# Patient Record
Sex: FEMALE | Race: OTHER | Employment: PART TIME | URBAN - METROPOLITAN AREA
[De-identification: names, ages, dates, MRNs, and addresses within clinical notes are randomized per-mention and may not be internally consistent; named-entity substitution may affect disease eponyms.]

---

## 2017-02-28 ENCOUNTER — HOSPITAL ENCOUNTER (OUTPATIENT)
Dept: ULTRASOUND IMAGING | Age: 47
Discharge: HOME | End: 2017-02-28
Attending: NURSE PRACTITIONER
Payer: COMMERCIAL

## 2017-02-28 DIAGNOSIS — R10.13 EPIGASTRIC PAIN: ICD-10-CM

## 2017-02-28 PROCEDURE — 76700 US EXAM ABDOM COMPLETE: CPT

## 2017-03-06 ENCOUNTER — HOSPITAL ENCOUNTER (OUTPATIENT)
Dept: MAMMOGRAPHY | Age: 47
Discharge: HOME | End: 2017-03-06
Attending: NURSE PRACTITIONER
Payer: COMMERCIAL

## 2017-03-06 DIAGNOSIS — Z12.31 VISIT FOR SCREENING MAMMOGRAM: ICD-10-CM

## 2017-03-06 PROCEDURE — 77067 SCR MAMMO BI INCL CAD: CPT

## 2017-03-20 ENCOUNTER — OFFICE VISIT (OUTPATIENT)
Dept: OBGYN CLINIC | Facility: CLINIC | Age: 47
End: 2017-03-20

## 2017-03-20 VITALS
SYSTOLIC BLOOD PRESSURE: 120 MMHG | BODY MASS INDEX: 25.52 KG/M2 | WEIGHT: 144 LBS | HEART RATE: 88 BPM | HEIGHT: 63 IN | RESPIRATION RATE: 16 BRPM | DIASTOLIC BLOOD PRESSURE: 70 MMHG

## 2017-03-20 DIAGNOSIS — Z12.4 CERVICAL CANCER SCREENING: ICD-10-CM

## 2017-03-20 DIAGNOSIS — Z01.419 ENCOUNTER FOR GYNECOLOGICAL EXAMINATION WITHOUT ABNORMAL FINDING: Primary | ICD-10-CM

## 2017-03-20 DIAGNOSIS — Z12.39 BREAST CANCER SCREENING: ICD-10-CM

## 2017-03-20 DIAGNOSIS — D25.9 UTERINE LEIOMYOMA, UNSPECIFIED LOCATION: ICD-10-CM

## 2017-03-20 PROCEDURE — 88175 CYTOPATH C/V AUTO FLUID REDO: CPT | Performed by: OBSTETRICS & GYNECOLOGY

## 2017-03-20 PROCEDURE — 87624 HPV HI-RISK TYP POOLED RSLT: CPT | Performed by: OBSTETRICS & GYNECOLOGY

## 2017-03-20 PROCEDURE — 99396 PREV VISIT EST AGE 40-64: CPT | Performed by: OBSTETRICS & GYNECOLOGY

## 2017-03-20 NOTE — PROGRESS NOTES
GYN H&P     3/20/2017  10:36 AM    CC: Patient presents with:  Physical      HPI: patient is a 48year old  here for her annual gyne exam.   She has no complaints. Menses are regular. Denies any pelvic pain or irregular vaginal discharge.    Cont Seat Belt Yes     Social History Narrative       ROS:     Review of Systems:   Constitutional: Negative for fever, chills and fatigue   HENT: Negative congestion, sore throat   Eyes: Negative for pain and visual disturbance.    Respiratory: Negative for exam.       1. Routine gyne exam  -discussed diet and exercise  -birth control:  vasectomy  2. Cervical cancer screening  -pap smear collected  -pap 11/2015 ASCUS HPV neg  -Pap Smear,3 Years due on 11/17/2018  3.  Breast cancer screening  -normal [place]

## 2017-03-21 LAB — HPV I/H RISK 1 DNA SPEC QL NAA+PROBE: NEGATIVE

## 2017-03-22 ENCOUNTER — HOSPITAL ENCOUNTER (OUTPATIENT)
Dept: ULTRASOUND IMAGING | Age: 47
Discharge: HOME | End: 2017-03-22
Attending: OBSTETRICS & GYNECOLOGY
Payer: COMMERCIAL

## 2017-03-22 DIAGNOSIS — D25.9 UTERINE LEIOMYOMA, UNSPECIFIED LOCATION: ICD-10-CM

## 2017-03-22 PROCEDURE — 76830 TRANSVAGINAL US NON-OB: CPT

## 2017-03-22 PROCEDURE — 76856 US EXAM PELVIC COMPLETE: CPT

## 2017-05-15 ENCOUNTER — OFFICE VISIT (OUTPATIENT)
Dept: OBGYN CLINIC | Facility: CLINIC | Age: 47
End: 2017-05-15

## 2017-05-15 VITALS — BODY MASS INDEX: 26 KG/M2 | SYSTOLIC BLOOD PRESSURE: 120 MMHG | WEIGHT: 146 LBS | DIASTOLIC BLOOD PRESSURE: 70 MMHG

## 2017-05-15 DIAGNOSIS — R87.610 ATYPICAL SQUAMOUS CELLS OF UNDETERMINED SIGNIFICANCE (ASCUS) ON PAPANICOLAOU SMEAR OF CERVIX: Primary | ICD-10-CM

## 2017-05-15 DIAGNOSIS — Z01.812 PRE-PROCEDURE LAB EXAM: ICD-10-CM

## 2017-05-15 PROCEDURE — 81025 URINE PREGNANCY TEST: CPT | Performed by: OBSTETRICS & GYNECOLOGY

## 2017-05-15 PROCEDURE — 88305 TISSUE EXAM BY PATHOLOGIST: CPT | Performed by: OBSTETRICS & GYNECOLOGY

## 2017-05-15 PROCEDURE — 57454 BX/CURETT OF CERVIX W/SCOPE: CPT | Performed by: OBSTETRICS & GYNECOLOGY

## 2017-05-15 PROCEDURE — 88342 IMHCHEM/IMCYTCHM 1ST ANTB: CPT | Performed by: OBSTETRICS & GYNECOLOGY

## 2017-05-15 NOTE — PROGRESS NOTES
GYN H&P     5/15/2017  11:06 AM    CC: Patient presents with:  Colposcopy      HPI: patient is a 48year old  here for Patient presents with:  Colposcopy      Pt is here for colposcopy. Pap smear on 3/20 showed ASCUS. HPV neg.  Pt w/ previous pap a Seat Belt Yes     Social History Narrative       ROS:     Review of Systems:   Constitutional: Negative for fever, chills and fatigue   Respiratory: Negative for cough, shortness of breath and wheezing.    Cardiovascular: Negative for chest pain, palpitatio of undetermined significance (ASCUS) on Papanicolaou smear of cervix  -pt tolerated procedure well  - Specimen to pathology , tissue; Future  - Specimen to pathology , tissue; Future    2.  Pre-procedure lab exam    - Urine Preg Test [[id number]]      Vesna Dean

## 2018-02-17 ENCOUNTER — OFFICE VISIT (OUTPATIENT)
Dept: FAMILY MEDICINE CLINIC | Facility: CLINIC | Age: 48
End: 2018-02-17

## 2018-02-17 VITALS
TEMPERATURE: 98 F | DIASTOLIC BLOOD PRESSURE: 70 MMHG | WEIGHT: 141 LBS | HEIGHT: 63 IN | HEART RATE: 96 BPM | RESPIRATION RATE: 18 BRPM | BODY MASS INDEX: 24.98 KG/M2 | OXYGEN SATURATION: 98 % | SYSTOLIC BLOOD PRESSURE: 130 MMHG

## 2018-02-17 DIAGNOSIS — N30.90 CYSTITIS: ICD-10-CM

## 2018-02-17 DIAGNOSIS — R35.0 URINE FREQUENCY: Primary | ICD-10-CM

## 2018-02-17 LAB
APPEARANCE: CLEAR
MULTISTIX LOT#: ABNORMAL NUMERIC
PH, URINE: 6.5 (ref 4.5–8)
SPECIFIC GRAVITY: 1.02 (ref 1–1.03)
URINE-COLOR: YELLOW
UROBILINOGEN,SEMI-QN: 0.2 MG/DL (ref 0–1.9)

## 2018-02-17 PROCEDURE — 87086 URINE CULTURE/COLONY COUNT: CPT | Performed by: FAMILY MEDICINE

## 2018-02-17 PROCEDURE — 81003 URINALYSIS AUTO W/O SCOPE: CPT | Performed by: FAMILY MEDICINE

## 2018-02-17 PROCEDURE — 87077 CULTURE AEROBIC IDENTIFY: CPT | Performed by: FAMILY MEDICINE

## 2018-02-17 PROCEDURE — 99203 OFFICE O/P NEW LOW 30 MIN: CPT | Performed by: FAMILY MEDICINE

## 2018-02-17 RX ORDER — CIPROFLOXACIN 500 MG/1
500 TABLET, FILM COATED ORAL 2 TIMES DAILY
Qty: 10 TABLET | Refills: 0 | Status: SHIPPED | OUTPATIENT
Start: 2018-02-17 | End: 2018-02-22

## 2018-02-18 NOTE — PROGRESS NOTES
Lio Todd is a 50year old female. HPI:   Patient's presenting with urinary complaints of burning on urination, urinary hesitancy, and mild suprapubic pressure. Patient reports no flank pain and no fever and no chills.   Patient reports no vaginal dis urine culture, +leuks and blood  - URINALYSIS, AUTO, W/O SCOPE  - URINE CULTURE, ROUTINE; Future  - Ciprofloxacin HCl (CIPRO) 500 MG Oral Tab; Take 1 tablet (500 mg total) by mouth 2 (two) times daily. Dispense: 10 tablet;  Refill: 0  - URINE CULTURE, ROUT

## 2018-03-09 ENCOUNTER — OFFICE VISIT (OUTPATIENT)
Dept: OBGYN CLINIC | Facility: CLINIC | Age: 48
End: 2018-03-09

## 2018-03-09 VITALS — DIASTOLIC BLOOD PRESSURE: 76 MMHG | BODY MASS INDEX: 25 KG/M2 | SYSTOLIC BLOOD PRESSURE: 116 MMHG | WEIGHT: 140 LBS

## 2018-03-09 DIAGNOSIS — D25.1 INTRAMURAL LEIOMYOMA OF UTERUS: ICD-10-CM

## 2018-03-09 DIAGNOSIS — R10.2 PELVIC PAIN IN FEMALE: Primary | ICD-10-CM

## 2018-03-09 DIAGNOSIS — Z12.39 BREAST CANCER SCREENING: ICD-10-CM

## 2018-03-09 PROCEDURE — 99213 OFFICE O/P EST LOW 20 MIN: CPT | Performed by: OBSTETRICS & GYNECOLOGY

## 2018-03-09 NOTE — PROGRESS NOTES
GYN H&P     3/9/2018  12:22 PM    CC: Patient presents with: Other: Patient here with c/o pelvic discomfort, was seen in urgent care with c/o urinary symptoms and was given treatment for UTI.  Also, states she has urinary incontinence      HPI: patient is name: N/A    Years of education: N/A  Number of children: N/A     Occupational History  None on file     Social History Main Topics   Smoking status: Never Smoker    Smokeless tobacco: Never Used    Alcohol use No    Drug use: No    Sexual activity: Yes

## 2018-04-10 ENCOUNTER — HOSPITAL ENCOUNTER (OUTPATIENT)
Dept: ULTRASOUND IMAGING | Age: 48
Discharge: HOME | End: 2018-04-10
Attending: OBSTETRICS & GYNECOLOGY
Payer: COMMERCIAL

## 2018-04-10 ENCOUNTER — HOSPITAL ENCOUNTER (OUTPATIENT)
Dept: MAMMOGRAPHY | Age: 48
Discharge: HOME | End: 2018-04-10
Attending: OBSTETRICS & GYNECOLOGY
Payer: COMMERCIAL

## 2018-04-10 DIAGNOSIS — D25.1 INTRAMURAL LEIOMYOMA OF UTERUS: ICD-10-CM

## 2018-04-10 DIAGNOSIS — Z12.39 BREAST CANCER SCREENING: ICD-10-CM

## 2018-04-10 DIAGNOSIS — R10.2 PELVIC PAIN IN FEMALE: ICD-10-CM

## 2018-04-10 PROCEDURE — 76856 US EXAM PELVIC COMPLETE: CPT | Performed by: OBSTETRICS & GYNECOLOGY

## 2018-04-10 PROCEDURE — 77067 SCR MAMMO BI INCL CAD: CPT | Performed by: OBSTETRICS & GYNECOLOGY

## 2018-04-10 PROCEDURE — 77063 BREAST TOMOSYNTHESIS BI: CPT | Performed by: OBSTETRICS & GYNECOLOGY

## 2018-04-10 PROCEDURE — 76830 TRANSVAGINAL US NON-OB: CPT | Performed by: OBSTETRICS & GYNECOLOGY

## 2018-04-11 NOTE — PROGRESS NOTES
Pelvic US reviewed, multiple fibroids noted, slightly increased in size from previous ultrasound and also has right side small ovarian cyst. Will discuss mgt further at upcoming appt.    Yaima Olmstead MD

## 2018-04-20 ENCOUNTER — HOSPITAL ENCOUNTER (OUTPATIENT)
Dept: MAMMOGRAPHY | Age: 48
Discharge: HOME | End: 2018-04-20
Attending: OBSTETRICS & GYNECOLOGY
Payer: COMMERCIAL

## 2018-04-20 DIAGNOSIS — R92.2 INCONCLUSIVE MAMMOGRAM: ICD-10-CM

## 2018-04-20 PROCEDURE — 77065 DX MAMMO INCL CAD UNI: CPT | Performed by: OBSTETRICS & GYNECOLOGY

## 2018-04-24 DIAGNOSIS — R92.1 BREAST CALCIFICATION SEEN ON MAMMOGRAM: Primary | ICD-10-CM

## 2018-04-24 NOTE — PROGRESS NOTES
Spoke with patient. Reported result and recommendation for repeat imaging in 6 months to make sure that everything stays stable. Patient states understanding and has no questions at this time. Order placed.

## 2018-06-21 ENCOUNTER — OFFICE VISIT (OUTPATIENT)
Dept: OBGYN CLINIC | Facility: CLINIC | Age: 48
End: 2018-06-21

## 2018-06-21 VITALS
RESPIRATION RATE: 16 BRPM | HEART RATE: 98 BPM | DIASTOLIC BLOOD PRESSURE: 70 MMHG | WEIGHT: 139.25 LBS | SYSTOLIC BLOOD PRESSURE: 112 MMHG | BODY MASS INDEX: 23.48 KG/M2 | HEIGHT: 64.5 IN

## 2018-06-21 DIAGNOSIS — Z12.39 BREAST CANCER SCREENING: ICD-10-CM

## 2018-06-21 DIAGNOSIS — Z01.419 WELL WOMAN EXAM: Primary | ICD-10-CM

## 2018-06-21 DIAGNOSIS — Z12.4 CERVICAL CANCER SCREENING: ICD-10-CM

## 2018-06-21 PROCEDURE — 99396 PREV VISIT EST AGE 40-64: CPT | Performed by: OBSTETRICS & GYNECOLOGY

## 2018-06-21 PROCEDURE — 87624 HPV HI-RISK TYP POOLED RSLT: CPT | Performed by: OBSTETRICS & GYNECOLOGY

## 2018-06-21 RX ORDER — NEOMYCIN SULFATE, POLYMYXIN B SULFATE AND DEXAMETHASONE 3.5; 10000; 1 MG/ML; [USP'U]/ML; MG/ML
SUSPENSION/ DROPS OPHTHALMIC
Status: ON HOLD | COMMUNITY
Start: 2018-04-27 | End: 2020-09-08 | Stop reason: ALTCHOICE

## 2018-06-21 NOTE — PROGRESS NOTES
GYN H&P     2018  1:23 PM    CC: Patient presents with:  Physical      HPI: patient is a 50year old  here for her annual gyne exam.      Menses are regular. No pelvic pain, c/o LBP has previously gone for physical therapy.  followed by PCP  N Exercise Yes    Comment: 2-3 x week, walking    Seat Belt Yes     Social History Narrative   None on file       ROS:     Review of Systems:   Constitutional: Negative for fever, chills and fatigue   HENT: Negative congestion, sore throat   Eyes: Negative f indicated  EXTREMITIES:  non tender, without edema      PLAN      1. Well woman exam  -Well woman exam   -discussed diet and exercise  -advised to f/u w/ PCP regarding low back pain    2.  Breast cancer screening  -Mammogram due on 04/20/2019  -10/2019 pt n

## 2018-08-03 ENCOUNTER — HOSPITAL ENCOUNTER (EMERGENCY)
Age: 48
Discharge: HOME | End: 2018-08-03
Attending: EMERGENCY MEDICINE
Payer: COMMERCIAL

## 2018-08-03 VITALS
OXYGEN SATURATION: 98 % | SYSTOLIC BLOOD PRESSURE: 155 MMHG | DIASTOLIC BLOOD PRESSURE: 90 MMHG | BODY MASS INDEX: 24 KG/M2 | TEMPERATURE: 98 F | RESPIRATION RATE: 18 BRPM | HEART RATE: 65 BPM | WEIGHT: 140 LBS

## 2018-08-03 DIAGNOSIS — S29.012A UPPER BACK STRAIN, INITIAL ENCOUNTER: Primary | ICD-10-CM

## 2018-08-03 PROCEDURE — 99283 EMERGENCY DEPT VISIT LOW MDM: CPT

## 2018-08-03 RX ORDER — CYCLOBENZAPRINE HCL 10 MG
10 TABLET ORAL 3 TIMES DAILY PRN
Qty: 20 TABLET | Refills: 0 | Status: SHIPPED | OUTPATIENT
Start: 2018-08-03 | End: 2018-08-10

## 2018-08-03 RX ORDER — NAPROXEN 500 MG/1
500 TABLET ORAL 2 TIMES DAILY PRN
Qty: 20 TABLET | Refills: 0 | Status: SHIPPED | OUTPATIENT
Start: 2018-08-03 | End: 2018-08-10
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
## 2018-10-01 ENCOUNTER — HOSPITAL ENCOUNTER (OUTPATIENT)
Dept: MAMMOGRAPHY | Age: 48
Discharge: HOME | End: 2018-10-01
Attending: OBSTETRICS & GYNECOLOGY
Payer: COMMERCIAL

## 2018-10-01 DIAGNOSIS — R92.1 BREAST CALCIFICATION SEEN ON MAMMOGRAM: ICD-10-CM

## 2018-10-01 PROCEDURE — 77065 DX MAMMO INCL CAD UNI: CPT | Performed by: OBSTETRICS & GYNECOLOGY

## 2018-10-01 PROCEDURE — 77061 BREAST TOMOSYNTHESIS UNI: CPT | Performed by: OBSTETRICS & GYNECOLOGY

## 2018-10-04 DIAGNOSIS — R92.1 BREAST CALCIFICATION, LEFT: Primary | ICD-10-CM

## 2018-12-17 ENCOUNTER — PRIOR ORIGINAL RECORDS (OUTPATIENT)
Dept: OTHER | Age: 48
End: 2018-12-17

## 2018-12-17 ENCOUNTER — APPOINTMENT (OUTPATIENT)
Dept: CT IMAGING | Age: 48
End: 2018-12-17
Attending: EMERGENCY MEDICINE
Payer: COMMERCIAL

## 2018-12-17 ENCOUNTER — HOSPITAL ENCOUNTER (EMERGENCY)
Age: 48
Discharge: HOME | End: 2018-12-17
Attending: EMERGENCY MEDICINE
Payer: COMMERCIAL

## 2018-12-17 ENCOUNTER — APPOINTMENT (OUTPATIENT)
Dept: GENERAL RADIOLOGY | Age: 48
End: 2018-12-17
Attending: EMERGENCY MEDICINE
Payer: COMMERCIAL

## 2018-12-17 VITALS
WEIGHT: 131 LBS | SYSTOLIC BLOOD PRESSURE: 122 MMHG | DIASTOLIC BLOOD PRESSURE: 71 MMHG | TEMPERATURE: 99 F | HEIGHT: 66 IN | BODY MASS INDEX: 21.05 KG/M2 | OXYGEN SATURATION: 100 % | HEART RATE: 69 BPM | RESPIRATION RATE: 20 BRPM

## 2018-12-17 DIAGNOSIS — R07.9 CHEST PAIN OF UNCERTAIN ETIOLOGY: Primary | ICD-10-CM

## 2018-12-17 PROCEDURE — 71045 X-RAY EXAM CHEST 1 VIEW: CPT | Performed by: EMERGENCY MEDICINE

## 2018-12-17 PROCEDURE — 71275 CT ANGIOGRAPHY CHEST: CPT | Performed by: EMERGENCY MEDICINE

## 2018-12-17 PROCEDURE — 93005 ELECTROCARDIOGRAM TRACING: CPT

## 2018-12-17 PROCEDURE — 85025 COMPLETE CBC W/AUTO DIFF WBC: CPT | Performed by: EMERGENCY MEDICINE

## 2018-12-17 PROCEDURE — 83880 ASSAY OF NATRIURETIC PEPTIDE: CPT | Performed by: EMERGENCY MEDICINE

## 2018-12-17 PROCEDURE — 99285 EMERGENCY DEPT VISIT HI MDM: CPT

## 2018-12-17 PROCEDURE — 80053 COMPREHEN METABOLIC PANEL: CPT | Performed by: EMERGENCY MEDICINE

## 2018-12-17 PROCEDURE — 93010 ELECTROCARDIOGRAM REPORT: CPT

## 2018-12-17 PROCEDURE — 84484 ASSAY OF TROPONIN QUANT: CPT | Performed by: EMERGENCY MEDICINE

## 2018-12-17 PROCEDURE — 36415 COLL VENOUS BLD VENIPUNCTURE: CPT

## 2018-12-17 NOTE — ED PROVIDER NOTES
Patient Seen in: THE [facility] Emergency Department In [place]    History   Patient presents with:  Chest Pain Angina (cardiovascular)    Stated Complaint: sent by pmd, states having left chest pain onset yesterday with shortness of br*    HPI    46year-old Temp 98.7 °F (37.1 °C) (Temporal)   Resp 20   Ht 167.6 cm (5' 6\")   Wt 59.4 kg   LMP 12/10/2018   SpO2 100%   BMI 21.14 kg/m²         Physical Exam    General: Patient is awake, alert in no acute distress. HEENT:  Pupils are PERRL.  Extraocular muscles a Report. Rate: 65  Rhythm: Sinus Rhythm  Reading: No significant change compared with EKG #1    Chest x-ray: No acute findings  CTA chest: No PE.   Insufficient opacification of the thoracic aorta to exclude dissection         MDM   46year-old as above wit

## 2018-12-20 ENCOUNTER — APPOINTMENT (OUTPATIENT)
Dept: CV DIAGNOSTICS | Facility: HOSPITAL | Age: 48
End: 2018-12-20
Attending: INTERNAL MEDICINE
Payer: COMMERCIAL

## 2018-12-20 ENCOUNTER — HOSPITAL ENCOUNTER (OUTPATIENT)
Facility: HOSPITAL | Age: 48
Setting detail: OBSERVATION
Discharge: HOME | End: 2018-12-20
Attending: EMERGENCY MEDICINE | Admitting: HOSPITALIST
Payer: COMMERCIAL

## 2018-12-20 ENCOUNTER — APPOINTMENT (OUTPATIENT)
Dept: CV DIAGNOSTICS | Facility: HOSPITAL | Age: 48
End: 2018-12-20
Attending: HOSPITALIST
Payer: COMMERCIAL

## 2018-12-20 ENCOUNTER — APPOINTMENT (OUTPATIENT)
Dept: GENERAL RADIOLOGY | Age: 48
End: 2018-12-20
Attending: EMERGENCY MEDICINE
Payer: COMMERCIAL

## 2018-12-20 ENCOUNTER — PRIOR ORIGINAL RECORDS (OUTPATIENT)
Dept: OTHER | Age: 48
End: 2018-12-20

## 2018-12-20 VITALS
OXYGEN SATURATION: 95 % | HEART RATE: 91 BPM | SYSTOLIC BLOOD PRESSURE: 124 MMHG | WEIGHT: 130.94 LBS | TEMPERATURE: 99 F | DIASTOLIC BLOOD PRESSURE: 82 MMHG | RESPIRATION RATE: 16 BRPM | BODY MASS INDEX: 21 KG/M2

## 2018-12-20 DIAGNOSIS — R07.9 ACUTE CHEST PAIN: Primary | ICD-10-CM

## 2018-12-20 PROCEDURE — 93306 TTE W/DOPPLER COMPLETE: CPT | Performed by: HOSPITALIST

## 2018-12-20 PROCEDURE — 93018 CV STRESS TEST I&R ONLY: CPT | Performed by: INTERNAL MEDICINE

## 2018-12-20 PROCEDURE — 71045 X-RAY EXAM CHEST 1 VIEW: CPT | Performed by: EMERGENCY MEDICINE

## 2018-12-20 PROCEDURE — 93017 CV STRESS TEST TRACING ONLY: CPT | Performed by: INTERNAL MEDICINE

## 2018-12-20 PROCEDURE — 78452 HT MUSCLE IMAGE SPECT MULT: CPT | Performed by: INTERNAL MEDICINE

## 2018-12-20 PROCEDURE — 99219 INITIAL OBSERVATION CARE,LEVL II: CPT | Performed by: HOSPITALIST

## 2018-12-20 RX ORDER — ASPIRIN 81 MG/1
324 TABLET, CHEWABLE ORAL ONCE
Status: COMPLETED | OUTPATIENT
Start: 2018-12-20 | End: 2018-12-20

## 2018-12-20 RX ORDER — SODIUM PHOSPHATE, DIBASIC AND SODIUM PHOSPHATE, MONOBASIC 7; 19 G/133ML; G/133ML
1 ENEMA RECTAL ONCE AS NEEDED
Status: DISCONTINUED | OUTPATIENT
Start: 2018-12-20 | End: 2018-12-20

## 2018-12-20 RX ORDER — ONDANSETRON 2 MG/ML
4 INJECTION INTRAMUSCULAR; INTRAVENOUS EVERY 6 HOURS PRN
Status: DISCONTINUED | OUTPATIENT
Start: 2018-12-20 | End: 2018-12-20

## 2018-12-20 RX ORDER — MORPHINE SULFATE 4 MG/ML
2 INJECTION, SOLUTION INTRAMUSCULAR; INTRAVENOUS EVERY 2 HOUR PRN
Status: DISCONTINUED | OUTPATIENT
Start: 2018-12-20 | End: 2018-12-20

## 2018-12-20 RX ORDER — POLYETHYLENE GLYCOL 3350 17 G/17G
17 POWDER, FOR SOLUTION ORAL DAILY PRN
Status: DISCONTINUED | OUTPATIENT
Start: 2018-12-20 | End: 2018-12-20

## 2018-12-20 RX ORDER — POTASSIUM CHLORIDE 20 MEQ/1
40 TABLET, EXTENDED RELEASE ORAL ONCE
Status: COMPLETED | OUTPATIENT
Start: 2018-12-20 | End: 2018-12-20

## 2018-12-20 RX ORDER — ACETAMINOPHEN 325 MG/1
650 TABLET ORAL EVERY 6 HOURS PRN
Status: DISCONTINUED | OUTPATIENT
Start: 2018-12-20 | End: 2018-12-20

## 2018-12-20 RX ORDER — METOCLOPRAMIDE HYDROCHLORIDE 5 MG/ML
10 INJECTION INTRAMUSCULAR; INTRAVENOUS EVERY 8 HOURS PRN
Status: DISCONTINUED | OUTPATIENT
Start: 2018-12-20 | End: 2018-12-20

## 2018-12-20 RX ORDER — MORPHINE SULFATE 4 MG/ML
4 INJECTION, SOLUTION INTRAMUSCULAR; INTRAVENOUS EVERY 2 HOUR PRN
Status: DISCONTINUED | OUTPATIENT
Start: 2018-12-20 | End: 2018-12-20

## 2018-12-20 RX ORDER — DOCUSATE SODIUM 100 MG/1
100 CAPSULE, LIQUID FILLED ORAL 2 TIMES DAILY
Status: DISCONTINUED | OUTPATIENT
Start: 2018-12-20 | End: 2018-12-20

## 2018-12-20 RX ORDER — NITROGLYCERIN 0.4 MG/1
0.4 TABLET SUBLINGUAL ONCE
Status: COMPLETED | OUTPATIENT
Start: 2018-12-20 | End: 2018-12-20

## 2018-12-20 RX ORDER — MORPHINE SULFATE 4 MG/ML
1 INJECTION, SOLUTION INTRAMUSCULAR; INTRAVENOUS EVERY 2 HOUR PRN
Status: DISCONTINUED | OUTPATIENT
Start: 2018-12-20 | End: 2018-12-20

## 2018-12-20 RX ORDER — BISACODYL 10 MG
10 SUPPOSITORY, RECTAL RECTAL
Status: DISCONTINUED | OUTPATIENT
Start: 2018-12-20 | End: 2018-12-20

## 2018-12-20 NOTE — PAYOR COMM NOTE
--------------  ADMISSION REVIEW     Payor: Mode Lopez  #:  [id number]  Authorization Number: N/A    Admit date: N/A  Admit time: N/A       Admitting Physician: Chuy Moore MD  Attending Physician:  Lexy Duffy DO  Primary Care y Final result                 Please view results for these tests on the individual orders.    RAINBOW DRAW BLUE   RAINBOW DRAW LAVENDER   RAINBOW DRAW LIGHT GREEN   RAINBOW DRAW GOLD   CBC W/ DIFFERENTIAL     EKG    Rate, intervals and axes as noted on

## 2018-12-20 NOTE — CONSULTS
[facility]  Cardiology Consultation    [place] Patient Status:  Observation    10/7/1970 MRN [id number]   [facility] 8NE-A Attending Juanjo De Paz, [address] Day # 0 PCP Romana Davis MD     Reason for Consultation:  Chest pain mL, Oral, Daily PRN  •  bisacodyl (DULCOLAX) rectal suppository 10 mg, 10 mg, Rectal, Daily PRN  •  FLEET ENEMA (FLEET) 7-19 GM/118ML enema 133 mL, 1 enema, Rectal, Once PRN  •  ondansetron HCl (ZOFRAN) injection 4 mg, 4 mg, Intravenous, Q6H PRN  •  Metocl TP 7.6 12/20/2018    AST 15 12/20/2018    ALT 21 12/20/2018    TROP <0.046 12/20/2018       Imaging:  CXR - unremarkable    Impression:  Principal Problem:    Acute chest pain  Anxiety    Recommendations:  - likely non-cardiac chest pain, howerver, this is

## 2018-12-20 NOTE — PROGRESS NOTES
12/20/18 0231 12/20/18 0235 12/20/18 0239   Vital Signs   Temp 98.3 °F (36.8 °C) --  --    Temp src Oral --  --    Pulse 68 68 85   Heart Rate Source Monitor Monitor Monitor   Resp 16 18 18   Respiratory Quality Normal Normal Normal   /81 149/87 1

## 2018-12-20 NOTE — ED PROVIDER NOTES
Patient Seen in: [facility] In [place]    History   Patient presents with:  Chest Pain Angina (cardiovascular)    Stated Complaint: CP, ISAURO, fatigue, legs feel loose. Here Monday for the same.  Has not seen PCP o*    HPI    57-year-old well-nourished. No distress. HENT:   Head: Normocephalic and atraumatic. Mouth/Throat: Oropharynx is clear and moist. No oropharyngeal exudate. Eyes: Conjunctivae and EOM are normal. Pupils are equal, round, and reactive to light. No scleral icterus. EKG Report. Rate:  74  Rhythm: Sinus Rhythm  Reading: Normal sinus rhythm. Poor R wave progression. No ST-T wave abnormalities. MDM      Admission disposition: 12/20/2018  1:22 AM           Patient pain-free throughout ED course.   Leelee

## 2018-12-20 NOTE — H&P
[facility]  History and Physical     Orville Lylelynn Patient Status:  Observation    10/7/1970 MRN [id number]   [facility] 8NE-A Attending Sabas Pimentel [address] Day # 0 PCP Evon James MD     Chief Complaint: Chest pa Heart Disorder Maternal Grandmother    • Heart Disorder Maternal Grandfather    • Heart Disorder Paternal Grandmother    • Other (Other) Paternal Grandfather        Allergies: No Known Allergies    Medications:    No current facility-administered medicatio Estimated Creatinine Clearance: 98.4 mL/min (based on SCr of 0.64 mg/dL). No results for input(s): PTP, INR in the last 168 hours.     Recent Labs   Lab  12/17/18   1558  12/17/18   1752  12/20/18   0038   TROP  <0.046  <0.046  <0.046       Imaging

## 2018-12-20 NOTE — PROGRESS NOTES
NURSING ADMISSION NOTE      Patient admitted via Ambulance  Oriented to room. Safety precautions initiated. Bed in low position. Call light in reach. Pt AOx4. RA. VSS. Denies pain but reporting intermittent chest pressure earlier.  Hospitalist P

## 2018-12-20 NOTE — PROGRESS NOTES
CARDIODIAGNOSTIC PRELIMINARY REPORT:      ARON protocol completed with no new EKG changes noted; no arrhythmias      Denied cardiac symptoms      Base: 124/84, HR: 90;  Peak: 166/78, HR: 157 (92% APMHR)  Second set of images pending

## 2018-12-20 NOTE — PROGRESS NOTES
Hospitalist Follow-Up Note    Patient seen and examined and agree with plan outlined by Dr. Cynthia Davenport this morning. Reports mild left sided chest pressure which felt better with nitro given in ED. CTA chest negative 12/17.  Pending echo and cardiology eval.

## 2018-12-20 NOTE — PLAN OF CARE
Nuclear stress test negative for ischemia, EF 69%. Ok to discharge from a cardiac standpoint.      Boston Lowery

## 2018-12-21 NOTE — PLAN OF CARE
NURSING DISCHARGE NOTE    Discharged Home via Ambulatory. Accompanied by Spouse  Belongings Taken by patient/family. Patient and spouse educated on d/c medications, instructions, and follow ups. They both verbalize understanding.   Patient declined

## 2019-01-08 ENCOUNTER — MYAURORA ACCOUNT LINK (OUTPATIENT)
Dept: OTHER | Age: 49
End: 2019-01-08

## 2019-01-08 ENCOUNTER — PRIOR ORIGINAL RECORDS (OUTPATIENT)
Dept: OTHER | Age: 49
End: 2019-01-08

## 2019-01-15 LAB
ALBUMIN: 4 G/DL
ALKALINE PHOSPHATATE(ALK PHOS): 89 IU/L
BILIRUBIN TOTAL: 0.2 MG/DL
BUN: 10 MG/DL
BUN: 11 MG/DL
CALCIUM: 8.8 MG/DL
CALCIUM: 8.9 MG/DL
CHLORIDE: 104 MEQ/L
CHLORIDE: 109 MEQ/L
CREATININE, SERUM: 0.64 MG/DL
CREATININE, SERUM: 0.72 MG/DL
GLOBULIN: 3.6 G/DL
GLUCOSE: 113 MG/DL
GLUCOSE: 120 MG/DL
HEMATOCRIT: 40.5 %
HEMOGLOBIN: 13.3 G/DL
PLATELETS: 315 K/UL
POTASSIUM, SERUM: 3.4 MEQ/L
POTASSIUM, SERUM: 3.8 MEQ/L
PROBNP: 43 PG/ML
PROTEIN, TOTAL: 7.6 G/DL
RED BLOOD COUNT: 4.49 X 10-6/U
SGOT (AST): 15 IU/L
SGPT (ALT): 21 IU/L
SODIUM: 138 MEQ/L
SODIUM: 139 MEQ/L
WHITE BLOOD COUNT: 7.3 X 10-3/U

## 2019-02-22 ENCOUNTER — PRIOR ORIGINAL RECORDS (OUTPATIENT)
Dept: OTHER | Age: 49
End: 2019-02-22

## 2019-02-28 VITALS
DIASTOLIC BLOOD PRESSURE: 74 MMHG | HEART RATE: 78 BPM | SYSTOLIC BLOOD PRESSURE: 130 MMHG | WEIGHT: 142 LBS | HEIGHT: 66 IN | BODY MASS INDEX: 22.82 KG/M2

## 2019-03-05 ENCOUNTER — LAB ENCOUNTER (OUTPATIENT)
Dept: LAB | Age: 49
End: 2019-03-05
Attending: INTERNAL MEDICINE
Payer: COMMERCIAL

## 2019-03-05 DIAGNOSIS — R00.2 PALPITATIONS: Primary | ICD-10-CM

## 2019-03-05 LAB
T4 FREE SERPL-MCNC: 1 NG/DL (ref 0.8–1.7)
TSI SER-ACNC: 0.92 MIU/ML (ref 0.36–3.74)

## 2019-03-05 PROCEDURE — 36415 COLL VENOUS BLD VENIPUNCTURE: CPT

## 2019-03-05 PROCEDURE — 84443 ASSAY THYROID STIM HORMONE: CPT

## 2019-03-05 PROCEDURE — 84439 ASSAY OF FREE THYROXINE: CPT

## 2019-03-08 ENCOUNTER — TELEPHONE (OUTPATIENT)
Dept: CARDIOLOGY | Age: 49
End: 2019-03-08

## 2019-03-11 ENCOUNTER — HOSPITAL ENCOUNTER (OUTPATIENT)
Dept: CV DIAGNOSTICS | Facility: HOSPITAL | Age: 49
Discharge: HOME | End: 2019-03-11
Attending: INTERNAL MEDICINE
Payer: COMMERCIAL

## 2019-03-11 DIAGNOSIS — R00.2 PALPITATIONS: ICD-10-CM

## 2019-03-11 PROCEDURE — 93270 REMOTE 30 DAY ECG REV/REPORT: CPT | Performed by: INTERNAL MEDICINE

## 2019-03-11 PROCEDURE — 93271 ECG/MONITORING AND ANALYSIS: CPT | Performed by: INTERNAL MEDICINE

## 2019-03-11 PROCEDURE — 93272 ECG/REVIEW INTERPRET ONLY: CPT | Performed by: INTERNAL MEDICINE

## 2019-04-11 ENCOUNTER — HOSPITAL ENCOUNTER (OUTPATIENT)
Dept: MAMMOGRAPHY | Age: 49
Discharge: HOME | End: 2019-04-11
Attending: NURSE PRACTITIONER
Payer: COMMERCIAL

## 2019-04-11 DIAGNOSIS — R92.1 BREAST CALCIFICATION, LEFT: ICD-10-CM

## 2019-04-11 PROCEDURE — 77062 BREAST TOMOSYNTHESIS BI: CPT | Performed by: NURSE PRACTITIONER

## 2019-04-11 PROCEDURE — 77066 DX MAMMO INCL CAD BI: CPT | Performed by: NURSE PRACTITIONER

## 2019-04-16 ENCOUNTER — OFFICE VISIT (OUTPATIENT)
Dept: CARDIOLOGY | Age: 49
End: 2019-04-16

## 2019-04-16 VITALS
SYSTOLIC BLOOD PRESSURE: 120 MMHG | DIASTOLIC BLOOD PRESSURE: 81 MMHG | WEIGHT: 142 LBS | HEIGHT: 63 IN | BODY MASS INDEX: 25.16 KG/M2 | HEART RATE: 90 BPM

## 2019-04-16 DIAGNOSIS — F41.1 GENERALIZED ANXIETY DISORDER: Primary | ICD-10-CM

## 2019-04-16 DIAGNOSIS — R53.81 MALAISE AND FATIGUE: ICD-10-CM

## 2019-04-16 DIAGNOSIS — R53.83 MALAISE AND FATIGUE: ICD-10-CM

## 2019-04-16 DIAGNOSIS — R07.2 PRECORDIAL PAIN: ICD-10-CM

## 2019-04-16 PROBLEM — K21.9 GASTROESOPHAGEAL REFLUX DISEASE WITHOUT ESOPHAGITIS: Status: ACTIVE | Noted: 2019-04-16

## 2019-04-16 PROCEDURE — 99214 OFFICE O/P EST MOD 30 MIN: CPT | Performed by: INTERNAL MEDICINE

## 2019-04-16 RX ORDER — OMEGA-3 FATTY ACIDS/FISH OIL 300-1000MG
200 CAPSULE ORAL EVERY 6 HOURS PRN
COMMUNITY

## 2019-04-16 RX ORDER — ESOMEPRAZOLE MAGNESIUM 40 MG/1
40 CAPSULE, DELAYED RELEASE ORAL
COMMUNITY
End: 2019-04-16 | Stop reason: SDUPTHER

## 2019-04-16 RX ORDER — ESOMEPRAZOLE MAGNESIUM 40 MG/1
40 CAPSULE, DELAYED RELEASE ORAL
Qty: 30 CAPSULE | Refills: 1 | Status: SHIPPED | OUTPATIENT
Start: 2019-04-16 | End: 2019-11-01

## 2019-04-16 SDOH — HEALTH STABILITY: PHYSICAL HEALTH: ON AVERAGE, HOW MANY MINUTES DO YOU ENGAGE IN EXERCISE AT THIS LEVEL?: 30 MIN

## 2019-04-16 SDOH — HEALTH STABILITY: PHYSICAL HEALTH: ON AVERAGE, HOW MANY DAYS PER WEEK DO YOU ENGAGE IN MODERATE TO STRENUOUS EXERCISE (LIKE A BRISK WALK)?: 3 DAYS

## 2019-04-22 ENCOUNTER — LAB ENCOUNTER (OUTPATIENT)
Dept: LAB | Age: 49
End: 2019-04-22
Attending: INTERNAL MEDICINE
Payer: COMMERCIAL

## 2019-04-22 DIAGNOSIS — E75.00 GM2 GANGLIOSIDOSIS (HCC): ICD-10-CM

## 2019-04-22 DIAGNOSIS — R07.2 PRECORDIAL PAIN: ICD-10-CM

## 2019-04-22 DIAGNOSIS — R53.83 FATIGUE: Primary | ICD-10-CM

## 2019-04-22 PROCEDURE — 80061 LIPID PANEL: CPT

## 2019-04-22 PROCEDURE — 80053 COMPREHEN METABOLIC PANEL: CPT

## 2019-04-22 PROCEDURE — 36415 COLL VENOUS BLD VENIPUNCTURE: CPT

## 2019-04-23 ENCOUNTER — TELEPHONE (OUTPATIENT)
Dept: CARDIOLOGY | Age: 49
End: 2019-04-23

## 2019-04-23 DIAGNOSIS — I10 HYPERTENSION, UNSPECIFIED TYPE: Primary | ICD-10-CM

## 2019-04-24 RX ORDER — LISINOPRIL 10 MG/1
10 TABLET ORAL DAILY
Qty: 30 TABLET | Refills: 5 | Status: SHIPPED | OUTPATIENT
Start: 2019-04-24 | End: 2019-10-23 | Stop reason: SDUPTHER

## 2019-05-13 ENCOUNTER — TELEPHONE (OUTPATIENT)
Dept: CARDIOLOGY | Age: 49
End: 2019-05-13

## 2019-05-30 ENCOUNTER — LAB ENCOUNTER (OUTPATIENT)
Dept: LAB | Age: 49
End: 2019-05-30
Attending: INTERNAL MEDICINE
Payer: COMMERCIAL

## 2019-05-30 DIAGNOSIS — R53.81 MALAISE AND FATIGUE: ICD-10-CM

## 2019-05-30 DIAGNOSIS — R53.83 MALAISE AND FATIGUE: ICD-10-CM

## 2019-05-30 DIAGNOSIS — R00.2 PALPITATIONS: Primary | ICD-10-CM

## 2019-05-30 DIAGNOSIS — R07.2 PRECORDIAL PAIN: ICD-10-CM

## 2019-05-30 LAB
ALBUMIN SERPL-MCNC: 3.6 G/DL
ALBUMIN/GLOB SERPL: NORMAL {RATIO}
ALP SERPL-CCNC: 66 U/L
ALT SERPL-CCNC: 20 U/L
ANION GAP SERPL CALC-SCNC: NORMAL MMOL/L
AST SERPL-CCNC: 17 U/L
BILIRUB SERPL-MCNC: 0.5 MG/DL
BUN SERPL-MCNC: 19 MG/DL
BUN/CREAT SERPL: NORMAL
CALCIUM SERPL-MCNC: 9.1 MG/DL
CHLORIDE SERPL-SCNC: 108 MMOL/L
CHOLEST SERPL-MCNC: 234 MG/DL
CHOLEST/HDLC SERPL: NORMAL {RATIO}
CO2 SERPL-SCNC: NORMAL MMOL/L
CREAT SERPL-MCNC: 0.75 MG/DL
GLOBULIN SER-MCNC: 3.5 G/DL
GLUCOSE SERPL-MCNC: 90 MG/DL
HDLC SERPL-MCNC: 77 MG/DL
LDLC SERPL CALC-MCNC: 145 MG/DL
LENGTH OF FAST TIME PATIENT: NORMAL H
LENGTH OF FAST TIME PATIENT: NORMAL H
NONHDLC SERPL-MCNC: NORMAL MG/DL
POTASSIUM SERPL-SCNC: 3.9 MMOL/L
PROT SERPL-MCNC: 7.1 G/DL
SODIUM SERPL-SCNC: 140 MMOL/L
TRIGL SERPL-MCNC: 62 MG/DL
VLDLC SERPL CALC-MCNC: NORMAL MG/DL

## 2019-05-30 PROCEDURE — 36415 COLL VENOUS BLD VENIPUNCTURE: CPT

## 2019-05-30 PROCEDURE — 80053 COMPREHEN METABOLIC PANEL: CPT

## 2019-05-30 PROCEDURE — 80061 LIPID PANEL: CPT

## 2019-06-10 ENCOUNTER — TELEPHONE (OUTPATIENT)
Dept: CARDIOLOGY | Age: 49
End: 2019-06-10

## 2019-06-10 DIAGNOSIS — E78.00 HYPERCHOLESTEREMIA: Primary | ICD-10-CM

## 2019-06-10 RX ORDER — ROSUVASTATIN CALCIUM 10 MG/1
10 TABLET, COATED ORAL DAILY
Qty: 90 TABLET | Refills: 3 | Status: SHIPPED | OUTPATIENT
Start: 2019-06-10 | End: 2019-11-01

## 2019-06-12 ENCOUNTER — CLINICAL ABSTRACT (OUTPATIENT)
Dept: CARDIOLOGY | Age: 49
End: 2019-06-12

## 2019-06-18 ENCOUNTER — HOSPITAL ENCOUNTER (EMERGENCY)
Age: 49
Discharge: HOME | End: 2019-06-18
Attending: EMERGENCY MEDICINE
Payer: COMMERCIAL

## 2019-06-18 ENCOUNTER — OFFICE VISIT (OUTPATIENT)
Dept: FAMILY MEDICINE CLINIC | Facility: CLINIC | Age: 49
End: 2019-06-18
Payer: COMMERCIAL

## 2019-06-18 ENCOUNTER — APPOINTMENT (OUTPATIENT)
Dept: ULTRASOUND IMAGING | Age: 49
End: 2019-06-18
Attending: PHYSICIAN ASSISTANT
Payer: COMMERCIAL

## 2019-06-18 VITALS
HEART RATE: 78 BPM | RESPIRATION RATE: 18 BRPM | HEIGHT: 66 IN | SYSTOLIC BLOOD PRESSURE: 101 MMHG | DIASTOLIC BLOOD PRESSURE: 63 MMHG | TEMPERATURE: 98 F | WEIGHT: 140 LBS | OXYGEN SATURATION: 97 % | BODY MASS INDEX: 22.5 KG/M2

## 2019-06-18 VITALS
RESPIRATION RATE: 16 BRPM | HEIGHT: 64 IN | SYSTOLIC BLOOD PRESSURE: 110 MMHG | WEIGHT: 139.81 LBS | OXYGEN SATURATION: 98 % | TEMPERATURE: 98 F | DIASTOLIC BLOOD PRESSURE: 70 MMHG | HEART RATE: 82 BPM | BODY MASS INDEX: 23.87 KG/M2

## 2019-06-18 DIAGNOSIS — R11.2 NAUSEA AND VOMITING IN ADULT: ICD-10-CM

## 2019-06-18 DIAGNOSIS — R10.13 ABDOMINAL PAIN, EPIGASTRIC: Primary | ICD-10-CM

## 2019-06-18 DIAGNOSIS — Z02.9 ENCOUNTER FOR ADMINISTRATIVE EXAMINATIONS: Primary | ICD-10-CM

## 2019-06-18 LAB
ABSOLUTE IMMATURE GRANULOCYTES (OFFPRE24): NORMAL
ALBUMIN SERPL-MCNC: 3.4 G/DL
ALBUMIN/GLOB SERPL: NORMAL {RATIO}
ALP SERPL-CCNC: 72 U/L
ALT SERPL-CCNC: 20 U/L
ANION GAP SERPL CALC-SCNC: NORMAL MMOL/L
AST SERPL-CCNC: 16 U/L
BASO+EOS+MONOS # BLD: NORMAL 10*3/UL
BASO+EOS+MONOS NFR BLD: NORMAL %
BASOPHILS # BLD: NORMAL 10*3/UL
BASOPHILS NFR BLD: NORMAL %
BILIRUB SERPL-MCNC: 0.4 MG/DL
BUN SERPL-MCNC: 10 MG/DL
BUN/CREAT SERPL: NORMAL
CALCIUM SERPL-MCNC: 8.6 MG/DL
CHLORIDE SERPL-SCNC: 105 MMOL/L
CO2 SERPL-SCNC: NORMAL MMOL/L
CREAT SERPL-MCNC: 0.74 MG/DL
DIFFERENTIAL METHOD BLD: NORMAL
EOSINOPHIL # BLD: NORMAL 10*3/UL
EOSINOPHIL NFR BLD: NORMAL %
ERYTHROCYTE [DISTWIDTH] IN BLOOD: NORMAL %
GLOBULIN SER-MCNC: 3.8 G/DL
GLUCOSE SERPL-MCNC: 93 MG/DL
HCT VFR BLD CALC: 38.7 %
HGB BLD-MCNC: 12.5 G/DL
IMMATURE GRANULOCYTES (OFFPRE25): NORMAL
LENGTH OF FAST TIME PATIENT: NORMAL H
LYMPHOCYTES # BLD: NORMAL 10*3/UL
LYMPHOCYTES NFR BLD: NORMAL %
MCH RBC QN AUTO: NORMAL PG
MCHC RBC AUTO-ENTMCNC: NORMAL G/DL
MCV RBC AUTO: NORMAL FL
MONOCYTES # BLD: NORMAL 10*3/UL
MONOCYTES NFR BLD: NORMAL %
MPV (OFFPRE2): NORMAL
NEUTROPHILS # BLD: NORMAL 10*3/UL
NEUTROPHILS NFR BLD: NORMAL %
NRBC BLD MANUAL-RTO: NORMAL %
PLAT MORPH BLD: NORMAL
PLATELET # BLD: 258 10*3/UL
POTASSIUM SERPL-SCNC: 3.9 MMOL/L
PROT SERPL-MCNC: 7.2 G/DL
RBC # BLD: 4.22 10*6/UL
RBC MORPH BLD: NORMAL
SODIUM SERPL-SCNC: 136 MMOL/L
WBC # BLD: 7.7 10*3/UL
WBC MORPH BLD: NORMAL

## 2019-06-18 PROCEDURE — 80053 COMPREHEN METABOLIC PANEL: CPT

## 2019-06-18 PROCEDURE — 96361 HYDRATE IV INFUSION ADD-ON: CPT

## 2019-06-18 PROCEDURE — 80053 COMPREHEN METABOLIC PANEL: CPT | Performed by: EMERGENCY MEDICINE

## 2019-06-18 PROCEDURE — 83690 ASSAY OF LIPASE: CPT

## 2019-06-18 PROCEDURE — 83690 ASSAY OF LIPASE: CPT | Performed by: EMERGENCY MEDICINE

## 2019-06-18 PROCEDURE — 96374 THER/PROPH/DIAG INJ IV PUSH: CPT

## 2019-06-18 PROCEDURE — 99284 EMERGENCY DEPT VISIT MOD MDM: CPT

## 2019-06-18 PROCEDURE — 81003 URINALYSIS AUTO W/O SCOPE: CPT | Performed by: EMERGENCY MEDICINE

## 2019-06-18 PROCEDURE — 85025 COMPLETE CBC W/AUTO DIFF WBC: CPT

## 2019-06-18 PROCEDURE — 96375 TX/PRO/DX INJ NEW DRUG ADDON: CPT

## 2019-06-18 PROCEDURE — 76700 US EXAM ABDOM COMPLETE: CPT | Performed by: PHYSICIAN ASSISTANT

## 2019-06-18 PROCEDURE — 85025 COMPLETE CBC W/AUTO DIFF WBC: CPT | Performed by: EMERGENCY MEDICINE

## 2019-06-18 RX ORDER — ONDANSETRON 4 MG/1
4 TABLET, ORALLY DISINTEGRATING ORAL EVERY 4 HOURS PRN
Qty: 10 TABLET | Refills: 0 | Status: SHIPPED | OUTPATIENT
Start: 2019-06-18 | End: 2019-06-25

## 2019-06-18 RX ORDER — KETOROLAC TROMETHAMINE 30 MG/ML
15 INJECTION, SOLUTION INTRAMUSCULAR; INTRAVENOUS ONCE
Status: COMPLETED | OUTPATIENT
Start: 2019-06-18 | End: 2019-06-18

## 2019-06-18 RX ORDER — ESOMEPRAZOLE MAGNESIUM 40 MG/1
CAPSULE, DELAYED RELEASE ORAL
Refills: 1 | COMMUNITY
Start: 2019-05-16 | End: 2020-09-08

## 2019-06-18 RX ORDER — ONDANSETRON 2 MG/ML
4 INJECTION INTRAMUSCULAR; INTRAVENOUS ONCE
Status: COMPLETED | OUTPATIENT
Start: 2019-06-18 | End: 2019-06-18

## 2019-06-18 RX ORDER — NICOTINE POLACRILEX 4 MG/1
20 GUM, CHEWING ORAL DAILY
Qty: 14 TABLET | Refills: 0 | Status: SHIPPED | OUTPATIENT
Start: 2019-06-18 | End: 2019-07-02

## 2019-06-18 RX ORDER — LISINOPRIL 10 MG/1
5 TABLET ORAL DAILY
COMMUNITY
Start: 2019-04-24

## 2019-06-18 NOTE — ED PROVIDER NOTES
Patient Seen in: [facility] In [place]    History   Patient presents with:  Nausea/Vomiting/Diarrhea (gastrointestinal)  Headache (neurologic)    Stated Complaint: was at dr. office and sent here nausea and abdomen pain headaches for Constitutional: She is oriented to person, place, and time. She appears well-developed and well-nourished. HENT:   Head: Atraumatic.    Right Ear: External ear normal.   Left Ear: External ear normal.   Eyes: Conjunctivae are normal.   Neck: Normal rang informed. Feels better with zofran. Labs unremarkable. Plan to discharge with zofran and omeprazole. MDM   50yo female presents with n/v and epigastric pain. Mild tenderness over epigastrium. VSS. No fever. Moist mucus membrane.  No focal neuro defic

## 2019-06-18 NOTE — ED INITIAL ASSESSMENT (HPI)
Patient reports returning from a resort in [facility] 1 week ago   Patient reports having abd pain and \"not feeling well\" every other day since returning.  Patient reports emesis x2 for the whole week and epigastric pain

## 2019-10-23 RX ORDER — LISINOPRIL 10 MG/1
10 TABLET ORAL DAILY
Qty: 30 TABLET | Refills: 1 | Status: SHIPPED | OUTPATIENT
Start: 2019-10-23 | End: 2019-11-19 | Stop reason: DRUGHIGH

## 2019-11-19 ENCOUNTER — OFFICE VISIT (OUTPATIENT)
Dept: CARDIOLOGY | Age: 49
End: 2019-11-19

## 2019-11-19 VITALS
HEIGHT: 66 IN | DIASTOLIC BLOOD PRESSURE: 82 MMHG | BODY MASS INDEX: 22.82 KG/M2 | HEART RATE: 89 BPM | WEIGHT: 142 LBS | SYSTOLIC BLOOD PRESSURE: 117 MMHG

## 2019-11-19 DIAGNOSIS — I47.19 ATRIAL TACHYCARDIA: ICD-10-CM

## 2019-11-19 DIAGNOSIS — K21.9 GASTROESOPHAGEAL REFLUX DISEASE WITHOUT ESOPHAGITIS: ICD-10-CM

## 2019-11-19 DIAGNOSIS — R53.81 MALAISE AND FATIGUE: ICD-10-CM

## 2019-11-19 DIAGNOSIS — R07.2 PRECORDIAL PAIN: ICD-10-CM

## 2019-11-19 DIAGNOSIS — E78.00 HYPERCHOLESTEREMIA: ICD-10-CM

## 2019-11-19 DIAGNOSIS — R53.83 MALAISE AND FATIGUE: ICD-10-CM

## 2019-11-19 DIAGNOSIS — F41.1 GENERALIZED ANXIETY DISORDER: Primary | ICD-10-CM

## 2019-11-19 PROCEDURE — 99214 OFFICE O/P EST MOD 30 MIN: CPT | Performed by: INTERNAL MEDICINE

## 2019-11-19 RX ORDER — LISINOPRIL 5 MG/1
5 TABLET ORAL DAILY
Qty: 30 TABLET | Refills: 3 | Status: SHIPPED | OUTPATIENT
Start: 2019-11-19 | End: 2020-03-30 | Stop reason: SDUPTHER

## 2019-11-19 RX ORDER — LISINOPRIL 10 MG/1
10 TABLET ORAL DAILY
Qty: 90 TABLET | Refills: 1 | Status: CANCELLED | OUTPATIENT
Start: 2019-11-19

## 2019-11-19 RX ORDER — SIMVASTATIN 10 MG
10 TABLET ORAL NIGHTLY
Qty: 30 TABLET | Refills: 3 | Status: SHIPPED | OUTPATIENT
Start: 2019-11-19 | End: 2020-03-30 | Stop reason: SDUPTHER

## 2019-11-19 RX ORDER — METOPROLOL SUCCINATE 25 MG/1
TABLET, EXTENDED RELEASE ORAL
Qty: 15 TABLET | Refills: 3 | Status: SHIPPED | OUTPATIENT
Start: 2019-11-19 | End: 2020-03-30 | Stop reason: SDUPTHER

## 2019-11-19 SDOH — HEALTH STABILITY: PHYSICAL HEALTH: ON AVERAGE, HOW MANY DAYS PER WEEK DO YOU ENGAGE IN MODERATE TO STRENUOUS EXERCISE (LIKE A BRISK WALK)?: 7 DAYS

## 2019-11-19 SDOH — HEALTH STABILITY: MENTAL HEALTH: HOW OFTEN DO YOU HAVE A DRINK CONTAINING ALCOHOL?: 2-4 TIMES A MONTH

## 2019-11-19 SDOH — HEALTH STABILITY: PHYSICAL HEALTH: ON AVERAGE, HOW MANY MINUTES DO YOU ENGAGE IN EXERCISE AT THIS LEVEL?: 30 MIN

## 2019-11-19 ASSESSMENT — PATIENT HEALTH QUESTIONNAIRE - PHQ9
SUM OF ALL RESPONSES TO PHQ9 QUESTIONS 1 AND 2: 0
2. FEELING DOWN, DEPRESSED OR HOPELESS: NOT AT ALL
1. LITTLE INTEREST OR PLEASURE IN DOING THINGS: NOT AT ALL
SUM OF ALL RESPONSES TO PHQ9 QUESTIONS 1 AND 2: 0

## 2019-12-03 ENCOUNTER — CLINICAL ABSTRACT (OUTPATIENT)
Dept: CARDIOLOGY | Age: 49
End: 2019-12-03

## 2020-01-22 ENCOUNTER — APPOINTMENT (OUTPATIENT)
Dept: CARDIOLOGY | Age: 50
End: 2020-01-22

## 2020-03-30 RX ORDER — SIMVASTATIN 10 MG
10 TABLET ORAL NIGHTLY
Qty: 30 TABLET | Refills: 11 | Status: SHIPPED | OUTPATIENT
Start: 2020-03-30 | End: 2021-04-23

## 2020-03-30 RX ORDER — LISINOPRIL 5 MG/1
5 TABLET ORAL DAILY
Qty: 30 TABLET | Refills: 11 | Status: SHIPPED | OUTPATIENT
Start: 2020-03-30 | End: 2020-09-10 | Stop reason: SDUPTHER

## 2020-03-30 RX ORDER — METOPROLOL SUCCINATE 25 MG/1
TABLET, EXTENDED RELEASE ORAL
Qty: 15 TABLET | Refills: 11 | Status: SHIPPED | OUTPATIENT
Start: 2020-03-30 | End: 2021-04-23

## 2020-04-23 ENCOUNTER — TELEPHONE (OUTPATIENT)
Dept: CARDIOLOGY | Age: 50
End: 2020-04-23

## 2020-04-23 ENCOUNTER — APPOINTMENT (OUTPATIENT)
Dept: CARDIOLOGY | Age: 50
End: 2020-04-23

## 2020-05-26 ENCOUNTER — APPOINTMENT (OUTPATIENT)
Dept: CARDIOLOGY | Age: 50
End: 2020-05-26

## 2020-09-08 ENCOUNTER — HOSPITAL ENCOUNTER (OUTPATIENT)
Facility: HOSPITAL | Age: 50
Setting detail: OBSERVATION
Discharge: HOME | End: 2020-09-09
Attending: EMERGENCY MEDICINE | Admitting: INTERNAL MEDICINE
Payer: COMMERCIAL

## 2020-09-08 ENCOUNTER — APPOINTMENT (OUTPATIENT)
Dept: GENERAL RADIOLOGY | Age: 50
End: 2020-09-08
Attending: EMERGENCY MEDICINE
Payer: COMMERCIAL

## 2020-09-08 ENCOUNTER — TELEPHONE (OUTPATIENT)
Dept: CARDIOLOGY | Age: 50
End: 2020-09-08

## 2020-09-08 DIAGNOSIS — R07.9 ACUTE CHEST PAIN: Primary | ICD-10-CM

## 2020-09-08 LAB
ALBUMIN SERPL-MCNC: 3.5 G/DL (ref 3.4–5)
ALBUMIN/GLOB SERPL: 0.9 {RATIO} (ref 1–2)
ALP LIVER SERPL-CCNC: 85 U/L (ref 41–108)
ALT SERPL-CCNC: 21 U/L (ref 13–56)
ANION GAP SERPL CALC-SCNC: 7 MMOL/L (ref 0–18)
AST SERPL-CCNC: 15 U/L (ref 15–37)
ATRIAL RATE: 79 BPM
BASOPHILS # BLD AUTO: 0.06 X10(3) UL (ref 0–0.2)
BASOPHILS NFR BLD AUTO: 1 %
BILIRUB SERPL-MCNC: 0.3 MG/DL (ref 0.1–2)
BUN BLD-MCNC: 14 MG/DL (ref 7–18)
BUN/CREAT SERPL: 19.4 (ref 10–20)
CALCIUM BLD-MCNC: 8.7 MG/DL (ref 8.5–10.1)
CHLORIDE SERPL-SCNC: 108 MMOL/L (ref 98–112)
CO2 SERPL-SCNC: 23 MMOL/L (ref 21–32)
CREAT BLD-MCNC: 0.72 MG/DL (ref 0.55–1.02)
D-DIMER: 0.45 UG/ML FEU (ref ?–0.51)
DEPRECATED RDW RBC AUTO: 39.4 FL (ref 35.1–46.3)
EOSINOPHIL # BLD AUTO: 0.14 X10(3) UL (ref 0–0.7)
EOSINOPHIL NFR BLD AUTO: 2.2 %
ERYTHROCYTE [DISTWIDTH] IN BLOOD BY AUTOMATED COUNT: 11.8 % (ref 11–15)
GLOBULIN PLAS-MCNC: 3.7 G/DL (ref 2.8–4.4)
GLUCOSE BLD-MCNC: 149 MG/DL (ref 70–99)
HCT VFR BLD AUTO: 39.7 % (ref 35–48)
HGB BLD-MCNC: 13.2 G/DL (ref 12–16)
IMM GRANULOCYTES # BLD AUTO: 0.02 X10(3) UL (ref 0–1)
IMM GRANULOCYTES NFR BLD: 0.3 %
LYMPHOCYTES # BLD AUTO: 1.57 X10(3) UL (ref 1–4)
LYMPHOCYTES NFR BLD AUTO: 25.2 %
M PROTEIN MFR SERPL ELPH: 7.2 G/DL (ref 6.4–8.2)
MCH RBC QN AUTO: 30.3 PG (ref 26–34)
MCHC RBC AUTO-ENTMCNC: 33.2 G/DL (ref 31–37)
MCV RBC AUTO: 91.3 FL (ref 80–100)
MONOCYTES # BLD AUTO: 0.57 X10(3) UL (ref 0.1–1)
MONOCYTES NFR BLD AUTO: 9.1 %
NEUTROPHILS # BLD AUTO: 3.87 X10 (3) UL (ref 1.5–7.7)
NEUTROPHILS # BLD AUTO: 3.87 X10(3) UL (ref 1.5–7.7)
NEUTROPHILS NFR BLD AUTO: 62.2 %
OSMOLALITY SERPL CALC.SUM OF ELEC: 289 MOSM/KG (ref 275–295)
P AXIS: 26 DEGREES
P-R INTERVAL: 130 MS
PLATELET # BLD AUTO: 297 10(3)UL (ref 150–450)
POTASSIUM SERPL-SCNC: 3.7 MMOL/L (ref 3.5–5.1)
Q-T INTERVAL: 368 MS
QRS DURATION: 72 MS
QTC CALCULATION (BEZET): 421 MS
R AXIS: 8 DEGREES
RBC # BLD AUTO: 4.35 X10(6)UL (ref 3.8–5.3)
SARS-COV-2 RNA RESP QL NAA+PROBE: NOT DETECTED
SODIUM SERPL-SCNC: 138 MMOL/L (ref 136–145)
T AXIS: 52 DEGREES
TROPONIN I SERPL-MCNC: <0.045 NG/ML (ref ?–0.04)
VENTRICULAR RATE: 79 BPM
WBC # BLD AUTO: 6.2 X10(3) UL (ref 4–11)

## 2020-09-08 PROCEDURE — 80053 COMPREHEN METABOLIC PANEL: CPT | Performed by: EMERGENCY MEDICINE

## 2020-09-08 PROCEDURE — 84484 ASSAY OF TROPONIN QUANT: CPT | Performed by: EMERGENCY MEDICINE

## 2020-09-08 PROCEDURE — 71045 X-RAY EXAM CHEST 1 VIEW: CPT | Performed by: EMERGENCY MEDICINE

## 2020-09-08 PROCEDURE — 85379 FIBRIN DEGRADATION QUANT: CPT | Performed by: INTERNAL MEDICINE

## 2020-09-08 PROCEDURE — 84484 ASSAY OF TROPONIN QUANT: CPT | Performed by: INTERNAL MEDICINE

## 2020-09-08 PROCEDURE — 84484 ASSAY OF TROPONIN QUANT: CPT

## 2020-09-08 PROCEDURE — 85025 COMPLETE CBC W/AUTO DIFF WBC: CPT

## 2020-09-08 PROCEDURE — 99215 OFFICE O/P EST HI 40 MIN: CPT | Performed by: INTERNAL MEDICINE

## 2020-09-08 PROCEDURE — 99285 EMERGENCY DEPT VISIT HI MDM: CPT

## 2020-09-08 PROCEDURE — 36415 COLL VENOUS BLD VENIPUNCTURE: CPT

## 2020-09-08 PROCEDURE — 85025 COMPLETE CBC W/AUTO DIFF WBC: CPT | Performed by: EMERGENCY MEDICINE

## 2020-09-08 PROCEDURE — 80053 COMPREHEN METABOLIC PANEL: CPT

## 2020-09-08 PROCEDURE — 93010 ELECTROCARDIOGRAM REPORT: CPT

## 2020-09-08 PROCEDURE — 93005 ELECTROCARDIOGRAM TRACING: CPT

## 2020-09-08 RX ORDER — ONDANSETRON 2 MG/ML
4 INJECTION INTRAMUSCULAR; INTRAVENOUS EVERY 4 HOURS PRN
Status: DISCONTINUED | OUTPATIENT
Start: 2020-09-08 | End: 2020-09-09

## 2020-09-08 RX ORDER — DEXTROSE AND SODIUM CHLORIDE 5; .45 G/100ML; G/100ML
INJECTION, SOLUTION INTRAVENOUS CONTINUOUS
Status: ACTIVE | OUTPATIENT
Start: 2020-09-08 | End: 2020-09-08

## 2020-09-08 RX ORDER — LISINOPRIL 2.5 MG/1
5 TABLET ORAL DAILY
Status: DISCONTINUED | OUTPATIENT
Start: 2020-09-09 | End: 2020-09-09

## 2020-09-08 RX ORDER — ASPIRIN 81 MG/1
324 TABLET, CHEWABLE ORAL ONCE
Status: COMPLETED | OUTPATIENT
Start: 2020-09-08 | End: 2020-09-08

## 2020-09-08 RX ORDER — SIMVASTATIN 10 MG
10 TABLET ORAL NIGHTLY
COMMUNITY

## 2020-09-08 RX ORDER — DOXEPIN HYDROCHLORIDE 50 MG/1
1 CAPSULE ORAL DAILY
Status: DISCONTINUED | OUTPATIENT
Start: 2020-09-09 | End: 2020-09-09

## 2020-09-08 RX ORDER — METOPROLOL SUCCINATE 25 MG/1
12.5 TABLET, EXTENDED RELEASE ORAL DAILY
COMMUNITY

## 2020-09-08 RX ORDER — ASPIRIN 81 MG/1
81 TABLET, CHEWABLE ORAL ONCE
Status: COMPLETED | OUTPATIENT
Start: 2020-09-09 | End: 2020-09-09

## 2020-09-08 RX ORDER — PRAVASTATIN SODIUM 20 MG
20 TABLET ORAL NIGHTLY
Status: DISCONTINUED | OUTPATIENT
Start: 2020-09-08 | End: 2020-09-09

## 2020-09-08 RX ORDER — ENOXAPARIN SODIUM 100 MG/ML
40 INJECTION SUBCUTANEOUS DAILY
Status: DISCONTINUED | OUTPATIENT
Start: 2020-09-08 | End: 2020-09-09

## 2020-09-08 RX ORDER — NITROGLYCERIN 0.4 MG/1
0.4 TABLET SUBLINGUAL ONCE
Status: COMPLETED | OUTPATIENT
Start: 2020-09-08 | End: 2020-09-08

## 2020-09-08 RX ORDER — MORPHINE SULFATE 4 MG/ML
2 INJECTION, SOLUTION INTRAMUSCULAR; INTRAVENOUS ONCE
Status: DISCONTINUED | OUTPATIENT
Start: 2020-09-08 | End: 2020-09-09

## 2020-09-08 RX ORDER — NITROGLYCERIN 0.4 MG/1
0.4 TABLET SUBLINGUAL EVERY 5 MIN PRN
Status: DISCONTINUED | OUTPATIENT
Start: 2020-09-08 | End: 2020-09-09

## 2020-09-08 RX ORDER — MULTIVIT-MIN/IRON FUM/FOLIC AC 7.5 MG-4
1 TABLET ORAL DAILY
COMMUNITY
End: 2021-07-22

## 2020-09-08 NOTE — ED PROVIDER NOTES
Patient Seen in: [facility] Emergency Department In [place]      History   Patient presents with:  Chest Pain Angina    Stated Complaint: chest pain    HPI    53-year-old female presents to the ED for evaluation of left-sided chest pain since yesterday. Constitutional:       Appearance: She is well-developed. HENT:      Head: Atraumatic.       Right Ear: External ear normal.      Left Ear: External ear normal.   Eyes:      Conjunctiva/sclera: Conjunctivae normal.   Neck:      Musculoskeletal: Normal ra 79  Rhythm: Sinus Rhythm  Reading: no ST elevation, no change from prior (12/20/18)                      MDM     47-year-old female presents with left-sided chest pressure since yesterday.   Pain is been constant, not reproducible on exam.  No ischemic gay

## 2020-09-08 NOTE — ED INITIAL ASSESSMENT (HPI)
C/o left sided chest pain on Friday lasted for few hrs and pain again last night until now. No nausea/ no sweating. Had same symptoms last year. All tests came back negative.

## 2020-09-09 ENCOUNTER — APPOINTMENT (OUTPATIENT)
Dept: CV DIAGNOSTICS | Facility: HOSPITAL | Age: 50
End: 2020-09-09
Attending: INTERNAL MEDICINE
Payer: COMMERCIAL

## 2020-09-09 VITALS
BODY MASS INDEX: 25.44 KG/M2 | RESPIRATION RATE: 16 BRPM | TEMPERATURE: 99 F | OXYGEN SATURATION: 98 % | HEART RATE: 70 BPM | WEIGHT: 149 LBS | SYSTOLIC BLOOD PRESSURE: 113 MMHG | HEIGHT: 64 IN | DIASTOLIC BLOOD PRESSURE: 68 MMHG

## 2020-09-09 LAB
ALBUMIN SERPL-MCNC: 3.1 G/DL (ref 3.4–5)
ALBUMIN/GLOB SERPL: 0.8 {RATIO} (ref 1–2)
ALP LIVER SERPL-CCNC: 59 U/L (ref 41–108)
ALT SERPL-CCNC: 18 U/L (ref 13–56)
ANION GAP SERPL CALC-SCNC: 4 MMOL/L (ref 0–18)
AST SERPL-CCNC: 22 U/L (ref 15–37)
BILIRUB SERPL-MCNC: 0.5 MG/DL (ref 0.1–2)
BUN BLD-MCNC: 12 MG/DL (ref 7–18)
BUN/CREAT SERPL: 17.4 (ref 10–20)
CALCIUM BLD-MCNC: 8.2 MG/DL (ref 8.5–10.1)
CHLORIDE SERPL-SCNC: 111 MMOL/L (ref 98–112)
CHOLEST SMN-MCNC: 156 MG/DL (ref ?–200)
CO2 SERPL-SCNC: 25 MMOL/L (ref 21–32)
CREAT BLD-MCNC: 0.69 MG/DL (ref 0.55–1.02)
GLOBULIN PLAS-MCNC: 3.7 G/DL (ref 2.8–4.4)
GLUCOSE BLD-MCNC: 101 MG/DL (ref 70–99)
HDLC SERPL-MCNC: 48 MG/DL (ref 40–59)
LDLC SERPL CALC-MCNC: 74 MG/DL (ref ?–100)
M PROTEIN MFR SERPL ELPH: 6.8 G/DL (ref 6.4–8.2)
NONHDLC SERPL-MCNC: 108 MG/DL (ref ?–130)
OSMOLALITY SERPL CALC.SUM OF ELEC: 290 MOSM/KG (ref 275–295)
POTASSIUM SERPL-SCNC: 3.8 MMOL/L (ref 3.5–5.1)
SODIUM SERPL-SCNC: 140 MMOL/L (ref 136–145)
TRIGL SERPL-MCNC: 172 MG/DL (ref 30–149)
TROPONIN I SERPL-MCNC: <0.045 NG/ML (ref ?–0.04)
VLDLC SERPL CALC-MCNC: 34 MG/DL (ref 0–30)

## 2020-09-09 PROCEDURE — 93350 STRESS TTE ONLY: CPT | Performed by: INTERNAL MEDICINE

## 2020-09-09 PROCEDURE — 93018 CV STRESS TEST I&R ONLY: CPT | Performed by: INTERNAL MEDICINE

## 2020-09-09 PROCEDURE — 93017 CV STRESS TEST TRACING ONLY: CPT | Performed by: INTERNAL MEDICINE

## 2020-09-09 PROCEDURE — 80061 LIPID PANEL: CPT | Performed by: INTERNAL MEDICINE

## 2020-09-09 PROCEDURE — 99214 OFFICE O/P EST MOD 30 MIN: CPT | Performed by: NURSE PRACTITIONER

## 2020-09-09 PROCEDURE — 80053 COMPREHEN METABOLIC PANEL: CPT | Performed by: INTERNAL MEDICINE

## 2020-09-09 PROCEDURE — 84484 ASSAY OF TROPONIN QUANT: CPT | Performed by: INTERNAL MEDICINE

## 2020-09-09 RX ORDER — POTASSIUM CHLORIDE 20 MEQ/1
40 TABLET, EXTENDED RELEASE ORAL ONCE
Status: COMPLETED | OUTPATIENT
Start: 2020-09-09 | End: 2020-09-09

## 2020-09-09 NOTE — PLAN OF CARE
NURSING DISCHARGE NOTE    Discharged home via wheelchair. Accompanied by . Belongings taken by patient.

## 2020-09-09 NOTE — PROGRESS NOTES
CARDIODIAGNOSTIC PRELIMINARY REPORT:     ARON protocol completed for 9:11 minutes with no new EKG changes noted; rare PVC's and PAC's     Denied cardiac symptoms  Base:  104/74, HR: 72; Peak: 168/78, HR: 151 (89% APMHR)    Second set of images pending

## 2020-09-09 NOTE — PLAN OF CARE
Assumed care at [address] Sum[address] Padilla pt on bed, on room air  Tolerable Left sided chest pain  \"needles  pricking me\", not worsen when taking a deep breath  Denies SOB  NSR on tele. Vital signs stable. Dr Felisa Russo saw pt on the bedside. Recd order.   Labs in am.Stress Encourage pt to monitor pain and request assistance  - Assess pain using appropriate pain scale  - Administer analgesics based on type and severity of pain and evaluate response  - Implement non-pharmacological measures as appropriate and evaluate response

## 2020-09-09 NOTE — CONSULTS
[facility]    PATIENT'S NAME: Joann Forman   ATTENDING PHYSICIAN: COBY Bordenis: Renetta Sarabia M.D.    PATIENT ACCOUNT#:   [de-identified]    LOCATION:  [address] [place] [address]  MEDICAL RECORD #:   [id number]       DATE OF BIRTH: nonicteric. NECK:  No carotid bruits. No increase in jugular pressure. LUNGS:  Clear. HEART:  S1, S2.  Regular without rubs or murmurs. No flap. No murmurs. No mitral valve prolapse. ABDOMEN:  Positive bowel sounds without bruits.   Soft, nontender

## 2020-09-09 NOTE — CONSULTS
Cardiology Consult Note     PRIMARY CARDIOLOGIST: [facility]      CONSULT FOR: Atypical chest pain x24 hours with history of hypertension, hypercholesterolemia       HISTORY: 53-year-old female with known history for hypertension hypercholesterolemia 24 hours wor

## 2020-09-09 NOTE — PROGRESS NOTES
09/09/20 1032   Clinical Encounter Type   Visited With Patient   Patient's Supportive Strategies/Resources [employer]/Patient finds community/spiritual support at [facility] in [place].     Patient Spiritual Encounters   Spiritual Needs Patient expresse

## 2020-09-09 NOTE — H&P
[facility]    History and Physical    Joan Renterial Patient Status:  Observation    10/7/1970 MRN [id number]   [facility] 0SW-A Attending Ellen Quiros MD   Hosp Day # 0 PCP Terry Long MD     Date:  2020  Date of Admission: Take 5 mg by mouth daily. Review of Systems:   Review of Systems  negative  Physical Exam:   Vital Signs:  Blood pressure 113/68, pulse 70, temperature 98.5 °F (36.9 °C), temperature source Oral, resp.  rate 16, height 5' 4\" (1.626 m), weight 149

## 2020-09-09 NOTE — PROGRESS NOTES
[facility]  Cardiology Progress Note    Sherrine Bodily Patient Status:  Observation    10/7/1970 MRN [id number]   [facility] 0SW-A Attending Lizzy Walters MD   Hosp Day # 0 PCP Taya Bejarano MD     Subjective:  Denies CP or SOB, ha occurred.     Resting echocardiographic images revealed normal chamber sizes and valvular structure with uniformly normal left ventricular contractility.     Immediate post exercise imaging revealed a decrease in left ventricular size and increased contrac

## 2020-09-10 RX ORDER — LISINOPRIL 10 MG/1
TABLET ORAL
Qty: 30 TABLET | Refills: 5 | Status: SHIPPED | OUTPATIENT
Start: 2020-09-10

## 2020-09-16 ENCOUNTER — HOSPITAL ENCOUNTER (OUTPATIENT)
Dept: CT IMAGING | Age: 50
Discharge: HOME | End: 2020-09-16
Attending: INTERNAL MEDICINE

## 2020-09-16 DIAGNOSIS — R07.9 ACUTE CHEST PAIN: ICD-10-CM

## 2020-09-17 ENCOUNTER — TELEPHONE (OUTPATIENT)
Dept: CARDIOLOGY | Age: 50
End: 2020-09-17

## 2020-09-28 ENCOUNTER — TELEPHONE (OUTPATIENT)
Dept: CARDIOLOGY | Age: 50
End: 2020-09-28

## 2021-04-23 RX ORDER — METOPROLOL SUCCINATE 25 MG/1
TABLET, EXTENDED RELEASE ORAL
Qty: 45 TABLET | Refills: 3 | Status: SHIPPED | OUTPATIENT
Start: 2021-04-23

## 2021-04-23 RX ORDER — SIMVASTATIN 10 MG
TABLET ORAL
Qty: 90 TABLET | Refills: 3 | Status: SHIPPED | OUTPATIENT
Start: 2021-04-23

## 2021-07-15 ENCOUNTER — OFFICE VISIT (OUTPATIENT)
Dept: DERMATOLOGY | Age: 51
End: 2021-07-15

## 2021-07-15 DIAGNOSIS — L24.4 IRRITANT CONTACT DERMATITIS DUE TO DRUG IN CONTACT WITH SKIN: Primary | ICD-10-CM

## 2021-07-15 PROCEDURE — 99203 OFFICE O/P NEW LOW 30 MIN: CPT | Performed by: DERMATOLOGY

## 2021-07-15 RX ORDER — OXYBUTYNIN CHLORIDE 5 MG/1
5 TABLET, EXTENDED RELEASE ORAL DAILY
COMMUNITY
Start: 2021-06-05

## 2021-07-15 RX ORDER — CIPROFLOXACIN 500 MG/1
500 TABLET, FILM COATED ORAL 2 TIMES DAILY
COMMUNITY
Start: 2021-05-26

## 2021-07-15 RX ORDER — MULTIVIT-MIN/IRON FUM/FOLIC AC 7.5 MG-4
1 TABLET ORAL
COMMUNITY

## 2021-07-15 RX ORDER — DESONIDE 0.5 MG/G
OINTMENT TOPICAL 2 TIMES DAILY
Qty: 15 G | Refills: 0 | Status: SHIPPED | OUTPATIENT
Start: 2021-07-15

## 2021-07-22 ENCOUNTER — OFFICE VISIT (OUTPATIENT)
Dept: OBGYN CLINIC | Facility: CLINIC | Age: 51
End: 2021-07-22
Payer: COMMERCIAL

## 2021-07-22 VITALS
DIASTOLIC BLOOD PRESSURE: 82 MMHG | SYSTOLIC BLOOD PRESSURE: 110 MMHG | WEIGHT: 145.63 LBS | HEIGHT: 64.5 IN | BODY MASS INDEX: 24.56 KG/M2 | HEART RATE: 80 BPM

## 2021-07-22 DIAGNOSIS — Z12.31 ENCOUNTER FOR SCREENING MAMMOGRAM FOR BREAST CANCER: ICD-10-CM

## 2021-07-22 DIAGNOSIS — Z12.4 ENCOUNTER FOR SCREENING FOR CERVICAL CANCER: ICD-10-CM

## 2021-07-22 DIAGNOSIS — N39.41 URGENCY INCONTINENCE: ICD-10-CM

## 2021-07-22 DIAGNOSIS — R35.1 NOCTURIA: ICD-10-CM

## 2021-07-22 DIAGNOSIS — N39.3 SUI (STRESS URINARY INCONTINENCE, FEMALE): ICD-10-CM

## 2021-07-22 DIAGNOSIS — Z01.419 WELL WOMAN EXAM WITH ROUTINE GYNECOLOGICAL EXAM: Primary | ICD-10-CM

## 2021-07-22 DIAGNOSIS — Z12.11 SCREENING FOR COLON CANCER: ICD-10-CM

## 2021-07-22 PROCEDURE — 99386 PREV VISIT NEW AGE 40-64: CPT | Performed by: OBSTETRICS & GYNECOLOGY

## 2021-07-22 PROCEDURE — 3074F SYST BP LT 130 MM HG: CPT | Performed by: OBSTETRICS & GYNECOLOGY

## 2021-07-22 PROCEDURE — 3008F BODY MASS INDEX DOCD: CPT | Performed by: OBSTETRICS & GYNECOLOGY

## 2021-07-22 PROCEDURE — 3079F DIAST BP 80-89 MM HG: CPT | Performed by: OBSTETRICS & GYNECOLOGY

## 2021-07-22 NOTE — PROGRESS NOTES
[facility]  Obstetrics and Gynecology  History & Physical    CC: Patient is a new patient and here for a well woman exam     Subjective:     HPI: Lisa Olivares is a 44year old  female here for a well women exam. Patient reports increased file      Highest education level: Not on file    Occupational History      Not on file    Tobacco Use      Smoking status: Never Smoker      Smokeless tobacco: Never Used    Vaping Use      Vaping Use: Never used    Substance and Sexual Activity      [place] Problem Relation Age of Onset   • Diabetes Father    • Hypertension Mother    • Cancer Mother 45        Uterine, Pancreatic   • Heart Disorder Maternal Grandmother    • Heart Disorder Maternal Grandfather    • Heart Disorder Paternal Grandmother    • Oth ASCCP guidelines  - repeat pap smear today   Health maintenance  - encouraged to maintain weight at healthy BMI  - discussed importance of exercise and healthy eating  - self breast exam instructions provided  - bilateral screening mammogram recommendation

## 2021-07-27 LAB — HPV MRNA E6/E7: NOT DETECTED

## 2021-07-28 NOTE — PROGRESS NOTES
Please inform patient that Pap smear noted for atypical squamous cells of undetermined significance with negative HPV. Therefore, I would recommend to repeat in 1 year. My chart not available.

## 2021-07-28 NOTE — PROGRESS NOTES
Call to patient with , Roque Ro (ID # [id number]). Reported results and recommendations. Questions answered and patient states understanding.

## 2021-08-02 ENCOUNTER — HOSPITAL ENCOUNTER (OUTPATIENT)
Dept: MAMMOGRAPHY | Age: 51
Discharge: HOME | End: 2021-08-02
Attending: OBSTETRICS & GYNECOLOGY
Payer: COMMERCIAL

## 2021-08-02 DIAGNOSIS — Z12.31 ENCOUNTER FOR SCREENING MAMMOGRAM FOR BREAST CANCER: ICD-10-CM

## 2021-08-02 PROCEDURE — 77063 BREAST TOMOSYNTHESIS BI: CPT | Performed by: OBSTETRICS & GYNECOLOGY

## 2021-08-02 PROCEDURE — 77067 SCR MAMMO BI INCL CAD: CPT | Performed by: OBSTETRICS & GYNECOLOGY

## 2021-08-03 DIAGNOSIS — R92.2 DENSE BREASTS: Primary | ICD-10-CM

## 2021-08-03 NOTE — PROGRESS NOTES
Please inform patient of normal mammogram but dense breast tissue. She would benefit for supplemental imaging including bilateral whole breast US. If she desires additional imaging, please provide order.  Diagnosis code: dense breast tissue on mammogram.

## 2021-08-03 NOTE — PROGRESS NOTES
Patient informed of results. Verbalized understanding. No further questions or concerns. She wants to complete the US. Order entered.

## 2021-08-17 ENCOUNTER — LAB ENCOUNTER (OUTPATIENT)
Dept: LAB | Age: 51
End: 2021-08-17
Attending: INTERNAL MEDICINE
Payer: COMMERCIAL

## 2021-08-17 DIAGNOSIS — Z01.818 PRE-OP TESTING: ICD-10-CM

## 2021-08-19 LAB — SARS-COV-2 RNA RESP QL NAA+PROBE: NOT DETECTED

## 2021-08-20 PROBLEM — Z12.11 SPECIAL SCREENING FOR MALIGNANT NEOPLASM OF COLON: Status: ACTIVE | Noted: 2021-08-20

## 2021-09-01 ENCOUNTER — OFFICE VISIT (OUTPATIENT)
Dept: UROLOGY | Facility: HOSPITAL | Age: 51
End: 2021-09-01
Attending: OBSTETRICS & GYNECOLOGY
Payer: COMMERCIAL

## 2021-09-01 VITALS — WEIGHT: 145 LBS | TEMPERATURE: 97 F | HEIGHT: 66 IN | BODY MASS INDEX: 23.3 KG/M2

## 2021-09-01 DIAGNOSIS — N81.11 CYSTOCELE, MIDLINE: ICD-10-CM

## 2021-09-01 DIAGNOSIS — R39.15 URGENCY OF MICTURITION: ICD-10-CM

## 2021-09-01 DIAGNOSIS — N81.6 RECTOCELE: ICD-10-CM

## 2021-09-01 DIAGNOSIS — R35.0 URINARY FREQUENCY: Primary | ICD-10-CM

## 2021-09-01 DIAGNOSIS — R35.1 NOCTURIA: ICD-10-CM

## 2021-09-01 DIAGNOSIS — N39.3 SUI (STRESS URINARY INCONTINENCE, FEMALE): ICD-10-CM

## 2021-09-01 DIAGNOSIS — N81.2 INCOMPLETE UTEROVAGINAL PROLAPSE: ICD-10-CM

## 2021-09-01 DIAGNOSIS — R39.89 BLADDER PAIN: ICD-10-CM

## 2021-09-01 LAB
CONTROL RUN WITHIN 24 HOURS?: YES
LEUKOCYTE ESTERASE URINE: NEGATIVE
NITRITE URINE: NEGATIVE

## 2021-09-01 PROCEDURE — 99212 OFFICE O/P EST SF 10 MIN: CPT

## 2021-09-01 PROCEDURE — 51701 INSERT BLADDER CATHETER: CPT | Performed by: OBSTETRICS & GYNECOLOGY

## 2021-09-01 PROCEDURE — 87086 URINE CULTURE/COLONY COUNT: CPT | Performed by: OBSTETRICS & GYNECOLOGY

## 2021-09-01 PROCEDURE — 81002 URINALYSIS NONAUTO W/O SCOPE: CPT | Performed by: OBSTETRICS & GYNECOLOGY

## 2021-09-01 RX ORDER — SOLIFENACIN SUCCINATE 5 MG/1
5 TABLET, FILM COATED ORAL DAILY
Qty: 30 TABLET | Refills: 1 | Status: SHIPPED | OUTPATIENT
Start: 2021-09-01 | End: 2021-11-03

## 2021-09-01 NOTE — PROGRESS NOTES
ID: Deanna Jessica  : 10/7/1970  Date: 2021     Referred by Dr. Lauren Marie MD    Patient presents with:  Urinary Frequency: referred by Dr. [address]  Nocturia  Incontinence: FROYLAN      HPI:  The patient is a 44year-old female,  (vaginal deliver Uterine, Pancreatic   • Colon Polyps Mother    • Heart Disorder Maternal Grandmother    • Heart Disorder Maternal Grandfather    • Heart Disorder Paternal Grandmother    • Other (Other) Paternal Grandfather    • Uterine Cancer Maternal Aunt    • Ovarian Ca tender  Bladder:no fullness, non tender  Vagina: No atrophy, no lesions, + tenderness   Cervix: no bleeding, no lesions, non tender  Uterus: soft, mobile, non tender  Adnexa:no masses, non tender  Perineum: non tender  Anus: no hemorrhoids  Rectum: deferre modification, discussed pharmacologic and nonpharmacologic mgmt options for urinary symptoms. Discussed dietary & weight management with potential improvements in symptoms with weight loss.     Diagnostic Items:  Urine C&S    Medications Discussed:  Judd Rubinstein

## 2021-09-01 NOTE — PROCEDURES
Pt voided in privacy of bathroom, 150 ml, straight cathed per Dr. Fiorella Mcpherson for PVR = 5 ml. Collected, see dipstick and sent for culture.

## 2021-09-08 ENCOUNTER — TELEPHONE (OUTPATIENT)
Dept: PHYSICAL THERAPY | Facility: HOSPITAL | Age: 51
End: 2021-09-08

## 2021-10-21 ENCOUNTER — OFFICE VISIT (OUTPATIENT)
Dept: PHYSICAL THERAPY | Age: 51
End: 2021-10-21
Attending: OBSTETRICS & GYNECOLOGY
Payer: COMMERCIAL

## 2021-10-21 DIAGNOSIS — R39.15 URGENCY OF MICTURITION: ICD-10-CM

## 2021-10-21 DIAGNOSIS — R35.0 URINARY FREQUENCY: ICD-10-CM

## 2021-10-21 DIAGNOSIS — N39.3 SUI (STRESS URINARY INCONTINENCE, FEMALE): ICD-10-CM

## 2021-10-21 DIAGNOSIS — R35.1 NOCTURIA: ICD-10-CM

## 2021-10-21 PROCEDURE — 97110 THERAPEUTIC EXERCISES: CPT

## 2021-10-21 PROCEDURE — 97161 PT EVAL LOW COMPLEX 20 MIN: CPT

## 2021-10-21 NOTE — PROGRESS NOTES
MUSCULOSKELETAL AND PELVIC FLOOR EVALUATION:   Referring Physician: Dr. Millie Salazar  Diagnosis: Urinary frequency (R35.0)  Nocturia (R35.1)  Urgency of micturition (R39.15)  FROYLAN (stress urinary incontinence, female) (N39.3)   Date of Service: 10/21/2021 no    BOWEL HABITS  Types of symptoms: Constipation managed with Fiber  Frequency of bowel movements: 2-3  Stool consistency: [place] Stool Scale: normal  Do you strain with defecation: No   Laxative use: No    SEXUAL HEALTH STATUS  More urgency after inte normal bilaterally  Anal [place]: Not Tested    Internal Examination   Scar: None    Pelvic Floor Muscle strength: (PERF= Power/Endurance/Reps/Fast) MMT: 3+/5/fatigue at 5/10 in 10 seconds  External Anal Sphincter: NA  Accessory Muscle Use: gluteals    Tissue options and has agreed to actively participate in planning and for this course of care. Thank you for your referral. Please co-sign or sign and return this letter via fax as soon as possible to [phone].  If you have any questions, please contact me

## 2021-10-28 ENCOUNTER — OFFICE VISIT (OUTPATIENT)
Dept: PHYSICAL THERAPY | Age: 51
End: 2021-10-28
Attending: OBSTETRICS & GYNECOLOGY
Payer: COMMERCIAL

## 2021-10-28 DIAGNOSIS — R35.1 NOCTURIA: ICD-10-CM

## 2021-10-28 DIAGNOSIS — R39.15 URGENCY OF MICTURITION: ICD-10-CM

## 2021-10-28 DIAGNOSIS — R35.0 URINARY FREQUENCY: ICD-10-CM

## 2021-10-28 DIAGNOSIS — N39.3 SUI (STRESS URINARY INCONTINENCE, FEMALE): ICD-10-CM

## 2021-10-28 PROCEDURE — 97112 NEUROMUSCULAR REEDUCATION: CPT

## 2021-10-28 NOTE — PROGRESS NOTES
Dx:  Urinary frequency (R35.0)  Nocturia (R35.1)  Urgency of micturition (R39.15)  FROYLAN (stress urinary incontinence, female) (N39.3)  Insurance (Authorized # of Visits):  8 visits recommended           Authorizing Physician: Dr. Glen Pichardo MD visit: non alternate x 10     PF pre-activation double arm raises supine x 10    Bladder diary                           HEP:  NMRED : Pelvic floor isometric 10 sec x 10 PF pre-activation BKFO, table top, double arm raise x 10    Charges: NMRED 3       Total Timed Tr x 5 - bed 10 times, waking for daughter with disability, not at home with daughter wake 2 times    Fluid Intake: 6-7 glasses of water, stop around 400  Bladder irritants: Small amount coffee  Post void dribble: sometimes  Empty bladder just in case: Yes  D Yes    External Observation:   Voluntary contraction: present   Voluntary relaxation: present  Involuntary contraction: present  Involuntary relaxation: present    Mons pubis: WNL  Labia majora: WNL  Labia minora:  WNL  Urethral meatus: WNL  Introitus: WNL total of 8 visits over a 90 day period.   Treatment will include: Manual Therapy, Neuromuscular Re-education, Therapeutic Exercise and Home Exercise Program instruction     Education or treatment limitation: None  Rehab Potential:good      Patient was advis

## 2021-11-03 ENCOUNTER — OFFICE VISIT (OUTPATIENT)
Dept: UROLOGY | Facility: HOSPITAL | Age: 51
End: 2021-11-03
Attending: OBSTETRICS & GYNECOLOGY
Payer: COMMERCIAL

## 2021-11-03 VITALS — BODY MASS INDEX: 23.3 KG/M2 | TEMPERATURE: 98 F | HEIGHT: 66 IN | WEIGHT: 145 LBS

## 2021-11-03 DIAGNOSIS — R35.0 URINARY FREQUENCY: Primary | ICD-10-CM

## 2021-11-03 DIAGNOSIS — N81.11 CYSTOCELE, MIDLINE: ICD-10-CM

## 2021-11-03 DIAGNOSIS — N39.3 SUI (STRESS URINARY INCONTINENCE, FEMALE): ICD-10-CM

## 2021-11-03 DIAGNOSIS — N81.2 INCOMPLETE UTEROVAGINAL PROLAPSE: ICD-10-CM

## 2021-11-03 DIAGNOSIS — N81.6 RECTOCELE: ICD-10-CM

## 2021-11-03 DIAGNOSIS — N39.41 URGENCY INCONTINENCE: ICD-10-CM

## 2021-11-03 DIAGNOSIS — R39.15 URGENCY OF MICTURITION: ICD-10-CM

## 2021-11-03 PROCEDURE — 99212 OFFICE O/P EST SF 10 MIN: CPT

## 2021-11-03 RX ORDER — SOLIFENACIN SUCCINATE 5 MG/1
5 TABLET, FILM COATED ORAL DAILY
Qty: 90 TABLET | Refills: 3 | Status: SHIPPED | OUTPATIENT
Start: 2021-11-03

## 2021-11-03 NOTE — PROGRESS NOTES
ID: Donovan Casey  : 10/7/1970  Date: 11/3/21      Patient presents with:  OAB f/u      HPI:  The patient is a 46year-old female,  (vaginal deliveries), who was last seen in the office on 21.   Please refer to previous office note for full detai (both)  Currently Sexually Active: Yes      Review of Systems:    A comprehensive 12 point review of systems was completed. Pertinent positives noted in the the HPI.   Denies CP  Denies SOB    Vitals:   11/03/21  1403   Temp: 97.7 °F (36.5 °C)     General:

## 2021-11-04 ENCOUNTER — OFFICE VISIT (OUTPATIENT)
Dept: PHYSICAL THERAPY | Age: 51
End: 2021-11-04
Attending: OBSTETRICS & GYNECOLOGY
Payer: COMMERCIAL

## 2021-11-04 DIAGNOSIS — R39.15 URGENCY OF MICTURITION: ICD-10-CM

## 2021-11-04 DIAGNOSIS — R35.0 URINARY FREQUENCY: ICD-10-CM

## 2021-11-04 DIAGNOSIS — N39.3 SUI (STRESS URINARY INCONTINENCE, FEMALE): ICD-10-CM

## 2021-11-04 DIAGNOSIS — R35.1 NOCTURIA: ICD-10-CM

## 2021-11-04 PROCEDURE — 97110 THERAPEUTIC EXERCISES: CPT

## 2021-11-04 NOTE — PROGRESS NOTES
Dx:  Urinary frequency (R35.0)  Nocturia (R35.1)  Urgency of micturition (R39.15)  FROYLAN (stress urinary incontinence, female) (N39.3)  Insurance (Authorized # of Visits):  8 visits recommended           Authorizing Physician: Dr. Tila Olmos  Next MD visit: non 10/28/2021  TX#: 2/8 Date:  11/4/2021                TX#: 3/8 Date:                 TX#: 4/ Date:                 TX#: 5/ Date:    Tx#: 6/   NMRED:  PF erica 10 sec x 10     PF pre-activation BKFO alternate  X 10    PF pre-activation table top alternate x 10 Pt goals include sleep longer without voiding, less leaking and wetness, learn how to do exercises. Past medical history was reviewed with Jennifer. Significant findings include Fibroids; Anxiety; Essential hypertension; Hyperlipidemia;  Visual impairment; urinary incontinence, female) (N39.3). Pt and PT discussed evaluation findings, pathology, POC and HEP. Pt voiced understanding and performs HEP correctly without reported pain.  Skilled Pelvic Physical Therapy is medically necessary to address the above i OF CARE:    Goals: (to be met in 8 visits)    Patient will demonstrate independence in performing home exercise program.  Patient will demonstrate pelvic floor contraction average 10.0 mvc in 10 sec and 10 reps to decrease urinary incontinence.    Patient w

## 2021-11-11 ENCOUNTER — APPOINTMENT (OUTPATIENT)
Dept: PHYSICAL THERAPY | Age: 51
End: 2021-11-11
Attending: OBSTETRICS & GYNECOLOGY
Payer: COMMERCIAL

## 2021-11-18 ENCOUNTER — OFFICE VISIT (OUTPATIENT)
Dept: PHYSICAL THERAPY | Age: 51
End: 2021-11-18
Attending: OBSTETRICS & GYNECOLOGY
Payer: COMMERCIAL

## 2021-11-18 DIAGNOSIS — R39.15 URGENCY OF MICTURITION: ICD-10-CM

## 2021-11-18 DIAGNOSIS — R35.0 URINARY FREQUENCY: ICD-10-CM

## 2021-11-18 DIAGNOSIS — R35.1 NOCTURIA: ICD-10-CM

## 2021-11-18 DIAGNOSIS — N39.3 SUI (STRESS URINARY INCONTINENCE, FEMALE): ICD-10-CM

## 2021-11-18 PROCEDURE — 97110 THERAPEUTIC EXERCISES: CPT

## 2021-11-18 PROCEDURE — 97112 NEUROMUSCULAR REEDUCATION: CPT

## 2021-11-18 NOTE — PROGRESS NOTES
Dx:  Urinary frequency (R35.0)  Nocturia (R35.1)  Urgency of micturition (R39.15)  FROYLAN (stress urinary incontinence, female) (N39.3)  Insurance (Authorized # of Visits):  8 visits recommended           Authorizing Physician: Dr. Poonam Scott  Next MD visit: non TX#: 5/ Date: Tx#: 6/   NMRED:  PF erica 10 sec x 10     PF pre-activation BKFO alternate  X 10    PF pre-activation table top alternate x 10     PF pre-activation double arm raises supine x 10    Bladder diary    Urge deferment instruction today:   1. - no physical work,  Daughter 100% dependent with lifting and transfers    POPDI-6 : 62.5  CRAD-8: 46.88  CURTIS-6: 95.83  PFDI-20 : 205 /300      Jennifer describes prior level of function leaking frequency increased.  . Pt goals include sleep longer without vo laugh, sneeze, urgency, nocturia and urge UI shorten walks for exercise. Signs and symptoms are consistent with diagnosis of Urinary frequency (R35.0), Nocturia (R35.1), Urgency of micturition (R39.15), FROYLAN (stress urinary incontinence, female) (N39.3).  P knack/pelvic muscle brace    Patient was instructed in and issued a HEP for: diet/bladder/bowel diary     Charges: PT Eval Low Complexity, Ex 2      Total Timed Treatment: 90 min     Total Treatment Time: 90 min     PLAN OF CARE:    Goals: (to be met in 8

## 2021-11-23 ENCOUNTER — OFFICE VISIT (OUTPATIENT)
Dept: PHYSICAL THERAPY | Age: 51
End: 2021-11-23
Attending: OBSTETRICS & GYNECOLOGY
Payer: COMMERCIAL

## 2021-11-23 DIAGNOSIS — R35.1 NOCTURIA: ICD-10-CM

## 2021-11-23 DIAGNOSIS — R35.0 URINARY FREQUENCY: ICD-10-CM

## 2021-11-23 DIAGNOSIS — R39.15 URGENCY OF MICTURITION: ICD-10-CM

## 2021-11-23 DIAGNOSIS — N39.3 SUI (STRESS URINARY INCONTINENCE, FEMALE): ICD-10-CM

## 2021-11-23 PROCEDURE — 97112 NEUROMUSCULAR REEDUCATION: CPT

## 2021-11-23 NOTE — PROGRESS NOTES
Dx:  Urinary frequency (R35.0)  Nocturia (R35.1)  Urgency of micturition (R39.15)  FROYLAN (stress urinary incontinence, female) (N39.3)  Insurance (Authorized # of Visits):  8 visits recommended           Authorizing Physician: Dr. Glen Pichardo MD visit: non for NMRED to improve pelvic floor strength, awareness and coordination to reduce leaking. Bladder education and training to reduce urgency.    Date: 10/28/2021  TX#: 2/8 Date:  11/4/2021                TX#: 3/8 Date: 11/18/2021                 TX#: 4/8 Date (R35.0)  Nocturia (R35.1)  Urgency of micturition (R39.15)  FROYLAN (stress urinary incontinence, female) (N39.3)   Date of Service: 10/21/2021     PATIENT SUMMARY   Darryl Concepcion is a 46year old female  who presents to therapy today with complaints of noctur consistency: [place] Stool Scale: normal  Do you strain with defecation: No   Laxative use: No    SEXUAL HEALTH STATUS  More urgency after intercourse  Sexual [place] Status: marital -  Pain with initial and/or deep penetration: dyspareunia  Pain with (PERF= Power/Endurance/Reps/Fast) MMT: 3+/5/fatigue at 5/10 in 10 seconds  External Anal Sphincter: NA  Accessory Muscle Use: gluteals    Tissue Laxity Test:  Anterior Wall: WNL  Posterior Wall: WNL  Apical: WNL    Internal Palpation: WNL     Today's Treat referral. Please co-sign or sign and return this letter via fax as soon as possible to [phone].  If you have any questions, please contact me at Dept: [phone]    Sincerely,  Electronically signed by therapist: Juan Alberto Baker PT  Physician's certifi

## 2021-12-02 ENCOUNTER — OFFICE VISIT (OUTPATIENT)
Dept: PHYSICAL THERAPY | Age: 51
End: 2021-12-02
Attending: OBSTETRICS & GYNECOLOGY
Payer: COMMERCIAL

## 2021-12-02 PROCEDURE — 97112 NEUROMUSCULAR REEDUCATION: CPT

## 2021-12-02 PROCEDURE — 97110 THERAPEUTIC EXERCISES: CPT

## 2021-12-02 NOTE — PROGRESS NOTES
Dx:  Urinary frequency (R35.0)  Nocturia (R35.1)  Urgency of micturition (R39.15)  FROYLAN (stress urinary incontinence, female) (N39.3)  Insurance (Authorized # of Visits):  8 visits recommended           Authorizing Physician: Dr. Lan ref.  provider found  Nex leaking with urgency when utilizing urge deferment exercises.  Improve      Plan: One more visit to progress to final HEP  Date: 10/28/2021  TX#: 2/8 Date:  11/4/2021                TX#: 3/8 Date: 11/18/2021                 TX#: 4/8 Date:  11/23/2021 HEP:   NMRED : Pelvic floor isometric 10 sec x 10 PF pre-activation BKFO, table top, double arm raise x 10. 12/2/2021 see HEP above  Limited time due to post op phone call from hospital  Charges: NMRED2 ( 25 min)  TE 1 ( 10 min   Total Timed Treatment: pad  Nocturia: x 5 - bed 10 times, waking for daughter with disability, not at home with daughter wake 2 times    Fluid Intake: 6-7 glasses of water, stop around 400  Bladder irritants: Small amount coffee  Post void dribble: sometimes  Empty bladder just given: Yes    External Observation:   Voluntary contraction: present   Voluntary relaxation: present  Involuntary contraction: present  Involuntary relaxation: present    Mons pubis: WNL  Labia majora: WNL  Labia minora:  WNL  Urethral meatus: WNL  Introitu or a total of 8 visits over a 90 day period.   Treatment will include: Manual Therapy, Neuromuscular Re-education, Therapeutic Exercise and Home Exercise Program instruction     Education or treatment limitation: None  Rehab Potential:good      Patient was

## 2021-12-14 ENCOUNTER — APPOINTMENT (OUTPATIENT)
Dept: PHYSICAL THERAPY | Age: 51
End: 2021-12-14
Attending: OBSTETRICS & GYNECOLOGY
Payer: COMMERCIAL

## 2021-12-16 ENCOUNTER — OFFICE VISIT (OUTPATIENT)
Dept: PHYSICAL THERAPY | Age: 51
End: 2021-12-16
Attending: INTERNAL MEDICINE
Payer: COMMERCIAL

## 2021-12-16 PROCEDURE — 97110 THERAPEUTIC EXERCISES: CPT

## 2021-12-16 PROCEDURE — 97112 NEUROMUSCULAR REEDUCATION: CPT

## 2021-12-16 NOTE — PROGRESS NOTES
Discharge Summary      Dx:  Urinary frequency (R35.0)  Nocturia (R35.1)  Urgency of micturition (R39.15)  FROYLAN (stress urinary incontinence, female) (N39.3)  Insurance (Authorized # of Visits):  8 visits recommended           Authorizing Physician: Dr. Mikal Godoy urgency. MET  Patient will report no leaking with urgency when utilizing urge deferment exercises.  MET        Plan:  Discharge to continue with advance program.     Patient/Family/Caregiver was advised of these findings, precautions, and treatment options exercises          NMRED  Supine 5 sec x 10 PF erica     PF pre-activation sit to   stand  X 10     PF pre-activation BS  X 10     PF pre-activation forward lunge on step x 10  PF pre-activation side lunge on step x 10  PF pre-activation with double leg hop No  Obstetrical/Gynecological history: G 2 P 2, Menstrual cycle abnormal cycle - jeannine- menopausal - skip periods  Urodynamic Test: to address  Manometry: no  Occupation/Activities:  part time - no physical work,  Daughter 100% dependent with lifting and tr UI, FROYLAN, and nocturia . The results of the objective tests and measures show pelvic floor muscle weakness and poor bladder habits.  Functional deficits include but are not limited to leaking with cough, laugh, sneeze, urgency, nocturia and urge UI shorten evaluation and expectations with treatment outcomes.  Educated on pelvic anatomy and function with diagrams and pelvic model, bladder normatives, instructed in bladder, bowel, and diet diary log and issued handout  and knack/pelvic muscle brace     Patient treatment and while under my care.     X___________________________________________________ Date____________________     Certification From: 42/05/5899  To:1/19/2022

## 2022-02-23 ENCOUNTER — OFFICE VISIT (OUTPATIENT)
Dept: UROLOGY | Facility: CLINIC | Age: 52
End: 2022-02-23
Attending: OBSTETRICS & GYNECOLOGY
Payer: COMMERCIAL

## 2022-02-23 VITALS — HEIGHT: 66 IN | TEMPERATURE: 98 F | BODY MASS INDEX: 23.3 KG/M2 | WEIGHT: 145 LBS

## 2022-02-23 DIAGNOSIS — N39.3 SUI (STRESS URINARY INCONTINENCE, FEMALE): Primary | ICD-10-CM

## 2022-02-23 DIAGNOSIS — N81.11 CYSTOCELE, MIDLINE: ICD-10-CM

## 2022-02-23 DIAGNOSIS — N81.2 INCOMPLETE UTEROVAGINAL PROLAPSE: ICD-10-CM

## 2022-02-23 DIAGNOSIS — N39.41 URGENCY INCONTINENCE: ICD-10-CM

## 2022-02-23 DIAGNOSIS — N81.6 RECTOCELE: ICD-10-CM

## 2022-02-23 PROCEDURE — 99212 OFFICE O/P EST SF 10 MIN: CPT

## 2022-03-24 ENCOUNTER — OFFICE VISIT (OUTPATIENT)
Dept: DERMATOLOGY | Age: 52
End: 2022-03-24

## 2022-03-24 DIAGNOSIS — L25.9 CONTACT DERMATITIS, UNSPECIFIED CONTACT DERMATITIS TYPE, UNSPECIFIED TRIGGER: Primary | ICD-10-CM

## 2022-03-24 PROCEDURE — 99213 OFFICE O/P EST LOW 20 MIN: CPT | Performed by: DERMATOLOGY

## 2022-03-24 PROCEDURE — 95044 PATCH/APPLICATION TESTS: CPT | Performed by: DERMATOLOGY

## 2022-03-24 RX ORDER — ALCLOMETASONE DIPROPIONATE 0.5 MG/G
OINTMENT TOPICAL 2 TIMES DAILY
Qty: 30 G | Refills: 0 | Status: SHIPPED | OUTPATIENT
Start: 2022-03-24

## 2022-03-26 ENCOUNTER — NURSE ONLY (OUTPATIENT)
Dept: DERMATOLOGY | Age: 52
End: 2022-03-26

## 2022-03-26 DIAGNOSIS — L23.1 CONTACT DERMATITIS DUE TO ADHESIVES, UNSPECIFIED CONTACT DERMATITIS TYPE: Primary | ICD-10-CM

## 2022-03-28 ENCOUNTER — OFFICE VISIT (OUTPATIENT)
Dept: DERMATOLOGY | Age: 52
End: 2022-03-28

## 2022-03-28 DIAGNOSIS — L25.8 CONTACT DERMATITIS DUE TO OTHER AGENT, UNSPECIFIED CONTACT DERMATITIS TYPE: Primary | ICD-10-CM

## 2022-03-28 DIAGNOSIS — D17.79 LIPOMA OF OTHER SPECIFIED SITES: ICD-10-CM

## 2022-03-28 PROCEDURE — 99213 OFFICE O/P EST LOW 20 MIN: CPT | Performed by: NURSE PRACTITIONER

## 2022-03-28 RX ORDER — LEVOCETIRIZINE DIHYDROCHLORIDE 5 MG/1
5 TABLET, FILM COATED ORAL EVERY EVENING
Qty: 30 TABLET | Refills: 2 | Status: SHIPPED | OUTPATIENT
Start: 2022-03-28

## 2022-03-29 ENCOUNTER — TELEPHONE (OUTPATIENT)
Dept: DERMATOLOGY | Age: 52
End: 2022-03-29

## 2022-04-01 ENCOUNTER — OFFICE VISIT (OUTPATIENT)
Dept: UROLOGY | Facility: CLINIC | Age: 52
End: 2022-04-01
Attending: OBSTETRICS & GYNECOLOGY
Payer: COMMERCIAL

## 2022-04-01 VITALS — WEIGHT: 145 LBS | BODY MASS INDEX: 23 KG/M2 | RESPIRATION RATE: 16 BRPM | TEMPERATURE: 98 F

## 2022-04-01 DIAGNOSIS — N39.41 URGENCY INCONTINENCE: ICD-10-CM

## 2022-04-01 DIAGNOSIS — N39.3 SUI (STRESS URINARY INCONTINENCE, FEMALE): Primary | ICD-10-CM

## 2022-04-01 LAB
BLOOD URINE: NEGATIVE
CONTROL RUN WITHIN 24 HOURS?: YES
LEUKOCYTE ESTERASE URINE: NEGATIVE
NITRITE URINE: NEGATIVE

## 2022-04-01 PROCEDURE — 51729 CYSTOMETROGRAM W/VP&UP: CPT

## 2022-04-01 PROCEDURE — 51784 ANAL/URINARY MUSCLE STUDY: CPT

## 2022-04-01 PROCEDURE — 51741 ELECTRO-UROFLOWMETRY FIRST: CPT

## 2022-04-01 PROCEDURE — 81002 URINALYSIS NONAUTO W/O SCOPE: CPT

## 2022-04-01 PROCEDURE — 51797 INTRAABDOMINAL PRESSURE TEST: CPT

## 2022-04-06 ENCOUNTER — OFFICE VISIT (OUTPATIENT)
Dept: UROLOGY | Facility: CLINIC | Age: 52
End: 2022-04-06
Attending: OBSTETRICS & GYNECOLOGY
Payer: COMMERCIAL

## 2022-04-06 VITALS — TEMPERATURE: 98 F | BODY MASS INDEX: 23.3 KG/M2 | HEIGHT: 66 IN | WEIGHT: 145 LBS

## 2022-04-06 DIAGNOSIS — N81.6 RECTOCELE: ICD-10-CM

## 2022-04-06 DIAGNOSIS — N39.3 SUI (STRESS URINARY INCONTINENCE, FEMALE): ICD-10-CM

## 2022-04-06 DIAGNOSIS — N81.11 CYSTOCELE, MIDLINE: ICD-10-CM

## 2022-04-06 DIAGNOSIS — N81.2 INCOMPLETE UTEROVAGINAL PROLAPSE: Primary | ICD-10-CM

## 2022-04-06 PROCEDURE — 99212 OFFICE O/P EST SF 10 MIN: CPT

## 2022-08-23 ENCOUNTER — LAB ENCOUNTER (OUTPATIENT)
Dept: LAB | Facility: HOSPITAL | Age: 52
End: 2022-08-23
Attending: INTERNAL MEDICINE
Payer: COMMERCIAL

## 2022-08-23 ENCOUNTER — ORDER TRANSCRIPTION (OUTPATIENT)
Dept: ADMINISTRATIVE | Facility: HOSPITAL | Age: 52
End: 2022-08-23

## 2022-08-23 DIAGNOSIS — Z01.818 PREOP EXAMINATION: ICD-10-CM

## 2022-08-23 DIAGNOSIS — Z11.59 ENCOUNTER FOR SCREENING FOR OTHER VIRAL DISEASES: ICD-10-CM

## 2022-08-23 DIAGNOSIS — Z01.818 PREOPERATIVE EXAMINATION, UNSPECIFIED: Primary | ICD-10-CM

## 2022-08-23 DIAGNOSIS — Z01.818 PREOP EXAMINATION: Primary | ICD-10-CM

## 2022-08-23 LAB
ANION GAP SERPL CALC-SCNC: 4 MMOL/L (ref 0–18)
APTT PPP: 28.3 SECONDS (ref 23.3–35.6)
BASOPHILS # BLD AUTO: 0.04 X10(3) UL (ref 0–0.2)
BASOPHILS NFR BLD AUTO: 0.6 %
BUN BLD-MCNC: 16 MG/DL (ref 7–18)
CALCIUM BLD-MCNC: 9.3 MG/DL (ref 8.5–10.1)
CHLORIDE SERPL-SCNC: 110 MMOL/L (ref 98–112)
CO2 SERPL-SCNC: 23 MMOL/L (ref 21–32)
CREAT BLD-MCNC: 0.72 MG/DL
EOSINOPHIL # BLD AUTO: 0.12 X10(3) UL (ref 0–0.7)
EOSINOPHIL NFR BLD AUTO: 1.7 %
ERYTHROCYTE [DISTWIDTH] IN BLOOD BY AUTOMATED COUNT: 11.4 %
FASTING STATUS PATIENT QL REPORTED: YES
GFR SERPLBLD BASED ON 1.73 SQ M-ARVRAT: 100 ML/MIN/1.73M2 (ref 60–?)
GLUCOSE BLD-MCNC: 106 MG/DL (ref 70–99)
HCT VFR BLD AUTO: 41.5 %
HGB BLD-MCNC: 13.5 G/DL
IMM GRANULOCYTES # BLD AUTO: 0.03 X10(3) UL (ref 0–1)
IMM GRANULOCYTES NFR BLD: 0.4 %
INR BLD: 0.94 (ref 0.85–1.16)
LYMPHOCYTES # BLD AUTO: 1.5 X10(3) UL (ref 1–4)
LYMPHOCYTES NFR BLD AUTO: 20.9 %
MCH RBC QN AUTO: 29.8 PG (ref 26–34)
MCHC RBC AUTO-ENTMCNC: 32.5 G/DL (ref 31–37)
MCV RBC AUTO: 91.6 FL
MONOCYTES # BLD AUTO: 0.59 X10(3) UL (ref 0.1–1)
MONOCYTES NFR BLD AUTO: 8.2 %
NEUTROPHILS # BLD AUTO: 4.9 X10 (3) UL (ref 1.5–7.7)
NEUTROPHILS # BLD AUTO: 4.9 X10(3) UL (ref 1.5–7.7)
NEUTROPHILS NFR BLD AUTO: 68.2 %
OSMOLALITY SERPL CALC.SUM OF ELEC: 286 MOSM/KG (ref 275–295)
PLATELET # BLD AUTO: 261 10(3)UL (ref 150–450)
POTASSIUM SERPL-SCNC: 3.9 MMOL/L (ref 3.5–5.1)
PROTHROMBIN TIME: 12.5 SECONDS (ref 11.6–14.8)
RBC # BLD AUTO: 4.53 X10(6)UL
SODIUM SERPL-SCNC: 137 MMOL/L (ref 136–145)
WBC # BLD AUTO: 7.2 X10(3) UL (ref 4–11)

## 2022-08-23 PROCEDURE — 80048 BASIC METABOLIC PNL TOTAL CA: CPT

## 2022-08-23 PROCEDURE — 85610 PROTHROMBIN TIME: CPT

## 2022-08-23 PROCEDURE — 85730 THROMBOPLASTIN TIME PARTIAL: CPT

## 2022-08-23 PROCEDURE — 85025 COMPLETE CBC W/AUTO DIFF WBC: CPT

## 2022-08-23 PROCEDURE — 36415 COLL VENOUS BLD VENIPUNCTURE: CPT

## 2022-08-24 LAB — SARS-COV-2 RNA RESP QL NAA+PROBE: NOT DETECTED

## 2022-08-25 RX ORDER — MAGNESIUM 200 MG
TABLET ORAL
Status: ON HOLD | COMMUNITY
End: 2022-08-30 | Stop reason: CLARIF

## 2022-08-26 ENCOUNTER — HOSPITAL ENCOUNTER (OUTPATIENT)
Dept: CV DIAGNOSTICS | Age: 52
Discharge: HOME | End: 2022-08-26
Attending: INTERNAL MEDICINE
Payer: COMMERCIAL

## 2022-08-26 DIAGNOSIS — Z01.810 ENCOUNTER FOR PRE-OPERATIVE CARDIOVASCULAR CLEARANCE: ICD-10-CM

## 2022-08-26 PROCEDURE — 93350 STRESS TTE ONLY: CPT | Performed by: INTERNAL MEDICINE

## 2022-08-26 PROCEDURE — 93018 CV STRESS TEST I&R ONLY: CPT | Performed by: INTERNAL MEDICINE

## 2022-08-26 PROCEDURE — 93017 CV STRESS TEST TRACING ONLY: CPT | Performed by: INTERNAL MEDICINE

## 2022-08-27 ENCOUNTER — LAB ENCOUNTER (OUTPATIENT)
Dept: LAB | Age: 52
End: 2022-08-27
Attending: OBSTETRICS & GYNECOLOGY
Payer: COMMERCIAL

## 2022-08-27 DIAGNOSIS — Z01.812 ENCOUNTER FOR PREOPERATIVE SCREENING LABORATORY TESTING FOR COVID-19 VIRUS: ICD-10-CM

## 2022-08-27 DIAGNOSIS — Z20.822 ENCOUNTER FOR PREOPERATIVE SCREENING LABORATORY TESTING FOR COVID-19 VIRUS: ICD-10-CM

## 2022-08-27 LAB — SARS-COV-2 RNA RESP QL NAA+PROBE: NOT DETECTED

## 2022-08-30 ENCOUNTER — HOSPITAL ENCOUNTER (OUTPATIENT)
Facility: HOSPITAL | Age: 52
Discharge: HOME | End: 2022-08-31
Attending: OBSTETRICS & GYNECOLOGY | Admitting: OBSTETRICS & GYNECOLOGY
Payer: COMMERCIAL

## 2022-08-30 ENCOUNTER — ANESTHESIA EVENT (OUTPATIENT)
Dept: SURGERY | Facility: HOSPITAL | Age: 52
End: 2022-08-30
Payer: COMMERCIAL

## 2022-08-30 ENCOUNTER — ANESTHESIA (OUTPATIENT)
Dept: SURGERY | Facility: HOSPITAL | Age: 52
End: 2022-08-30
Payer: COMMERCIAL

## 2022-08-30 DIAGNOSIS — Z01.812 ENCOUNTER FOR PREOPERATIVE SCREENING LABORATORY TESTING FOR COVID-19 VIRUS: Primary | ICD-10-CM

## 2022-08-30 DIAGNOSIS — Z20.822 ENCOUNTER FOR PREOPERATIVE SCREENING LABORATORY TESTING FOR COVID-19 VIRUS: Primary | ICD-10-CM

## 2022-08-30 LAB — B-HCG UR QL: NEGATIVE

## 2022-08-30 PROCEDURE — 0UBG7ZZ EXCISION OF VAGINA, VIA NATURAL OR ARTIFICIAL OPENING: ICD-10-PCS | Performed by: OBSTETRICS & GYNECOLOGY

## 2022-08-30 PROCEDURE — 0UQF0ZZ REPAIR CUL-DE-SAC, OPEN APPROACH: ICD-10-PCS | Performed by: OBSTETRICS & GYNECOLOGY

## 2022-08-30 PROCEDURE — 0TSD0ZZ REPOSITION URETHRA, OPEN APPROACH: ICD-10-PCS | Performed by: OBSTETRICS & GYNECOLOGY

## 2022-08-30 PROCEDURE — 0UT77ZZ RESECTION OF BILATERAL FALLOPIAN TUBES, VIA NATURAL OR ARTIFICIAL OPENING: ICD-10-PCS | Performed by: OBSTETRICS & GYNECOLOGY

## 2022-08-30 PROCEDURE — 88341 IMHCHEM/IMCYTCHM EA ADD ANTB: CPT | Performed by: OBSTETRICS & GYNECOLOGY

## 2022-08-30 PROCEDURE — 0USG7ZZ REPOSITION VAGINA, VIA NATURAL OR ARTIFICIAL OPENING: ICD-10-PCS | Performed by: OBSTETRICS & GYNECOLOGY

## 2022-08-30 PROCEDURE — 0JQC0ZZ REPAIR PELVIC REGION SUBCUTANEOUS TISSUE AND FASCIA, OPEN APPROACH: ICD-10-PCS | Performed by: OBSTETRICS & GYNECOLOGY

## 2022-08-30 PROCEDURE — 88307 TISSUE EXAM BY PATHOLOGIST: CPT | Performed by: OBSTETRICS & GYNECOLOGY

## 2022-08-30 PROCEDURE — 88321 CONSLTJ&REPRT SLD PREP ELSWR: CPT | Performed by: OBSTETRICS & GYNECOLOGY

## 2022-08-30 PROCEDURE — 81025 URINE PREGNANCY TEST: CPT

## 2022-08-30 PROCEDURE — 88342 IMHCHEM/IMCYTCHM 1ST ANTB: CPT | Performed by: OBSTETRICS & GYNECOLOGY

## 2022-08-30 PROCEDURE — 0UT97ZZ RESECTION OF UTERUS, VIA NATURAL OR ARTIFICIAL OPENING: ICD-10-PCS | Performed by: OBSTETRICS & GYNECOLOGY

## 2022-08-30 PROCEDURE — 88305 TISSUE EXAM BY PATHOLOGIST: CPT | Performed by: OBSTETRICS & GYNECOLOGY

## 2022-08-30 DEVICE — TRANSVAGINAL MID-URETHRAL SLING
Type: IMPLANTABLE DEVICE | Site: VAGINA | Status: FUNCTIONAL
Brand: ADVANTAGE FIT™ BLUE SYSTEM

## 2022-08-30 RX ORDER — MIDAZOLAM HYDROCHLORIDE 1 MG/ML
INJECTION INTRAMUSCULAR; INTRAVENOUS
Status: COMPLETED
Start: 2022-08-30 | End: 2022-08-30

## 2022-08-30 RX ORDER — LIDOCAINE HYDROCHLORIDE 10 MG/ML
INJECTION, SOLUTION EPIDURAL; INFILTRATION; INTRACAUDAL; PERINEURAL AS NEEDED
Status: DISCONTINUED | OUTPATIENT
Start: 2022-08-30 | End: 2022-08-30 | Stop reason: SURG

## 2022-08-30 RX ORDER — HYDROMORPHONE HYDROCHLORIDE 1 MG/ML
0.8 INJECTION, SOLUTION INTRAMUSCULAR; INTRAVENOUS; SUBCUTANEOUS EVERY 2 HOUR PRN
Status: DISCONTINUED | OUTPATIENT
Start: 2022-08-30 | End: 2022-08-31

## 2022-08-30 RX ORDER — ACETAMINOPHEN 500 MG
1000 TABLET ORAL ONCE AS NEEDED
Status: DISCONTINUED | OUTPATIENT
Start: 2022-08-30 | End: 2022-08-30 | Stop reason: HOSPADM

## 2022-08-30 RX ORDER — METOCLOPRAMIDE HYDROCHLORIDE 5 MG/ML
INJECTION INTRAMUSCULAR; INTRAVENOUS AS NEEDED
Status: DISCONTINUED | OUTPATIENT
Start: 2022-08-30 | End: 2022-08-30 | Stop reason: SURG

## 2022-08-30 RX ORDER — HYDROMORPHONE HYDROCHLORIDE 1 MG/ML
0.4 INJECTION, SOLUTION INTRAMUSCULAR; INTRAVENOUS; SUBCUTANEOUS EVERY 5 MIN PRN
Status: DISCONTINUED | OUTPATIENT
Start: 2022-08-30 | End: 2022-08-30 | Stop reason: HOSPADM

## 2022-08-30 RX ORDER — PROCHLORPERAZINE EDISYLATE 5 MG/ML
5 INJECTION INTRAMUSCULAR; INTRAVENOUS EVERY 8 HOURS PRN
Status: DISCONTINUED | OUTPATIENT
Start: 2022-08-30 | End: 2022-08-30 | Stop reason: HOSPADM

## 2022-08-30 RX ORDER — ONDANSETRON 2 MG/ML
INJECTION INTRAMUSCULAR; INTRAVENOUS AS NEEDED
Status: DISCONTINUED | OUTPATIENT
Start: 2022-08-30 | End: 2022-08-30 | Stop reason: SURG

## 2022-08-30 RX ORDER — ROCURONIUM BROMIDE 10 MG/ML
INJECTION, SOLUTION INTRAVENOUS AS NEEDED
Status: DISCONTINUED | OUTPATIENT
Start: 2022-08-30 | End: 2022-08-30 | Stop reason: SURG

## 2022-08-30 RX ORDER — NALOXONE HYDROCHLORIDE 0.4 MG/ML
80 INJECTION, SOLUTION INTRAMUSCULAR; INTRAVENOUS; SUBCUTANEOUS AS NEEDED
Status: DISCONTINUED | OUTPATIENT
Start: 2022-08-30 | End: 2022-08-30 | Stop reason: HOSPADM

## 2022-08-30 RX ORDER — MIDAZOLAM HYDROCHLORIDE 1 MG/ML
1 INJECTION INTRAMUSCULAR; INTRAVENOUS EVERY 5 MIN PRN
Status: DISCONTINUED | OUTPATIENT
Start: 2022-08-30 | End: 2022-08-30 | Stop reason: HOSPADM

## 2022-08-30 RX ORDER — HYDROMORPHONE HYDROCHLORIDE 1 MG/ML
INJECTION, SOLUTION INTRAMUSCULAR; INTRAVENOUS; SUBCUTANEOUS
Status: COMPLETED
Start: 2022-08-30 | End: 2022-08-30

## 2022-08-30 RX ORDER — MEPERIDINE HYDROCHLORIDE 25 MG/ML
INJECTION INTRAMUSCULAR; INTRAVENOUS; SUBCUTANEOUS
Status: COMPLETED
Start: 2022-08-30 | End: 2022-08-30

## 2022-08-30 RX ORDER — HYDROMORPHONE HYDROCHLORIDE 1 MG/ML
0.4 INJECTION, SOLUTION INTRAMUSCULAR; INTRAVENOUS; SUBCUTANEOUS EVERY 2 HOUR PRN
Status: DISCONTINUED | OUTPATIENT
Start: 2022-08-30 | End: 2022-08-31

## 2022-08-30 RX ORDER — OXYCODONE HYDROCHLORIDE 5 MG/1
5 TABLET ORAL EVERY 4 HOURS PRN
Status: DISCONTINUED | OUTPATIENT
Start: 2022-08-30 | End: 2022-08-31

## 2022-08-30 RX ORDER — SODIUM CHLORIDE, SODIUM LACTATE, POTASSIUM CHLORIDE, CALCIUM CHLORIDE 600; 310; 30; 20 MG/100ML; MG/100ML; MG/100ML; MG/100ML
INJECTION, SOLUTION INTRAVENOUS CONTINUOUS
Status: DISCONTINUED | OUTPATIENT
Start: 2022-08-30 | End: 2022-08-30 | Stop reason: HOSPADM

## 2022-08-30 RX ORDER — LABETALOL HYDROCHLORIDE 5 MG/ML
5 INJECTION, SOLUTION INTRAVENOUS EVERY 5 MIN PRN
Status: DISCONTINUED | OUTPATIENT
Start: 2022-08-30 | End: 2022-08-30 | Stop reason: HOSPADM

## 2022-08-30 RX ORDER — SODIUM CHLORIDE, SODIUM LACTATE, POTASSIUM CHLORIDE, CALCIUM CHLORIDE 600; 310; 30; 20 MG/100ML; MG/100ML; MG/100ML; MG/100ML
INJECTION, SOLUTION INTRAVENOUS CONTINUOUS
Status: DISCONTINUED | OUTPATIENT
Start: 2022-08-30 | End: 2022-08-31

## 2022-08-30 RX ORDER — MEPERIDINE HYDROCHLORIDE 25 MG/ML
12.5 INJECTION INTRAMUSCULAR; INTRAVENOUS; SUBCUTANEOUS AS NEEDED
Status: DISCONTINUED | OUTPATIENT
Start: 2022-08-30 | End: 2022-08-30 | Stop reason: HOSPADM

## 2022-08-30 RX ORDER — HYDROCODONE BITARTRATE AND ACETAMINOPHEN 5; 325 MG/1; MG/1
2 TABLET ORAL ONCE AS NEEDED
Status: DISCONTINUED | OUTPATIENT
Start: 2022-08-30 | End: 2022-08-30 | Stop reason: HOSPADM

## 2022-08-30 RX ORDER — ONDANSETRON 2 MG/ML
4 INJECTION INTRAMUSCULAR; INTRAVENOUS EVERY 6 HOURS PRN
Status: DISCONTINUED | OUTPATIENT
Start: 2022-08-30 | End: 2022-08-30 | Stop reason: HOSPADM

## 2022-08-30 RX ORDER — HYDROMORPHONE HYDROCHLORIDE 1 MG/ML
0.6 INJECTION, SOLUTION INTRAMUSCULAR; INTRAVENOUS; SUBCUTANEOUS EVERY 5 MIN PRN
Status: DISCONTINUED | OUTPATIENT
Start: 2022-08-30 | End: 2022-08-30 | Stop reason: HOSPADM

## 2022-08-30 RX ORDER — BUPIVACAINE HYDROCHLORIDE AND EPINEPHRINE 2.5; 5 MG/ML; UG/ML
INJECTION, SOLUTION EPIDURAL; INFILTRATION; INTRACAUDAL; PERINEURAL AS NEEDED
Status: DISCONTINUED | OUTPATIENT
Start: 2022-08-30 | End: 2022-08-30 | Stop reason: HOSPADM

## 2022-08-30 RX ORDER — ONDANSETRON 4 MG/1
4 TABLET, FILM COATED ORAL EVERY 8 HOURS PRN
Status: DISCONTINUED | OUTPATIENT
Start: 2022-08-30 | End: 2022-08-31

## 2022-08-30 RX ORDER — HYDROCODONE BITARTRATE AND ACETAMINOPHEN 5; 325 MG/1; MG/1
1 TABLET ORAL ONCE AS NEEDED
Status: DISCONTINUED | OUTPATIENT
Start: 2022-08-30 | End: 2022-08-30 | Stop reason: HOSPADM

## 2022-08-30 RX ORDER — DIPHENHYDRAMINE HYDROCHLORIDE 50 MG/ML
12.5 INJECTION INTRAMUSCULAR; INTRAVENOUS EVERY 4 HOURS PRN
Status: DISCONTINUED | OUTPATIENT
Start: 2022-08-30 | End: 2022-08-31

## 2022-08-30 RX ORDER — FEXOFENADINE HCL AND PSEUDOEPHEDRINE HCI 60; 120 MG/1; MG/1
TABLET, EXTENDED RELEASE ORAL
COMMUNITY
Start: 2022-08-23

## 2022-08-30 RX ORDER — CEFAZOLIN SODIUM/WATER 2 G/20 ML
2 SYRINGE (ML) INTRAVENOUS ONCE
Status: COMPLETED | OUTPATIENT
Start: 2022-08-30 | End: 2022-08-30

## 2022-08-30 RX ORDER — ENOXAPARIN SODIUM 100 MG/ML
40 INJECTION SUBCUTANEOUS DAILY
Status: DISCONTINUED | OUTPATIENT
Start: 2022-08-31 | End: 2022-08-31

## 2022-08-30 RX ORDER — ACETAMINOPHEN 500 MG
1000 TABLET ORAL ONCE
Status: DISCONTINUED | OUTPATIENT
Start: 2022-08-30 | End: 2022-08-30 | Stop reason: HOSPADM

## 2022-08-30 RX ORDER — KETOROLAC TROMETHAMINE 30 MG/ML
30 INJECTION, SOLUTION INTRAMUSCULAR; INTRAVENOUS EVERY 6 HOURS
Status: COMPLETED | OUTPATIENT
Start: 2022-08-30 | End: 2022-08-31

## 2022-08-30 RX ORDER — IBUPROFEN 600 MG/1
600 TABLET ORAL EVERY 6 HOURS SCHEDULED
Status: DISCONTINUED | OUTPATIENT
Start: 2022-08-31 | End: 2022-08-31

## 2022-08-30 RX ORDER — KETOROLAC TROMETHAMINE 30 MG/ML
INJECTION, SOLUTION INTRAMUSCULAR; INTRAVENOUS AS NEEDED
Status: DISCONTINUED | OUTPATIENT
Start: 2022-08-30 | End: 2022-08-30 | Stop reason: SURG

## 2022-08-30 RX ORDER — PHENYLEPHRINE HCL 10 MG/ML
VIAL (ML) INJECTION AS NEEDED
Status: DISCONTINUED | OUTPATIENT
Start: 2022-08-30 | End: 2022-08-30 | Stop reason: SURG

## 2022-08-30 RX ORDER — ONDANSETRON 2 MG/ML
4 INJECTION INTRAMUSCULAR; INTRAVENOUS EVERY 8 HOURS PRN
Status: DISCONTINUED | OUTPATIENT
Start: 2022-08-30 | End: 2022-08-31

## 2022-08-30 RX ORDER — DEXAMETHASONE SODIUM PHOSPHATE 4 MG/ML
VIAL (ML) INJECTION AS NEEDED
Status: DISCONTINUED | OUTPATIENT
Start: 2022-08-30 | End: 2022-08-30 | Stop reason: SURG

## 2022-08-30 RX ORDER — HYDROMORPHONE HYDROCHLORIDE 1 MG/ML
0.2 INJECTION, SOLUTION INTRAMUSCULAR; INTRAVENOUS; SUBCUTANEOUS EVERY 5 MIN PRN
Status: DISCONTINUED | OUTPATIENT
Start: 2022-08-30 | End: 2022-08-30 | Stop reason: HOSPADM

## 2022-08-30 RX ORDER — MIDAZOLAM HYDROCHLORIDE 1 MG/ML
INJECTION INTRAMUSCULAR; INTRAVENOUS AS NEEDED
Status: DISCONTINUED | OUTPATIENT
Start: 2022-08-30 | End: 2022-08-30 | Stop reason: SURG

## 2022-08-30 RX ORDER — OXYCODONE HYDROCHLORIDE 10 MG/1
10 TABLET ORAL EVERY 4 HOURS PRN
Status: DISCONTINUED | OUTPATIENT
Start: 2022-08-30 | End: 2022-08-31

## 2022-08-30 RX ADMIN — ROCURONIUM BROMIDE 30 MG: 10 INJECTION, SOLUTION INTRAVENOUS at 13:30:00

## 2022-08-30 RX ADMIN — KETOROLAC TROMETHAMINE 30 MG: 30 INJECTION, SOLUTION INTRAMUSCULAR; INTRAVENOUS at 15:25:00

## 2022-08-30 RX ADMIN — LIDOCAINE HYDROCHLORIDE 50 MG: 10 INJECTION, SOLUTION EPIDURAL; INFILTRATION; INTRACAUDAL; PERINEURAL at 13:20:00

## 2022-08-30 RX ADMIN — METOCLOPRAMIDE HYDROCHLORIDE 10 MG: 5 INJECTION INTRAMUSCULAR; INTRAVENOUS at 13:20:00

## 2022-08-30 RX ADMIN — ROCURONIUM BROMIDE 10 MG: 10 INJECTION, SOLUTION INTRAVENOUS at 13:20:00

## 2022-08-30 RX ADMIN — CEFAZOLIN SODIUM/WATER 2 G: 2 G/20 ML SYRINGE (ML) INTRAVENOUS at 13:30:00

## 2022-08-30 RX ADMIN — MIDAZOLAM HYDROCHLORIDE 2 MG: 1 INJECTION INTRAMUSCULAR; INTRAVENOUS at 13:15:00

## 2022-08-30 RX ADMIN — ONDANSETRON 4 MG: 2 INJECTION INTRAMUSCULAR; INTRAVENOUS at 15:25:00

## 2022-08-30 RX ADMIN — SODIUM CHLORIDE, SODIUM LACTATE, POTASSIUM CHLORIDE, CALCIUM CHLORIDE: 600; 310; 30; 20 INJECTION, SOLUTION INTRAVENOUS at 13:15:00

## 2022-08-30 RX ADMIN — DEXAMETHASONE SODIUM PHOSPHATE 4 MG: 4 MG/ML VIAL (ML) INJECTION at 13:20:00

## 2022-08-30 RX ADMIN — ROCURONIUM BROMIDE 10 MG: 10 INJECTION, SOLUTION INTRAVENOUS at 13:45:00

## 2022-08-30 RX ADMIN — PHENYLEPHRINE HCL 100 MCG: 10 MG/ML VIAL (ML) INJECTION at 13:20:00

## 2022-08-30 NOTE — INTERVAL H&P NOTE
Pre-op Diagnosis: STRESS INCONTINENCE, UTEROVAGINAL PROLAPSE, RECTOCELE    The above referenced H&P was reviewed by Jasvir Siegel MD on 8/30/2022, the patient was examined and no significant changes have occurred in the patient's condition since the H&P was performed. I discussed with the patient and/or legal representative the potential benefits, risks and side effects of this procedure; the likelihood of the patient achieving goals; and potential problems that might occur during recuperation. I discussed reasonable alternatives to the procedure, including risks, benefits and side effects related to the alternatives and risks related to not receiving this procedure. We will proceed with procedure as planned.

## 2022-08-30 NOTE — BRIEF OP NOTE
Pre-Operative Diagnosis: STRESS INCONTINENCE, UTEROVAGINAL PROLAPSE, CYSTOCELE, RECTOCELE     Post-Operative Diagnosis: STRESS INCONTINENCE, UTEROVAGINAL PROLAPSE, CYSTOCELE, RECTOCELE      Procedure Performed:   TOTAL VAGINAL HYSTERECTOMY, BILATERAL SALPINGECTOMY, UTEROSACRAL LIGAMENT SUSPENSION, ANTERIOR, POSTERIOR, ENTEROCELE REPAIR, PLACEMENT OF MID-URETHRAL SLING, CYSTOSCOPY    Surgeon(s) and Role:     Cristina Joseph MD - Primary    Assistant(s):  Surgical Assistant.: Katelyn TREVIZO     Surgical Findings: Stage 2 uterovaginal prolapse, cystocele, rectocele. Fibroid uterus.       Specimen: Uterus, cervix, right and left fallopian tubes, vaginal cyst.      Estimated Blood Loss: Blood Output: 200 mL (8/30/2022  3:56 PM)      Jasvir Siegel MD  8/30/2022  4:19 PM

## 2022-08-30 NOTE — PROGRESS NOTES
NURSING ADMISSION NOTE      Patient admitted via Bed  Oriented to room. Safety precautions initiated. Bed in low position. Call light in reach. Patient arrived to floor in stable condition at 1812. Accompanied by . Admission database completed.

## 2022-08-30 NOTE — ANESTHESIA PROCEDURE NOTES
Airway  Date/Time: 8/30/2022 1:21 PM  Urgency: elective    Airway not difficult    General Information and Staff    Patient location during procedure: OR  Anesthesiologist: Candi Morgan MD  Performed: anesthesiologist     Indications and Patient Condition  Indications for airway management: anesthesia  Sedation level: deep  Preoxygenated: yes  Patient position: sniffing  Mask difficulty assessment: 0 - not attempted    Final Airway Details  Final airway type: endotracheal airway      Successful airway: ETT  Cuffed: yes   Successful intubation technique: direct laryngoscopy  Facilitating devices/methods: intubating stylet  Endotracheal tube insertion site: oral  Blade: Sherri  Blade size: #3  ETT size (mm): 7.0    Cormack-Lehane Classification: grade IIA - partial view of glottis  Placement verified by: chest auscultation and capnometry   Measured from: lips  ETT to lips (cm): 21  Number of attempts at approach: 1  Number of other approaches attempted: 0

## 2022-08-31 VITALS
OXYGEN SATURATION: 97 % | DIASTOLIC BLOOD PRESSURE: 65 MMHG | TEMPERATURE: 99 F | RESPIRATION RATE: 18 BRPM | WEIGHT: 148.13 LBS | HEIGHT: 66 IN | BODY MASS INDEX: 23.81 KG/M2 | HEART RATE: 74 BPM | SYSTOLIC BLOOD PRESSURE: 134 MMHG

## 2022-08-31 LAB
ANION GAP SERPL CALC-SCNC: 5 MMOL/L (ref 0–18)
BUN BLD-MCNC: 9 MG/DL (ref 7–18)
CALCIUM BLD-MCNC: 8.3 MG/DL (ref 8.5–10.1)
CHLORIDE SERPL-SCNC: 107 MMOL/L (ref 98–112)
CO2 SERPL-SCNC: 25 MMOL/L (ref 21–32)
CREAT BLD-MCNC: 0.56 MG/DL
ERYTHROCYTE [DISTWIDTH] IN BLOOD BY AUTOMATED COUNT: 11.7 %
GFR SERPLBLD BASED ON 1.73 SQ M-ARVRAT: 109 ML/MIN/1.73M2 (ref 60–?)
GLUCOSE BLD-MCNC: 125 MG/DL (ref 70–99)
HCT VFR BLD AUTO: 35.7 %
HGB BLD-MCNC: 11.8 G/DL
MCH RBC QN AUTO: 30.5 PG (ref 26–34)
MCHC RBC AUTO-ENTMCNC: 33.1 G/DL (ref 31–37)
MCV RBC AUTO: 92.2 FL
OSMOLALITY SERPL CALC.SUM OF ELEC: 284 MOSM/KG (ref 275–295)
PLATELET # BLD AUTO: 229 10(3)UL (ref 150–450)
POTASSIUM SERPL-SCNC: 3.9 MMOL/L (ref 3.5–5.1)
RBC # BLD AUTO: 3.87 X10(6)UL
SODIUM SERPL-SCNC: 137 MMOL/L (ref 136–145)
WBC # BLD AUTO: 14.3 X10(3) UL (ref 4–11)

## 2022-08-31 PROCEDURE — 85027 COMPLETE CBC AUTOMATED: CPT | Performed by: OBSTETRICS & GYNECOLOGY

## 2022-08-31 PROCEDURE — 80048 BASIC METABOLIC PNL TOTAL CA: CPT | Performed by: OBSTETRICS & GYNECOLOGY

## 2022-08-31 RX ORDER — IBUPROFEN 600 MG/1
600 TABLET ORAL EVERY 6 HOURS PRN
Qty: 30 TABLET | Refills: 1 | Status: SHIPPED | OUTPATIENT
Start: 2022-08-31

## 2022-08-31 RX ORDER — HYDROCODONE BITARTRATE AND ACETAMINOPHEN 5; 325 MG/1; MG/1
1-2 TABLET ORAL EVERY 6 HOURS PRN
Qty: 20 TABLET | Refills: 0 | Status: SHIPPED | OUTPATIENT
Start: 2022-08-31

## 2022-09-02 ENCOUNTER — PATIENT OUTREACH (OUTPATIENT)
Dept: CASE MANAGEMENT | Age: 52
End: 2022-09-02

## 2022-09-02 NOTE — PROGRESS NOTES
VM received; pt requesting assistance w/scheduling apt (dc 08/31)    Dr Aleida Romero [facility]  [facility] [address] [place] [address] Road [phone]  Follow up 09/06 for catheter removal & 6 weeks post op

## 2022-09-06 ENCOUNTER — OFFICE VISIT (OUTPATIENT)
Dept: UROLOGY | Facility: CLINIC | Age: 52
End: 2022-09-06
Attending: OBSTETRICS & GYNECOLOGY
Payer: COMMERCIAL

## 2022-09-06 VITALS — BODY MASS INDEX: 23.78 KG/M2 | HEIGHT: 66 IN | WEIGHT: 148 LBS | TEMPERATURE: 98 F

## 2022-09-06 DIAGNOSIS — R33.8 POSTOPERATIVE URINARY RETENTION: Primary | ICD-10-CM

## 2022-09-06 DIAGNOSIS — N99.89 POSTOPERATIVE URINARY RETENTION: Primary | ICD-10-CM

## 2022-09-06 PROCEDURE — 99212 OFFICE O/P EST SF 10 MIN: CPT

## 2022-09-06 NOTE — PROCEDURES
Patient here for voiding trial.   Procedure Date: 8/30/22  Procedure Name: Anterior/Posterior/Enterocele Repair, Bilateral Salpingectomy, Cystoscopy, Mid-urethral Sling, Uterosacral Ligament Suspension and Vaginal Hysterectomy  Doing well no complaints  BMs regular  Using Benefiber and Miralax for bowel mgmt  Pain  Using Ibuprofen and Norco for pain mgmt  Tolerates ambulation & diet     Gen: NAD  Pulm:nl effort  : No active bleeding. Discharge +, Surgical sites-WNL, Bautista intact and draining clear yellow urine. Disconnected bautista catheter from drainage tubing. Using a 60 cc Daniella syringe, 150 ml sterile water was instilled per gravity, balloon deflated using 10 cc syringe, and catheter removed. Pt up to bathroom and voided 200 mL. Patient passes voiding trial.  Patient  tolerated procedure well. Reviewed post op restrictions and bowel management  Reviewed signs and sx of urinary retention and UTI. Discussed return to work/activity plans  Follow-up with Dr. Antony Arcos in 5 weeks.      All questions answered  She understands and agrees to plan

## 2022-09-06 NOTE — PROGRESS NOTES
Pt voiding well at home, has also had BM today. Feeling well. Does c/o irritation to urethra since catheter removed. Took ibuprofen around 1130 am and now symptoms have subsided. Will cont to monitor and call us back if symptoms return.

## 2022-10-05 ENCOUNTER — OFFICE VISIT (OUTPATIENT)
Dept: UROLOGY | Facility: CLINIC | Age: 52
End: 2022-10-05
Attending: OBSTETRICS & GYNECOLOGY
Payer: COMMERCIAL

## 2022-10-05 VITALS — TEMPERATURE: 98 F | WEIGHT: 148 LBS | HEIGHT: 66 IN | BODY MASS INDEX: 23.78 KG/M2

## 2022-10-05 DIAGNOSIS — Z98.890 POSTOPERATIVE STATE: Primary | ICD-10-CM

## 2022-10-05 PROCEDURE — 99212 OFFICE O/P EST SF 10 MIN: CPT

## 2022-10-05 RX ORDER — POLYETHYLENE GLYCOL 3350 17 G/17G
17 POWDER, FOR SOLUTION ORAL DAILY
COMMUNITY

## 2022-12-16 LAB — AMB EXT COVID-19 RESULT: DETECTED

## 2022-12-19 ENCOUNTER — OFFICE VISIT (OUTPATIENT)
Dept: FAMILY MEDICINE CLINIC | Facility: CLINIC | Age: 52
End: 2022-12-19
Payer: COMMERCIAL

## 2022-12-19 VITALS
WEIGHT: 145 LBS | HEART RATE: 82 BPM | TEMPERATURE: 98 F | OXYGEN SATURATION: 100 % | BODY MASS INDEX: 23.3 KG/M2 | SYSTOLIC BLOOD PRESSURE: 142 MMHG | RESPIRATION RATE: 18 BRPM | DIASTOLIC BLOOD PRESSURE: 80 MMHG | HEIGHT: 66 IN

## 2022-12-19 DIAGNOSIS — U07.1 POSITIVE SELF-ADMINISTERED ANTIGEN TEST FOR COVID-19: Primary | ICD-10-CM

## 2022-12-19 PROCEDURE — 99213 OFFICE O/P EST LOW 20 MIN: CPT | Performed by: PHYSICIAN ASSISTANT

## 2022-12-19 PROCEDURE — 3077F SYST BP >= 140 MM HG: CPT | Performed by: PHYSICIAN ASSISTANT

## 2022-12-19 PROCEDURE — 3079F DIAST BP 80-89 MM HG: CPT | Performed by: PHYSICIAN ASSISTANT

## 2022-12-19 PROCEDURE — 3008F BODY MASS INDEX DOCD: CPT | Performed by: PHYSICIAN ASSISTANT

## 2022-12-19 NOTE — PATIENT INSTRUCTIONS
Flonase  Motrin  OTC symptom support  Follow up with PCP   Isolation discussed  Emergent signs and symptoms discussed

## 2022-12-27 ENCOUNTER — OFFICE VISIT (OUTPATIENT)
Dept: FAMILY MEDICINE CLINIC | Facility: CLINIC | Age: 52
End: 2022-12-27
Payer: COMMERCIAL

## 2022-12-27 VITALS
OXYGEN SATURATION: 98 % | RESPIRATION RATE: 18 BRPM | WEIGHT: 145 LBS | SYSTOLIC BLOOD PRESSURE: 138 MMHG | DIASTOLIC BLOOD PRESSURE: 85 MMHG | BODY MASS INDEX: 23.3 KG/M2 | HEIGHT: 66 IN | HEART RATE: 72 BPM | TEMPERATURE: 98 F

## 2022-12-27 DIAGNOSIS — J02.9 SORE THROAT: Primary | ICD-10-CM

## 2022-12-27 DIAGNOSIS — R09.82 POST-NASAL DRIP: ICD-10-CM

## 2022-12-27 DIAGNOSIS — H69.83 DYSFUNCTION OF BOTH EUSTACHIAN TUBES: ICD-10-CM

## 2022-12-27 LAB
CONTROL LINE PRESENT WITH A CLEAR BACKGROUND (YES/NO): YES YES/NO
STREP GRP A CUL-SCR: NEGATIVE

## 2022-12-27 PROCEDURE — 87880 STREP A ASSAY W/OPTIC: CPT | Performed by: NURSE PRACTITIONER

## 2022-12-27 PROCEDURE — 3075F SYST BP GE 130 - 139MM HG: CPT | Performed by: NURSE PRACTITIONER

## 2022-12-27 PROCEDURE — 3008F BODY MASS INDEX DOCD: CPT | Performed by: NURSE PRACTITIONER

## 2022-12-27 PROCEDURE — 3079F DIAST BP 80-89 MM HG: CPT | Performed by: NURSE PRACTITIONER

## 2022-12-27 PROCEDURE — 99213 OFFICE O/P EST LOW 20 MIN: CPT | Performed by: NURSE PRACTITIONER

## 2023-01-02 ENCOUNTER — HOSPITAL ENCOUNTER (EMERGENCY)
Age: 53
Discharge: HOME | End: 2023-01-02
Attending: EMERGENCY MEDICINE
Payer: COMMERCIAL

## 2023-01-02 ENCOUNTER — APPOINTMENT (OUTPATIENT)
Dept: CT IMAGING | Age: 53
End: 2023-01-02
Attending: PHYSICIAN ASSISTANT
Payer: COMMERCIAL

## 2023-01-02 VITALS
HEART RATE: 78 BPM | SYSTOLIC BLOOD PRESSURE: 137 MMHG | WEIGHT: 145 LBS | OXYGEN SATURATION: 95 % | HEIGHT: 66 IN | RESPIRATION RATE: 16 BRPM | DIASTOLIC BLOOD PRESSURE: 77 MMHG | BODY MASS INDEX: 23.3 KG/M2 | TEMPERATURE: 97 F

## 2023-01-02 DIAGNOSIS — R51.9 HEADACHE IN BACK OF HEAD: Primary | ICD-10-CM

## 2023-01-02 DIAGNOSIS — J01.00 ACUTE NON-RECURRENT MAXILLARY SINUSITIS: ICD-10-CM

## 2023-01-02 PROCEDURE — 99284 EMERGENCY DEPT VISIT MOD MDM: CPT

## 2023-01-02 PROCEDURE — 96361 HYDRATE IV INFUSION ADD-ON: CPT

## 2023-01-02 PROCEDURE — 70450 CT HEAD/BRAIN W/O DYE: CPT | Performed by: PHYSICIAN ASSISTANT

## 2023-01-02 PROCEDURE — 96374 THER/PROPH/DIAG INJ IV PUSH: CPT

## 2023-01-02 PROCEDURE — 96375 TX/PRO/DX INJ NEW DRUG ADDON: CPT

## 2023-01-02 RX ORDER — AMOXICILLIN AND CLAVULANATE POTASSIUM 875; 125 MG/1; MG/1
1 TABLET, FILM COATED ORAL 2 TIMES DAILY
Qty: 14 TABLET | Refills: 0 | Status: SHIPPED | OUTPATIENT
Start: 2023-01-02 | End: 2023-01-09

## 2023-01-02 RX ORDER — METOCLOPRAMIDE HYDROCHLORIDE 5 MG/ML
10 INJECTION INTRAMUSCULAR; INTRAVENOUS ONCE
Status: COMPLETED | OUTPATIENT
Start: 2023-01-02 | End: 2023-01-02

## 2023-01-02 RX ORDER — DIPHENHYDRAMINE HYDROCHLORIDE 50 MG/ML
25 INJECTION INTRAMUSCULAR; INTRAVENOUS ONCE
Status: COMPLETED | OUTPATIENT
Start: 2023-01-02 | End: 2023-01-02

## 2023-01-02 RX ORDER — KETOROLAC TROMETHAMINE 15 MG/ML
15 INJECTION, SOLUTION INTRAMUSCULAR; INTRAVENOUS ONCE
Status: COMPLETED | OUTPATIENT
Start: 2023-01-02 | End: 2023-01-02

## 2023-01-02 NOTE — DISCHARGE INSTRUCTIONS
Take the antibiotic as prescribed. Tylenol or ibuprofen as needed for pain or headache. Follow up with your primary doctor. If you have new, changing or worsening symptoms, please go directly to the ER.

## 2023-01-12 ENCOUNTER — LAB ENCOUNTER (OUTPATIENT)
Dept: LAB | Age: 53
End: 2023-01-12
Attending: INTERNAL MEDICINE
Payer: COMMERCIAL

## 2023-01-12 DIAGNOSIS — R07.2 PRECORDIAL PAIN: ICD-10-CM

## 2023-01-12 DIAGNOSIS — I47.1 PAROXYSMAL SUPRAVENTRICULAR TACHYCARDIA (HCC): Primary | ICD-10-CM

## 2023-01-12 DIAGNOSIS — E78.5 HYPERLIPIDEMIA: ICD-10-CM

## 2023-01-12 LAB
ALBUMIN SERPL-MCNC: 3.5 G/DL (ref 3.4–5)
ALBUMIN/GLOB SERPL: 0.9 {RATIO} (ref 1–2)
ALP LIVER SERPL-CCNC: 69 U/L
ALT SERPL-CCNC: 23 U/L
ANION GAP SERPL CALC-SCNC: 4 MMOL/L (ref 0–18)
AST SERPL-CCNC: 17 U/L (ref 15–37)
BASOPHILS # BLD AUTO: 0.06 X10(3) UL (ref 0–0.2)
BASOPHILS NFR BLD AUTO: 1 %
BILIRUB SERPL-MCNC: 0.6 MG/DL (ref 0.1–2)
BUN BLD-MCNC: 15 MG/DL (ref 7–18)
CALCIUM BLD-MCNC: 8.9 MG/DL (ref 8.5–10.1)
CHLORIDE SERPL-SCNC: 108 MMOL/L (ref 98–112)
CHOLEST SERPL-MCNC: 154 MG/DL (ref ?–200)
CO2 SERPL-SCNC: 27 MMOL/L (ref 21–32)
CREAT BLD-MCNC: 0.81 MG/DL
EOSINOPHIL # BLD AUTO: 0.14 X10(3) UL (ref 0–0.7)
EOSINOPHIL NFR BLD AUTO: 2.4 %
ERYTHROCYTE [DISTWIDTH] IN BLOOD BY AUTOMATED COUNT: 11.9 %
FASTING PATIENT LIPID ANSWER: YES
FASTING STATUS PATIENT QL REPORTED: YES
GFR SERPLBLD BASED ON 1.73 SQ M-ARVRAT: 86 ML/MIN/1.73M2 (ref 60–?)
GLOBULIN PLAS-MCNC: 3.8 G/DL (ref 2.8–4.4)
GLUCOSE BLD-MCNC: 113 MG/DL (ref 70–99)
HCT VFR BLD AUTO: 41 %
HDLC SERPL-MCNC: 64 MG/DL (ref 40–59)
HGB BLD-MCNC: 13.1 G/DL
IMM GRANULOCYTES # BLD AUTO: 0.02 X10(3) UL (ref 0–1)
IMM GRANULOCYTES NFR BLD: 0.3 %
LDLC SERPL CALC-MCNC: 77 MG/DL (ref ?–100)
LYMPHOCYTES # BLD AUTO: 1.46 X10(3) UL (ref 1–4)
LYMPHOCYTES NFR BLD AUTO: 24.9 %
MCH RBC QN AUTO: 29 PG (ref 26–34)
MCHC RBC AUTO-ENTMCNC: 32 G/DL (ref 31–37)
MCV RBC AUTO: 90.7 FL
MONOCYTES # BLD AUTO: 0.4 X10(3) UL (ref 0.1–1)
MONOCYTES NFR BLD AUTO: 6.8 %
NEUTROPHILS # BLD AUTO: 3.79 X10 (3) UL (ref 1.5–7.7)
NEUTROPHILS # BLD AUTO: 3.79 X10(3) UL (ref 1.5–7.7)
NEUTROPHILS NFR BLD AUTO: 64.6 %
NONHDLC SERPL-MCNC: 90 MG/DL (ref ?–130)
OSMOLALITY SERPL CALC.SUM OF ELEC: 290 MOSM/KG (ref 275–295)
PLATELET # BLD AUTO: 272 10(3)UL (ref 150–450)
POTASSIUM SERPL-SCNC: 3.9 MMOL/L (ref 3.5–5.1)
PROT SERPL-MCNC: 7.3 G/DL (ref 6.4–8.2)
RBC # BLD AUTO: 4.52 X10(6)UL
SODIUM SERPL-SCNC: 139 MMOL/L (ref 136–145)
TRIGL SERPL-MCNC: 62 MG/DL (ref 30–149)
TSI SER-ACNC: 1 MIU/ML (ref 0.36–3.74)
VLDLC SERPL CALC-MCNC: 10 MG/DL (ref 0–30)
WBC # BLD AUTO: 5.9 X10(3) UL (ref 4–11)

## 2023-01-12 PROCEDURE — 80061 LIPID PANEL: CPT

## 2023-01-12 PROCEDURE — 85025 COMPLETE CBC W/AUTO DIFF WBC: CPT

## 2023-01-12 PROCEDURE — 84443 ASSAY THYROID STIM HORMONE: CPT

## 2023-01-12 PROCEDURE — 36415 COLL VENOUS BLD VENIPUNCTURE: CPT

## 2023-01-12 PROCEDURE — 80053 COMPREHEN METABOLIC PANEL: CPT

## 2023-01-18 ENCOUNTER — OFFICE VISIT (OUTPATIENT)
Dept: UROLOGY | Facility: CLINIC | Age: 53
End: 2023-01-18
Attending: OBSTETRICS & GYNECOLOGY
Payer: COMMERCIAL

## 2023-01-18 VITALS — HEIGHT: 66 IN | BODY MASS INDEX: 23.3 KG/M2 | TEMPERATURE: 98 F | WEIGHT: 145 LBS

## 2023-01-18 DIAGNOSIS — Z98.890 POSTOPERATIVE STATE: Primary | ICD-10-CM

## 2023-01-18 DIAGNOSIS — N81.89 PELVIC FLOOR WEAKNESS: ICD-10-CM

## 2023-01-18 PROBLEM — R39.89 BLADDER PAIN: Status: RESOLVED | Noted: 2021-09-01 | Resolved: 2023-01-18

## 2023-01-18 PROBLEM — R35.0 URINARY FREQUENCY: Status: RESOLVED | Noted: 2021-09-01 | Resolved: 2023-01-18

## 2023-01-18 PROBLEM — N39.41 URGENCY INCONTINENCE: Status: RESOLVED | Noted: 2021-07-22 | Resolved: 2023-01-18

## 2023-01-18 PROBLEM — N81.6 RECTOCELE: Status: RESOLVED | Noted: 2021-09-01 | Resolved: 2023-01-18

## 2023-01-18 PROBLEM — N81.11 CYSTOCELE, MIDLINE: Status: RESOLVED | Noted: 2021-09-01 | Resolved: 2023-01-18

## 2023-01-18 PROBLEM — N39.3 SUI (STRESS URINARY INCONTINENCE, FEMALE): Status: RESOLVED | Noted: 2021-07-22 | Resolved: 2023-01-18

## 2023-01-18 PROBLEM — N81.2 INCOMPLETE UTEROVAGINAL PROLAPSE: Status: RESOLVED | Noted: 2021-09-01 | Resolved: 2023-01-18

## 2023-01-18 PROBLEM — R39.15 URGENCY OF MICTURITION: Status: RESOLVED | Noted: 2021-09-01 | Resolved: 2023-01-18

## 2023-01-18 PROBLEM — R35.1 NOCTURIA: Status: RESOLVED | Noted: 2021-07-22 | Resolved: 2023-01-18

## 2023-01-18 PROCEDURE — 99212 OFFICE O/P EST SF 10 MIN: CPT

## 2023-08-04 NOTE — PLAN OF CARE
Patient alert and oriented x4. On room air. Patient Pain managed with PRN pain medications. Scant serosanguineous drainage on jeaninne pad. Ferrara catheter draining clear yellow urine into collection bag. IVF infusing per order. Patient resting in bed. Call light within reach.      Problem: PAIN - ADULT  Goal: Verbalizes/displays adequate comfort level or patient's stated pain goal  Description: INTERVENTIONS:  - Encourage pt to monitor pain and request assistance  - Assess pain using appropriate pain scale  - Administer analgesics based on type and severity of pain and evaluate response  - Implement non-pharmacological measures as appropriate and evaluate response  - Consider cultural and social influences on pain and pain management  - Manage/alleviate anxiety  - Utilize distraction and/or relaxation techniques  - Monitor for opioid side effects  - Notify MD/LIP if interventions unsuccessful or patient reports new pain  - Anticipate increased pain with activity and pre-medicate as appropriate  Outcome: Progressing
Pts IV d/c'd, catheter intact. Ferrara care instructions discussed, pt verbalized understanding. Pt d/c'd via wheelchair with support staff.
independent

## 2023-08-21 ENCOUNTER — TELEPHONE (OUTPATIENT)
Dept: UROLOGY | Facility: CLINIC | Age: 53
End: 2023-08-21

## 2023-08-23 ENCOUNTER — OFFICE VISIT (OUTPATIENT)
Dept: UROLOGY | Facility: CLINIC | Age: 53
End: 2023-08-23
Attending: OBSTETRICS & GYNECOLOGY
Payer: COMMERCIAL

## 2023-08-23 VITALS — TEMPERATURE: 98 F | WEIGHT: 145 LBS | HEIGHT: 66 IN | BODY MASS INDEX: 23.3 KG/M2

## 2023-08-23 DIAGNOSIS — N81.89 PELVIC FLOOR WEAKNESS: Primary | ICD-10-CM

## 2023-08-23 PROCEDURE — 99212 OFFICE O/P EST SF 10 MIN: CPT

## 2023-09-20 ENCOUNTER — LAB ENCOUNTER (OUTPATIENT)
Dept: LAB | Age: 53
End: 2023-09-20
Attending: INTERNAL MEDICINE
Payer: COMMERCIAL

## 2023-09-20 DIAGNOSIS — E78.5 HYPERLIPEMIA: Primary | ICD-10-CM

## 2023-09-20 DIAGNOSIS — K76.89 COMPENSATORY LOBAR HYPERPLASIA OF LIVER: ICD-10-CM

## 2023-09-20 LAB
ALBUMIN SERPL-MCNC: 3.4 G/DL (ref 3.4–5)
ALBUMIN/GLOB SERPL: 0.9 {RATIO} (ref 1–2)
ALP LIVER SERPL-CCNC: 72 U/L
ALT SERPL-CCNC: 26 U/L
ANION GAP SERPL CALC-SCNC: 5 MMOL/L (ref 0–18)
AST SERPL-CCNC: 16 U/L (ref 15–37)
BILIRUB SERPL-MCNC: 0.5 MG/DL (ref 0.1–2)
BUN BLD-MCNC: 15 MG/DL (ref 7–18)
CALCIUM BLD-MCNC: 8.8 MG/DL (ref 8.5–10.1)
CHLORIDE SERPL-SCNC: 108 MMOL/L (ref 98–112)
CHOLEST SERPL-MCNC: 149 MG/DL (ref ?–200)
CO2 SERPL-SCNC: 26 MMOL/L (ref 21–32)
CREAT BLD-MCNC: 0.78 MG/DL
EGFRCR SERPLBLD CKD-EPI 2021: 90 ML/MIN/1.73M2 (ref 60–?)
FASTING PATIENT LIPID ANSWER: YES
FASTING STATUS PATIENT QL REPORTED: YES
GLOBULIN PLAS-MCNC: 3.8 G/DL (ref 2.8–4.4)
GLUCOSE BLD-MCNC: 100 MG/DL (ref 70–99)
HDLC SERPL-MCNC: 61 MG/DL (ref 40–59)
LDLC SERPL CALC-MCNC: 72 MG/DL (ref ?–100)
NONHDLC SERPL-MCNC: 88 MG/DL (ref ?–130)
OSMOLALITY SERPL CALC.SUM OF ELEC: 289 MOSM/KG (ref 275–295)
POTASSIUM SERPL-SCNC: 4 MMOL/L (ref 3.5–5.1)
PROT SERPL-MCNC: 7.2 G/DL (ref 6.4–8.2)
SODIUM SERPL-SCNC: 139 MMOL/L (ref 136–145)
TRIGL SERPL-MCNC: 84 MG/DL (ref 30–149)
TSI SER-ACNC: 1.58 MIU/ML (ref 0.36–3.74)
VLDLC SERPL CALC-MCNC: 13 MG/DL (ref 0–30)

## 2023-09-20 PROCEDURE — 80053 COMPREHEN METABOLIC PANEL: CPT

## 2023-09-20 PROCEDURE — 36415 COLL VENOUS BLD VENIPUNCTURE: CPT

## 2023-09-20 PROCEDURE — 80061 LIPID PANEL: CPT

## 2023-09-20 PROCEDURE — 84443 ASSAY THYROID STIM HORMONE: CPT

## 2023-10-11 NOTE — PATIENT INSTRUCTIONS
Tru Arguelles - Oncology ([facility])  Wound Care    Patient Name:  Kwame Eugene   MRN:  [id number]  Please continue Flonase nasal spray twice daily as directed for the next week or two. Use saline nasal spray (such as Ocean or Simply Saline) 3-4 times daily up the nose to help soothe tissues and remove post nasal drainage  Salt water gargles 3-4 times daily for removing post nasal drainage. Follow up with PCP if symptoms worsen longer than 2 weeks. 

## 2023-11-07 PROBLEM — R94.31 ABNORMAL EKG: Status: ACTIVE | Noted: 2022-08-23

## 2023-11-07 PROBLEM — M75.51 SUBACROMIAL BURSITIS OF RIGHT SHOULDER JOINT: Status: ACTIVE | Noted: 2023-02-27

## 2023-11-07 PROBLEM — F41.1 GENERALIZED ANXIETY DISORDER: Status: ACTIVE | Noted: 2019-01-08

## 2023-11-07 PROBLEM — R00.2 PALPITATIONS: Status: ACTIVE | Noted: 2023-01-23

## 2023-11-07 PROBLEM — K21.9 GASTROESOPHAGEAL REFLUX DISEASE WITHOUT ESOPHAGITIS: Status: ACTIVE | Noted: 2019-04-16

## 2023-11-07 PROBLEM — E78.5 DYSLIPIDEMIA: Status: ACTIVE | Noted: 2022-08-23

## 2023-11-07 PROBLEM — R53.81 MALAISE AND FATIGUE: Status: ACTIVE | Noted: 2019-01-08

## 2023-11-07 PROBLEM — I47.19 ATRIAL TACHYCARDIA (HCC): Status: ACTIVE | Noted: 2019-11-19

## 2023-11-07 PROBLEM — M25.511 RIGHT ANTERIOR SHOULDER PAIN: Status: ACTIVE | Noted: 2023-02-27

## 2023-11-07 PROBLEM — R53.83 MALAISE AND FATIGUE: Status: ACTIVE | Noted: 2019-01-08

## 2023-11-07 PROBLEM — D17.21 LIPOMA OF RIGHT SHOULDER: Status: ACTIVE | Noted: 2023-02-27

## 2023-11-07 PROBLEM — K76.89 LIVER CYST: Status: ACTIVE | Noted: 2022-08-23

## 2023-11-07 PROBLEM — I47.19 ATRIAL TACHYCARDIA: Status: ACTIVE | Noted: 2019-11-19

## 2023-11-10 ENCOUNTER — OFFICE VISIT (OUTPATIENT)
Dept: FAMILY MEDICINE CLINIC | Facility: CLINIC | Age: 53
End: 2023-11-10
Payer: COMMERCIAL

## 2023-11-10 VITALS
TEMPERATURE: 97 F | RESPIRATION RATE: 18 BRPM | SYSTOLIC BLOOD PRESSURE: 126 MMHG | BODY MASS INDEX: 23.63 KG/M2 | HEIGHT: 66 IN | WEIGHT: 147 LBS | OXYGEN SATURATION: 98 % | HEART RATE: 70 BPM | DIASTOLIC BLOOD PRESSURE: 74 MMHG

## 2023-11-10 DIAGNOSIS — Z20.822 EXPOSURE TO COVID-19 VIRUS: Primary | ICD-10-CM

## 2023-11-10 DIAGNOSIS — J06.9 VIRAL URI: ICD-10-CM

## 2023-11-10 LAB
OPERATOR ID: NORMAL
POCT LOT NUMBER: NORMAL
RAPID SARS-COV-2 BY PCR: NOT DETECTED

## 2023-11-10 PROCEDURE — U0002 COVID-19 LAB TEST NON-CDC: HCPCS | Performed by: NURSE PRACTITIONER

## 2023-11-10 PROCEDURE — 87635 SARS-COV-2 COVID-19 AMP PRB: CPT | Performed by: NURSE PRACTITIONER

## 2023-11-10 PROCEDURE — 3078F DIAST BP <80 MM HG: CPT | Performed by: NURSE PRACTITIONER

## 2023-11-10 PROCEDURE — 3008F BODY MASS INDEX DOCD: CPT | Performed by: NURSE PRACTITIONER

## 2023-11-10 PROCEDURE — 3074F SYST BP LT 130 MM HG: CPT | Performed by: NURSE PRACTITIONER

## 2023-11-10 PROCEDURE — 99213 OFFICE O/P EST LOW 20 MIN: CPT | Performed by: NURSE PRACTITIONER

## 2023-11-11 LAB — SARS-COV-2 RNA RESP QL NAA+PROBE: NOT DETECTED

## 2023-12-07 ENCOUNTER — OFFICE VISIT (OUTPATIENT)
Dept: OBGYN CLINIC | Facility: CLINIC | Age: 53
End: 2023-12-07
Payer: COMMERCIAL

## 2023-12-07 VITALS
DIASTOLIC BLOOD PRESSURE: 66 MMHG | WEIGHT: 147.5 LBS | SYSTOLIC BLOOD PRESSURE: 108 MMHG | HEART RATE: 81 BPM | BODY MASS INDEX: 23.7 KG/M2 | HEIGHT: 66 IN

## 2023-12-07 DIAGNOSIS — Z01.419 WELL WOMAN EXAM WITH ROUTINE GYNECOLOGICAL EXAM: Primary | ICD-10-CM

## 2023-12-07 PROCEDURE — 99396 PREV VISIT EST AGE 40-64: CPT | Performed by: OBSTETRICS & GYNECOLOGY

## 2023-12-07 PROCEDURE — 3008F BODY MASS INDEX DOCD: CPT | Performed by: OBSTETRICS & GYNECOLOGY

## 2023-12-07 PROCEDURE — 3074F SYST BP LT 130 MM HG: CPT | Performed by: OBSTETRICS & GYNECOLOGY

## 2023-12-07 PROCEDURE — 3078F DIAST BP <80 MM HG: CPT | Performed by: OBSTETRICS & GYNECOLOGY

## 2023-12-27 PROBLEM — R10.13 ABDOMINAL PAIN, EPIGASTRIC: Status: ACTIVE | Noted: 2023-12-27

## 2023-12-27 PROBLEM — R13.10 DYSPHAGIA: Status: ACTIVE | Noted: 2023-12-27

## 2024-02-22 ENCOUNTER — OFFICE VISIT (OUTPATIENT)
Dept: NEUROLOGY | Facility: CLINIC | Age: 54
End: 2024-02-22
Payer: COMMERCIAL

## 2024-02-22 VITALS
HEART RATE: 77 BPM | WEIGHT: 144 LBS | SYSTOLIC BLOOD PRESSURE: 114 MMHG | RESPIRATION RATE: 16 BRPM | BODY MASS INDEX: 23 KG/M2 | DIASTOLIC BLOOD PRESSURE: 72 MMHG

## 2024-02-22 DIAGNOSIS — R51.9 NONINTRACTABLE HEADACHE, UNSPECIFIED CHRONICITY PATTERN, UNSPECIFIED HEADACHE TYPE: Primary | ICD-10-CM

## 2024-02-22 DIAGNOSIS — R42 DIZZINESS: ICD-10-CM

## 2024-02-22 DIAGNOSIS — R20.2 NUMBNESS AND TINGLING IN BOTH HANDS: ICD-10-CM

## 2024-02-22 DIAGNOSIS — R20.0 NUMBNESS AND TINGLING IN BOTH HANDS: ICD-10-CM

## 2024-02-22 PROCEDURE — 99204 OFFICE O/P NEW MOD 45 MIN: CPT | Performed by: OTHER

## 2024-02-22 NOTE — PROGRESS NOTES
Neurology New History & Physical    CHIEF COMPLAINT / REASON FOR VISIT:    Chief Complaint   Patient presents with    Neurologic Problem    Headache     HA started , and again after having COVID in 2023. HA are mostly in the frontal region.      HISTORY OBTAINED FROM:  Patient and others as above  Data review (see below)    HISTORY OF PRESENT ILLNESS:  Jennifer Freeman is a 53 year old female with long history of headaches for many years, usually excedrin responsive, but worse after 2022, and again worse after Dec 2023.  Since 2022 excedrin has not worked.  She also more recently tried tylenol, allegra D, saline sinus rinses, none were effective.  These are different than past headaches - the current headaches are assoc with sinus congestion, are more frontal.  +p/p/n  No vision or hearing issues.  She cannot sleep due to headaches.  Not diurnal, not positional, not cough/sneeze/valsalva related.  She was having daily headaches all of 2024 and first week of 2024, but since completing abx, no further headaches in past 2 weeks.  Has some dizziness and BUE numbness but these resolved when headaches stopped.  No neck pain.  No weakness.  No autonomic sx, other than bilateral rhinorrhea.    Although she tested negative, she was having URI symptoms in 2023 while exposed to  who was COVID +.  She also had COVID 2022.    DATA REVIEWED:  As documented in the HPI    2024  Head CT unremarkable     Dec 2023  Ob/gyn note    2023  Clinic note ([facility])  COVID PCR unremarkable x2    2023  CMP unremarkable     NEUROLOGICAL FAMILY HISTORY:  Daughter has headaches    PAST MEDICAL HISTORY:  Past Medical History:   Diagnosis Date    Anxiety     when mother was dx and  of pancreatic CA    Back pain     Bloating     Change in hair     Constipation     Essential hypertension     Fibroids     Flatulence/gas pain/belching     Frequent urination     Headache disorder      Heart palpitations     High blood pressure     High cholesterol     Hyperlipidemia     Indigestion     Leaking of urine     Migraines     Nausea     Painful swallowing Christian    Pap smear for cervical cancer screening 2013    a year and half ago    Visual impairment     contacts/glasses    Wears glasses        PAST SURGICAL HISTORY:  Past Surgical History:   Procedure Laterality Date    ANTERIOR REPAIR  08/30/2022    ENTEROCELE REPAIR  08/30/2022    MIDURETHRAL SLING  08/30/2022    POSTERIOR REPAIR  08/30/2022    UTEROSACRAL LIGAMENT SUSPENSION  08/30/2022    VAGINAL HYSTERECTOMY  08/30/2022    with bilateral salpingectomy       SOCIAL HISTORY:  Social History     Socioeconomic History    Marital status:    Tobacco Use    Smoking status: Never    Smokeless tobacco: Never   Vaping Use    Vaping Use: Never used   Substance and Sexual Activity    Alcohol use: Yes     Comment: socially    Drug use: No    Sexual activity: Yes     Partners: Male   Other Topics Concern    Caffeine Concern Yes     Comment: 1 cup coffee per day    Exercise Yes     Comment: 2-3 x week, walking    Seat Belt Yes       ALLERGIES:  Allergies   Allergen Reactions    Epoxy Resins RASH    Other RASH     Patients she recently had allergy skin testing and is allergic Epoxy  - patient states unsure of exposure - but had a rash on face       CURRENT MEDICATIONS:   pantoprazole 40 MG Oral Tab EC 1 tablet Orally Once a day for 30 days      polyethylene glycol, PEG 3350, 17 g Oral Powd Pack Take 17 g by mouth daily.      NATURAL PSYLLIUM FIBER OR Take by mouth.      fexofenadine-pseudoephedrine ER  MG Oral Tablet 12 Hr Take 1 tablet orally once a day at night.      simvastatin 10 MG Oral Tab Take 1 tablet (10 mg total) by mouth nightly.      Metoprolol Succinate ER 25 MG Oral Tablet 24 Hr Take 0.5 tablets (12.5 mg total) by mouth daily.      lisinopril 10 MG Oral Tab Take 1 tablet (10 mg total) by mouth daily.         REVIEW OF  SYSTEMS:  Patient did not have additional neurological, psychiatric, respiratory, cardiovascular, gastrointestinal, or genitourinary symptoms.    PHYSICAL EXAMINATION:  /72   Pulse 77   Resp 16   Wt 144 lb (65.3 kg)   LMP 04/25/2022 (Approximate)   BMI 23.24 kg/m²     Gen: WDWN, in NAD  MSE: AAOx3, nl language, nl attn/conc, nl fund of knowledge  CN: II - PERRL, VFF; Ill, IV, VI - EOMI, no nystagmus; V - nl facial sensation bilaterally; VII - nl facial mvmt bilaterally; VIII - nl hearing bilaterally; IX - nl palate elevation bilaterally; X - nl voice; XI - nl shoulder shrug b/I; XII - nl tongue movement  Motor: 5/5 x4; no drift; normal tone; no abnormal movements  Sensory: nl vibration x4  Coord: nl FTN b/I  Reflex: 2+ bilaterally in upper and lower extremities  Gait: normal gait         ASSESSMENT & PLAN:      ICD-10-CM    1. Nonintractable headache, unspecified chronicity pattern, unspecified headache type  R51.9 MRI BRAIN (CPT=70551)      2. Dizziness  R42 MRI BRAIN (CPT=70551)      3. Numbness and tingling in both hands  R20.0 MRI BRAIN (CPT=70551)    R20.2         Headaches with dizziness and BUE tingling, different than past headaches.  Obtain brain MRI.  Start Magnesium prophylaxis.  There may be a sinus component.    We discussed medication side effects. Patient verbalized understanding and agreement.    Return in about 3 months (around 5/22/2024).       To summarize medical decision making:  Problems:  I addressed at least 1 or more chronic problems with exacerbations or side effects  Data:  Moderate (any 1 category).  I reviewed external notes, reviewed test results, ordered tests, (at least 3 unique of the above)      Cristobal Hooker MD, FAES, FAAN  Board-Certified in Neurology, Epilepsy, and Clinical Neurophysiology  [facility]

## 2024-02-22 NOTE — PATIENT INSTRUCTIONS
Instructions from Dr. Hooker:     Magnesium oxide or Magnesium gluconate 400-500 mg a day should be taken every day, whether you have headache or not, for prevention purposes.  This needs to be taken for a minimum of 6 weeks to be effective.  You will know if it is working when the headaches are half as often, or half as severe.               Refill policies:    Allow 2-3 business days for refills; controlled substances may take longer.  Contact your pharmacy at least 5 days prior to running out of medication and have them send an electronic request or submit request through the “request refill” option in your [employer] account.  Refills are not addressed on weekends; covering physicians do not authorize routine medications on weekends.  No narcotics or controlled substances are refilled after noon on Fridays or by on call physicians.  By law, narcotics must be electronically prescribed.  A 30 day supply with no refills is the maximum allowed.  If your prescription is due for a refill, you may be due for a follow up appointment.  To best provide you care, patients receiving routine medications need to be seen at least once a year.  Patients receiving narcotic/controlled substance medications need to be seen at least once every 3 months.  In the event that your preferred pharmacy does not have the requested medication in stock (e.g. Backordered), it is your responsibility to find another pharmacy that has the requested medication available.  We will gladly send a new prescription to that pharmacy at your request.    Scheduling Tests:    If your physician has ordered radiology tests such as MRI or CT scans, please contact Central Scheduling at [phone] right away to schedule the test.  Once scheduled, the [facility] Centralized Referral Team will work with your insurance carrier to obtain pre-certification or prior authorization.  Depending on your insurance carrier, approval may take 3-10 days.  It is highly recommended  patients assure they have received an authorization before having a test performed.  If test is done without insurance authorization, patient may be responsible for the entire amount billed.      Precertification and Prior Authorizations:  If your physician has recommended that you have a procedure or additional testing performed the [facility] Centralized Referral Team will contact your insurance carrier to obtain pre-certification or prior authorization.    You are strongly encouraged to contact your insurance carrier to verify that your procedure/test has been approved and is a COVERED benefit.  Although the [facility] Centralized Referral Team does its due diligence, the insurance carrier gives the disclaimer that \"Although the procedure is authorized, this does not guarantee payment.\"    Ultimately the patient is responsible for payment.   Thank you for your understanding in this matter.  Paperwork Completion:  If you require FMLA or disability paperwork for your recovery, please make sure to either drop it off or have it faxed to our office at [phone]. Be sure the form has your name and date of birth on it.  The form will be faxed to our Forms Department and they will complete it for you.  There is a 25$ fee for all forms that need to be filled out.  Please be aware there is a 10-14 day turnaround time.  You will need to sign a release of information (LEANNE) form if your paperwork does not come with one.  You may call the Forms Department with any questions at [phone].  Their fax number is [phone].

## 2024-03-05 ENCOUNTER — TELEPHONE (OUTPATIENT)
Dept: OBGYN CLINIC | Facility: CLINIC | Age: 54
End: 2024-03-05

## 2024-03-05 NOTE — TELEPHONE ENCOUNTER
Pt called, states she called central scheduling to schedule for mammogram. Was told no orders in her chart. Per Dr Borjas to have mammogram done. Please add orders and call pt when this is done, thank you. Pt aware it is end of the day already so may not get to submit orders until tomorrow morning. Thank you.

## 2024-03-06 NOTE — TELEPHONE ENCOUNTER
Per chart review- patient is currently scheduled for mammogram that was ordered by another provider.  No order needed at this time.  Confirmed by calling mammography scheduling- no additional order is needed.

## 2024-03-07 ENCOUNTER — LAB ENCOUNTER (OUTPATIENT)
Dept: LAB | Age: 54
End: 2024-03-07
Attending: NURSE PRACTITIONER
Payer: COMMERCIAL

## 2024-03-07 ENCOUNTER — HOSPITAL ENCOUNTER (OUTPATIENT)
Dept: MAMMOGRAPHY | Age: 54
Discharge: HOME | End: 2024-03-07
Attending: NURSE PRACTITIONER
Payer: COMMERCIAL

## 2024-03-07 DIAGNOSIS — R10.13 EPIGASTRIC PAIN: ICD-10-CM

## 2024-03-07 DIAGNOSIS — R10.33 PERIUMBILICAL PAIN: ICD-10-CM

## 2024-03-07 DIAGNOSIS — I47.19 ATRIAL TACHYCARDIA: Primary | ICD-10-CM

## 2024-03-07 DIAGNOSIS — Z12.31 ENCOUNTER FOR SCREENING MAMMOGRAM FOR MALIGNANT NEOPLASM OF BREAST: ICD-10-CM

## 2024-03-07 DIAGNOSIS — R07.9 CHEST PAIN, UNSPECIFIED: ICD-10-CM

## 2024-03-07 DIAGNOSIS — E78.5 DYSLIPIDEMIA: ICD-10-CM

## 2024-03-07 LAB
ALBUMIN SERPL-MCNC: 3.7 G/DL (ref 3.4–5)
ALBUMIN/GLOB SERPL: 1 {RATIO} (ref 1–2)
ALP LIVER SERPL-CCNC: 73 U/L
ALT SERPL-CCNC: 22 U/L
ANION GAP SERPL CALC-SCNC: 4 MMOL/L (ref 0–18)
AST SERPL-CCNC: 18 U/L (ref 15–37)
BASOPHILS # BLD AUTO: 0.04 X10(3) UL (ref 0–0.2)
BASOPHILS NFR BLD AUTO: 0.8 %
BILIRUB DIRECT SERPL-MCNC: 0.1 MG/DL (ref 0–0.2)
BILIRUB SERPL-MCNC: 0.6 MG/DL (ref 0.1–2)
BUN BLD-MCNC: 16 MG/DL (ref 9–23)
CALCIUM BLD-MCNC: 9.6 MG/DL (ref 8.5–10.1)
CHLORIDE SERPL-SCNC: 111 MMOL/L (ref 98–112)
CHOLEST SERPL-MCNC: 143 MG/DL (ref ?–200)
CO2 SERPL-SCNC: 26 MMOL/L (ref 21–32)
CREAT BLD-MCNC: 0.78 MG/DL
CRP SERPL HS-MCNC: 3.73 MG/L (ref ?–3)
EGFRCR SERPLBLD CKD-EPI 2021: 90 ML/MIN/1.73M2 (ref 60–?)
EOSINOPHIL # BLD AUTO: 0.13 X10(3) UL (ref 0–0.7)
EOSINOPHIL NFR BLD AUTO: 2.7 %
ERYTHROCYTE [DISTWIDTH] IN BLOOD BY AUTOMATED COUNT: 11.6 %
EST. AVERAGE GLUCOSE BLD GHB EST-MCNC: 140 MG/DL (ref 68–126)
FASTING PATIENT LIPID ANSWER: YES
FASTING STATUS PATIENT QL REPORTED: YES
GLOBULIN PLAS-MCNC: 3.7 G/DL (ref 2.8–4.4)
GLUCOSE BLD-MCNC: 104 MG/DL (ref 70–99)
HBA1C MFR BLD: 6.5 % (ref ?–5.7)
HCT VFR BLD AUTO: 40.5 %
HDLC SERPL-MCNC: 57 MG/DL (ref 40–59)
HGB BLD-MCNC: 13.7 G/DL
IMM GRANULOCYTES # BLD AUTO: 0.01 X10(3) UL (ref 0–1)
IMM GRANULOCYTES NFR BLD: 0.2 %
LDLC SERPL CALC-MCNC: 71 MG/DL (ref ?–100)
LYMPHOCYTES # BLD AUTO: 1.42 X10(3) UL (ref 1–4)
LYMPHOCYTES NFR BLD AUTO: 29.2 %
MCH RBC QN AUTO: 30.4 PG (ref 26–34)
MCHC RBC AUTO-ENTMCNC: 33.8 G/DL (ref 31–37)
MCV RBC AUTO: 89.8 FL
MONOCYTES # BLD AUTO: 0.38 X10(3) UL (ref 0.1–1)
MONOCYTES NFR BLD AUTO: 7.8 %
NEUTROPHILS # BLD AUTO: 2.88 X10 (3) UL (ref 1.5–7.7)
NEUTROPHILS # BLD AUTO: 2.88 X10(3) UL (ref 1.5–7.7)
NEUTROPHILS NFR BLD AUTO: 59.3 %
NONHDLC SERPL-MCNC: 86 MG/DL (ref ?–130)
OSMOLALITY SERPL CALC.SUM OF ELEC: 293 MOSM/KG (ref 275–295)
PLATELET # BLD AUTO: 257 10(3)UL (ref 150–450)
POTASSIUM SERPL-SCNC: 4.1 MMOL/L (ref 3.5–5.1)
PROT SERPL-MCNC: 7.4 G/DL (ref 6.4–8.2)
RBC # BLD AUTO: 4.51 X10(6)UL
SODIUM SERPL-SCNC: 141 MMOL/L (ref 136–145)
T4 FREE SERPL-MCNC: 1 NG/DL (ref 0.8–1.7)
TRIGL SERPL-MCNC: 75 MG/DL (ref 30–149)
TSI SER-ACNC: 1.15 MIU/ML (ref 0.36–3.74)
VIT D+METAB SERPL-MCNC: 32.6 NG/ML (ref 30–100)
VLDLC SERPL CALC-MCNC: 11 MG/DL (ref 0–30)
WBC # BLD AUTO: 4.9 X10(3) UL (ref 4–11)

## 2024-03-07 PROCEDURE — 82306 VITAMIN D 25 HYDROXY: CPT

## 2024-03-07 PROCEDURE — 85025 COMPLETE CBC W/AUTO DIFF WBC: CPT

## 2024-03-07 PROCEDURE — 77067 SCR MAMMO BI INCL CAD: CPT | Performed by: NURSE PRACTITIONER

## 2024-03-07 PROCEDURE — 84439 ASSAY OF FREE THYROXINE: CPT

## 2024-03-07 PROCEDURE — 82248 BILIRUBIN DIRECT: CPT

## 2024-03-07 PROCEDURE — 83036 HEMOGLOBIN GLYCOSYLATED A1C: CPT

## 2024-03-07 PROCEDURE — 36415 COLL VENOUS BLD VENIPUNCTURE: CPT

## 2024-03-07 PROCEDURE — 86141 C-REACTIVE PROTEIN HS: CPT

## 2024-03-07 PROCEDURE — 80061 LIPID PANEL: CPT

## 2024-03-07 PROCEDURE — 84443 ASSAY THYROID STIM HORMONE: CPT

## 2024-03-07 PROCEDURE — 77063 BREAST TOMOSYNTHESIS BI: CPT | Performed by: NURSE PRACTITIONER

## 2024-03-07 PROCEDURE — 80053 COMPREHEN METABOLIC PANEL: CPT

## 2024-03-13 DIAGNOSIS — Z12.39 ENCOUNTER FOR BREAST CANCER SCREENING USING NON-MAMMOGRAM MODALITY: ICD-10-CM

## 2024-03-13 DIAGNOSIS — R92.30 DENSE BREAST TISSUE ON MAMMOGRAM, UNSPECIFIED TYPE: Primary | ICD-10-CM

## 2024-03-22 ENCOUNTER — HOSPITAL ENCOUNTER (OUTPATIENT)
Dept: MAMMOGRAPHY | Age: 54
Discharge: HOME | End: 2024-03-22
Attending: NURSE PRACTITIONER
Payer: COMMERCIAL

## 2024-03-22 DIAGNOSIS — R92.2 INCONCLUSIVE MAMMOGRAM: ICD-10-CM

## 2024-03-22 PROCEDURE — 77061 BREAST TOMOSYNTHESIS UNI: CPT | Performed by: NURSE PRACTITIONER

## 2024-03-22 PROCEDURE — 77065 DX MAMMO INCL CAD UNI: CPT | Performed by: NURSE PRACTITIONER

## 2024-03-22 NOTE — IMAGING NOTE
This Breast Care RN assisted Dr. Logan with recommendation for a left breast 1 site stereotactic biopsy for calcifications.  Procedure reviewed and all questions answered. Emotional and educational support given. On the day of the biopsy, pt instructed to take Tylenol 1000mg PO, eat a light meal & bring or wear a sports bra.  Post biopsy care also reviewed with pt to include NO lifting more than 5lbs, no exercising or housework (limit upper body movement) for 24-48 hrs post biopsy. Patient denies blood thinners, bleeding disorders, liver disease, and chemo. Pt verbalized understanding. Our breast center schedulers will be calling to schedule an apt that is convenient for pt.

## 2024-03-25 ENCOUNTER — HOSPITAL ENCOUNTER (OUTPATIENT)
Dept: MAMMOGRAPHY | Facility: HOSPITAL | Age: 54
Discharge: HOME | End: 2024-03-25
Attending: NURSE PRACTITIONER
Payer: COMMERCIAL

## 2024-03-25 DIAGNOSIS — R92.1 BREAST CALCIFICATIONS: ICD-10-CM

## 2024-03-25 PROCEDURE — 88305 TISSUE EXAM BY PATHOLOGIST: CPT | Performed by: NURSE PRACTITIONER

## 2024-03-25 PROCEDURE — 19081 BX BREAST 1ST LESION STRTCTC: CPT | Performed by: NURSE PRACTITIONER

## 2024-03-26 ENCOUNTER — HOSPITAL ENCOUNTER (OUTPATIENT)
Dept: ULTRASOUND IMAGING | Age: 54
Discharge: HOME | End: 2024-03-26
Attending: NURSE PRACTITIONER
Payer: COMMERCIAL

## 2024-03-26 DIAGNOSIS — K82.4 GALLBLADDER POLYP: ICD-10-CM

## 2024-03-26 DIAGNOSIS — R10.13 EPIGASTRIC PAIN: ICD-10-CM

## 2024-03-26 DIAGNOSIS — R10.33 PERIUMBILICAL PAIN: ICD-10-CM

## 2024-03-26 PROCEDURE — 76700 US EXAM ABDOM COMPLETE: CPT | Performed by: NURSE PRACTITIONER

## 2024-03-27 NOTE — IMAGING NOTE
1049: Spoke with Jennifer Freeman post stereotactic left breast biopsy,  Introduced myself as one of the breast nurse coordinators at [facility] and informed Ms. Freeman of the purpose of my call.  Name and date of birth verified by patient.    Reinforced post biopsy care and instruction.  Jennifer Freeman denies any issues with biopsy site- bleeding, drainage, redness, tenderness.     Ms. Freeman shared that she was aware of pathology results as below-MyChart notification.    Pathology results and recommendations discussed as follows: benign findings:   Final Diagnosis:   Left breast, 2:00 calcifications, stereotactic biopsy:  -Breast tissue with stromal fibrosis, microcysts, columnar cell alteration and calcifications.   See EMR for complete pathology report    Per Dr. Ranjith Martin-No evidence of malignancy. These findings are concordant. A six-month left breast diagnostic mammogram is recommended     Jennifer Freeman verbalized understanding and agreement to the above.

## 2024-05-15 PROBLEM — E78.2 MIXED HYPERLIPIDEMIA: Status: ACTIVE | Noted: 2024-05-15

## 2024-05-15 PROBLEM — E55.9 VITAMIN D DEFICIENCY: Status: ACTIVE | Noted: 2024-05-15

## 2024-05-15 PROBLEM — E11.9 TYPE 2 DIABETES MELLITUS (HCC): Status: ACTIVE | Noted: 2024-05-15

## 2024-05-28 ENCOUNTER — OFFICE VISIT (OUTPATIENT)
Facility: LOCATION | Age: 54
End: 2024-05-28

## 2024-05-28 VITALS — HEART RATE: 66 BPM | TEMPERATURE: 97 F

## 2024-05-28 DIAGNOSIS — K82.4 GALLBLADDER POLYP: Primary | ICD-10-CM

## 2024-05-28 PROCEDURE — 99204 OFFICE O/P NEW MOD 45 MIN: CPT | Performed by: SURGERY

## 2024-05-28 NOTE — H&P
New Patient Visit Note       Active Problems      1. Gallbladder polyp        Chief Complaint   Chief Complaint   Patient presents with    New Patient     NP- Gallbladder Polyp Consult, ref by [facility], US Abd on 3/26/24- pt reports abdominal pain        History of Present Illness     Patient presents at the request of her primary care physician and gastroenterologist for evaluation of gallbladder polyp.  In 2017 the patient had a gallbladder polyp seen on ultrasound measuring approximately 1.2 cm.  No intervention was suggested then.  The patient continues to have epigastric and right upper quadrant discomfort for which she had EGD showing acid reflux changes.  The patient has been placed on medication with relief.  The patient does experience now left upper quadrant pain for which a CT scan of the abdomen pelvis has been ordered but not completed.  I reviewed ultrasound images from 2017 and the most current ultrasound which confirms presence of gallbladder polyp.    The patient denies fever, chills, chest pain, shortness of breath, dyspnea. The patient also denies hematemesis, melena, or hematochezia. The patient denies change in bowel or bladder habits. There is no complaint of hematuria or dysuria.    I discussed the risk, benefits, alternatives of surgery.  I discussed the anticipated postoperative recovery including dietary, activity, exercise, and lifestyle recommendations.  The patient's questions regarding surgical procedure were answered and the patient voiced understanding of the care plan.    Allergies  Jennifer is allergic to epoxy resins and other.    Past Medical / Surgical / Social / Family History    The past medical and past surgical history have been reviewed by me today.    Past Medical History:    Anxiety    when mother was dx and  of pancreatic CA    Back pain    Bloating    Change in hair    Constipation    Essential hypertension    Fibroids    Flatulence/gas pain/belching    Frequent  urination    Headache disorder    Heart palpitations    High blood pressure    High cholesterol    Hyperlipidemia    Indigestion    Leaking of urine    Migraines    Nausea    Painful swallowing    Pap smear for cervical cancer screening    a year and half ago    Visual impairment    contacts/glasses    Wears glasses     Past Surgical History:   Procedure Laterality Date    Anterior repair  08/30/2022    Enterocele repair  08/30/2022    Hysterectomy      Midurethral sling  08/30/2022    Posterior repair  08/30/2022    Uterosacral ligament suspension  08/30/2022    Vaginal hysterectomy  08/30/2022    with bilateral salpingectomy       The family history and social history have been reviewed by me today.    Family History   Problem Relation Age of Onset    Hypertension Mother     Cancer Mother 38        Uterine, Pancreatic    Colon Polyps Mother     Diabetes Father     Heart Attack Father     Heart Disorder Maternal Grandmother     Heart Disorder Maternal Grandfather     Heart Disorder Paternal Grandmother     Other (Other) Paternal Grandfather     Uterine Cancer Maternal Aunt     Ovarian Cancer Maternal Aunt 45    Uterine Cancer Paternal Aunt     Breast Cancer Paternal Cousin Female 48    Breast Cancer Maternal Cousin Female 38     Social History     Socioeconomic History    Marital status:    Tobacco Use    Smoking status: Never    Smokeless tobacco: Never   Vaping Use    Vaping status: Never Used   Substance and Sexual Activity    Alcohol use: Yes     Comment: socially    Drug use: No    Sexual activity: Yes     Partners: Male   Other Topics Concern    Caffeine Concern Yes     Comment: 1 cup coffee per day    Exercise Yes     Comment: 2-3 x week, walking    Seat Belt Yes        Current Outpatient Medications:     metFORMIN 500 MG Oral Tab, metFORMIN 500 mg tablet, [RxNorm: 240038], Disp: , Rfl:     Meloxicam 7.5 MG Oral Tab, , Disp: , Rfl:     omeprazole 20 MG Oral Capsule Delayed Release, Take one capsule  (20 mg total) by mouth once daily, 30 minutes prior to breakfast., Disp: 90 capsule, Rfl: 3    polyethylene glycol, PEG 3350, 17 g Oral Powd Pack, Take 17 g by mouth daily., Disp: , Rfl:     NATURAL PSYLLIUM FIBER OR, Take by mouth., Disp: , Rfl:     fexofenadine-pseudoephedrine ER  MG Oral Tablet 12 Hr, Take 1 tablet orally once a day at night., Disp: , Rfl:     simvastatin 10 MG Oral Tab, Take 1 tablet (10 mg total) by mouth nightly., Disp: , Rfl:     Metoprolol Succinate ER 25 MG Oral Tablet 24 Hr, Take 0.5 tablets (12.5 mg total) by mouth daily., Disp: , Rfl:     lisinopril 10 MG Oral Tab, Take 1 tablet (10 mg total) by mouth daily., Disp: , Rfl:       Review of Systems  The Review of Systems has been reviewed by me during today.  Review of Systems   Constitutional:  Negative for chills, diaphoresis, fatigue, fever and unexpected weight change.   HENT:  Negative for hearing loss, nosebleeds, sore throat and trouble swallowing.    Respiratory:  Negative for apnea, cough, shortness of breath and wheezing.    Cardiovascular:  Negative for chest pain, palpitations and leg swelling.   Gastrointestinal:  Negative for abdominal distention, abdominal pain, anal bleeding, blood in stool, constipation, diarrhea, nausea and vomiting.   Genitourinary:  Negative for difficulty urinating, dysuria, frequency and urgency.   Musculoskeletal:  Negative for arthralgias and myalgias.   Skin:  Negative for color change and rash.   Neurological:  Negative for tremors, syncope and weakness.   Hematological:  Negative for adenopathy. Does not bruise/bleed easily.   Psychiatric/Behavioral:  Negative for behavioral problems and sleep disturbance.        Physical Findings   Pulse 66   Temp 97.3 °F (36.3 °C) (Temporal)   LMP 04/25/2022 (Approximate)   Physical Exam  Vitals and nursing note reviewed.   Constitutional:       General: She is not in acute distress.     Appearance: Normal appearance. She is well-developed.   HENT:       Head: Normocephalic and atraumatic.   Eyes:      General: No scleral icterus.     Conjunctiva/sclera: Conjunctivae normal.   Neck:      Trachea: No tracheal deviation.   Cardiovascular:      Rate and Rhythm: Normal rate and regular rhythm.      Heart sounds: S1 normal and S2 normal. No murmur heard.  Pulmonary:      Effort: No accessory muscle usage or respiratory distress.      Breath sounds: No decreased breath sounds, wheezing, rhonchi or rales.   Abdominal:      General: There is no distension or abdominal bruit.      Palpations: Abdomen is soft. Abdomen is not rigid. There is no shifting dullness, fluid wave, hepatomegaly, splenomegaly, mass or pulsatile mass.      Tenderness: There is no abdominal tenderness. There is no guarding or rebound. Negative signs include Karimi's sign and McBurney's sign.      Hernia: No hernia is present. There is no hernia in the umbilical area or ventral area.   Skin:     General: Skin is warm and dry.   Neurological:      Mental Status: She is alert and oriented to person, place, and time.   Psychiatric:         Speech: Speech normal.         Behavior: Behavior normal.         Thought Content: Thought content normal.         Judgment: Judgment normal.             Assessment   1. Gallbladder polyp          Plan     The patient will be scheduled for laparoscopic cholecystectomy with ICG cholangiogram.  Patient's gallbladder polyp meets size criteria for removal.    With respect to her left upper quadrant pain I reinforced to the patient she should complete the CT scan which has been ordered and also follow-up with her GI team to determine further diagnostic workup and intervention as needed.    The jeannine-operative care plan was discussed with the patient, who voices understanding.  Activity and lifting recommendations were discussed in length.     The risks, benefits, and alternatives to the procedure were explained to the patient.  The risks explained include, but are not limited  to, bleeding, infection, pain wound complications, recurrence, incorrect diagnosis, injury to adjacent organs and structures. We also discussed the possibile need for further therapeutic, diagnostic, or surgical intervention.  The patient voiced understanding, and after all questions were answered to the patient's satisfaction, the patient provided willing and informed consent to proceed.     No orders of the defined types were placed in this encounter.      Imaging & Referrals   None    Follow Up  No follow-ups on file.    Brant Soria MD

## 2024-07-23 RX ORDER — GARLIC EXTRACT 500 MG
1 CAPSULE ORAL DAILY
COMMUNITY

## 2024-07-24 ENCOUNTER — TELEPHONE (OUTPATIENT)
Facility: LOCATION | Age: 54
End: 2024-07-24

## 2024-07-24 NOTE — TELEPHONE ENCOUNTER
No prior authorization required for cpt code 25676. Ref #: [id number]. Spoke with automated services.

## 2024-07-25 ENCOUNTER — NURSE ONLY (OUTPATIENT)
Dept: LAB | Age: 54
End: 2024-07-25
Payer: COMMERCIAL

## 2024-07-25 ENCOUNTER — LABORATORY ENCOUNTER (OUTPATIENT)
Dept: LAB | Age: 54
End: 2024-07-25
Payer: COMMERCIAL

## 2024-07-25 DIAGNOSIS — I10 HYPERTENSION, UNSPECIFIED TYPE: ICD-10-CM

## 2024-07-25 DIAGNOSIS — Z01.818 PRE-OP TESTING: ICD-10-CM

## 2024-07-25 DIAGNOSIS — K82.4 GALLBLADDER POLYP: ICD-10-CM

## 2024-07-25 LAB
ALBUMIN SERPL-MCNC: 4.2 G/DL (ref 3.2–4.8)
ALBUMIN/GLOB SERPL: 1.6 {RATIO} (ref 1–2)
ALP LIVER SERPL-CCNC: 64 U/L
ALT SERPL-CCNC: 24 U/L
ANION GAP SERPL CALC-SCNC: 7 MMOL/L (ref 0–18)
AST SERPL-CCNC: 21 U/L (ref ?–34)
ATRIAL RATE: 71 BPM
BILIRUB SERPL-MCNC: 0.7 MG/DL (ref 0.3–1.2)
BUN BLD-MCNC: 14 MG/DL (ref 9–23)
CALCIUM BLD-MCNC: 9.3 MG/DL (ref 8.7–10.4)
CHLORIDE SERPL-SCNC: 108 MMOL/L (ref 98–112)
CO2 SERPL-SCNC: 24 MMOL/L (ref 21–32)
CREAT BLD-MCNC: 0.78 MG/DL
EGFRCR SERPLBLD CKD-EPI 2021: 90 ML/MIN/1.73M2 (ref 60–?)
ERYTHROCYTE [DISTWIDTH] IN BLOOD BY AUTOMATED COUNT: 12.3 %
FASTING STATUS PATIENT QL REPORTED: YES
GLOBULIN PLAS-MCNC: 2.7 G/DL (ref 2–3.5)
GLUCOSE BLD-MCNC: 98 MG/DL (ref 70–99)
HCT VFR BLD AUTO: 37.9 %
HGB BLD-MCNC: 12.4 G/DL
MCH RBC QN AUTO: 30.2 PG (ref 26–34)
MCHC RBC AUTO-ENTMCNC: 32.7 G/DL (ref 31–37)
MCV RBC AUTO: 92.2 FL
OSMOLALITY SERPL CALC.SUM OF ELEC: 288 MOSM/KG (ref 275–295)
P AXIS: 57 DEGREES
P-R INTERVAL: 156 MS
PLATELET # BLD AUTO: 306 10(3)UL (ref 150–450)
POTASSIUM SERPL-SCNC: 3.9 MMOL/L (ref 3.5–5.1)
PROT SERPL-MCNC: 6.9 G/DL (ref 5.7–8.2)
Q-T INTERVAL: 416 MS
QRS DURATION: 76 MS
QTC CALCULATION (BEZET): 452 MS
R AXIS: 10 DEGREES
RBC # BLD AUTO: 4.11 X10(6)UL
SODIUM SERPL-SCNC: 139 MMOL/L (ref 136–145)
T AXIS: 48 DEGREES
VENTRICULAR RATE: 71 BPM
WBC # BLD AUTO: 4.4 X10(3) UL (ref 4–11)

## 2024-07-25 PROCEDURE — 80053 COMPREHEN METABOLIC PANEL: CPT

## 2024-07-25 PROCEDURE — 93010 ELECTROCARDIOGRAM REPORT: CPT | Performed by: INTERNAL MEDICINE

## 2024-07-25 PROCEDURE — 93005 ELECTROCARDIOGRAM TRACING: CPT

## 2024-07-25 PROCEDURE — 36415 COLL VENOUS BLD VENIPUNCTURE: CPT

## 2024-07-25 PROCEDURE — 85027 COMPLETE CBC AUTOMATED: CPT

## 2024-08-01 ENCOUNTER — ANESTHESIA EVENT (OUTPATIENT)
Dept: SURGERY | Facility: HOSPITAL | Age: 54
End: 2024-08-01
Payer: COMMERCIAL

## 2024-08-07 ENCOUNTER — TELEPHONE (OUTPATIENT)
Facility: LOCATION | Age: 54
End: 2024-08-07

## 2024-08-07 DIAGNOSIS — K82.4 GALLBLADDER POLYP: Primary | ICD-10-CM

## 2024-08-09 ENCOUNTER — ANESTHESIA (OUTPATIENT)
Dept: SURGERY | Facility: HOSPITAL | Age: 54
End: 2024-08-09
Payer: COMMERCIAL

## 2024-08-09 ENCOUNTER — HOSPITAL ENCOUNTER (OUTPATIENT)
Facility: HOSPITAL | Age: 54
Setting detail: HOSPITAL OUTPATIENT SURGERY
Discharge: HOME | End: 2024-08-09
Attending: SURGERY | Admitting: SURGERY
Payer: COMMERCIAL

## 2024-08-09 VITALS
RESPIRATION RATE: 16 BRPM | HEIGHT: 66 IN | OXYGEN SATURATION: 99 % | WEIGHT: 128.63 LBS | TEMPERATURE: 97 F | DIASTOLIC BLOOD PRESSURE: 68 MMHG | BODY MASS INDEX: 20.67 KG/M2 | SYSTOLIC BLOOD PRESSURE: 141 MMHG | HEART RATE: 60 BPM

## 2024-08-09 DIAGNOSIS — K82.4 GALLBLADDER POLYP: Primary | ICD-10-CM

## 2024-08-09 DIAGNOSIS — Z01.818 PRE-OP TESTING: ICD-10-CM

## 2024-08-09 DIAGNOSIS — I10 HYPERTENSION, UNSPECIFIED TYPE: ICD-10-CM

## 2024-08-09 LAB
GLUCOSE BLD-MCNC: 151 MG/DL (ref 70–99)
GLUCOSE BLD-MCNC: 99 MG/DL (ref 70–99)

## 2024-08-09 PROCEDURE — 47001 NDL BIOPSY LVR TM OTH MAJ PX: CPT | Performed by: SURGERY

## 2024-08-09 PROCEDURE — BF50200 OTHER IMAGING OF BILE DUCTS USING FLUORESCING AGENT, INDOCYANINE GREEN DYE, INTRAOPERATIVE: ICD-10-PCS | Performed by: SURGERY

## 2024-08-09 PROCEDURE — 0FT44ZZ RESECTION OF GALLBLADDER, PERCUTANEOUS ENDOSCOPIC APPROACH: ICD-10-PCS | Performed by: SURGERY

## 2024-08-09 PROCEDURE — 47562 LAPAROSCOPIC CHOLECYSTECTOMY: CPT | Performed by: SURGERY

## 2024-08-09 PROCEDURE — 47562 LAPAROSCOPIC CHOLECYSTECTOMY: CPT

## 2024-08-09 PROCEDURE — 0FD03ZX EXTRACTION OF LIVER, PERCUTANEOUS APPROACH, DIAGNOSTIC: ICD-10-PCS | Performed by: SURGERY

## 2024-08-09 RX ORDER — OXYCODONE HYDROCHLORIDE 5 MG/1
5 TABLET ORAL EVERY 6 HOURS PRN
Qty: 20 TABLET | Refills: 0 | Status: SHIPPED | OUTPATIENT
Start: 2024-08-09

## 2024-08-09 RX ORDER — HYDROCODONE BITARTRATE AND ACETAMINOPHEN 5; 325 MG/1; MG/1
2 TABLET ORAL ONCE AS NEEDED
Status: COMPLETED | OUTPATIENT
Start: 2024-08-09 | End: 2024-08-09

## 2024-08-09 RX ORDER — NICOTINE POLACRILEX 4 MG
30 LOZENGE BUCCAL
Status: DISCONTINUED | OUTPATIENT
Start: 2024-08-09 | End: 2024-08-09 | Stop reason: HOSPADM

## 2024-08-09 RX ORDER — NALOXONE HYDROCHLORIDE 0.4 MG/ML
0.08 INJECTION, SOLUTION INTRAMUSCULAR; INTRAVENOUS; SUBCUTANEOUS AS NEEDED
Status: DISCONTINUED | OUTPATIENT
Start: 2024-08-09 | End: 2024-08-09

## 2024-08-09 RX ORDER — NEOSTIGMINE METHYLSULFATE 1 MG/ML
INJECTION INTRAVENOUS AS NEEDED
Status: DISCONTINUED | OUTPATIENT
Start: 2024-08-09 | End: 2024-08-09 | Stop reason: SURG

## 2024-08-09 RX ORDER — HYDROMORPHONE HYDROCHLORIDE 1 MG/ML
0.4 INJECTION, SOLUTION INTRAMUSCULAR; INTRAVENOUS; SUBCUTANEOUS EVERY 5 MIN PRN
Status: DISCONTINUED | OUTPATIENT
Start: 2024-08-09 | End: 2024-08-09

## 2024-08-09 RX ORDER — BUPIVACAINE HYDROCHLORIDE AND EPINEPHRINE 5; 5 MG/ML; UG/ML
INJECTION, SOLUTION EPIDURAL; INTRACAUDAL; PERINEURAL AS NEEDED
Status: DISCONTINUED | OUTPATIENT
Start: 2024-08-09 | End: 2024-08-09 | Stop reason: HOSPADM

## 2024-08-09 RX ORDER — INDOCYANINE GREEN AND WATER 25 MG
5 KIT INJECTION ONCE
Status: COMPLETED | OUTPATIENT
Start: 2024-08-09 | End: 2024-08-09

## 2024-08-09 RX ORDER — ONDANSETRON 2 MG/ML
4 INJECTION INTRAMUSCULAR; INTRAVENOUS EVERY 6 HOURS PRN
Status: DISCONTINUED | OUTPATIENT
Start: 2024-08-09 | End: 2024-08-09

## 2024-08-09 RX ORDER — PROCHLORPERAZINE EDISYLATE 5 MG/ML
5 INJECTION INTRAMUSCULAR; INTRAVENOUS EVERY 8 HOURS PRN
Status: DISCONTINUED | OUTPATIENT
Start: 2024-08-09 | End: 2024-08-09

## 2024-08-09 RX ORDER — KETOROLAC TROMETHAMINE 30 MG/ML
INJECTION, SOLUTION INTRAMUSCULAR; INTRAVENOUS AS NEEDED
Status: DISCONTINUED | OUTPATIENT
Start: 2024-08-09 | End: 2024-08-09 | Stop reason: SURG

## 2024-08-09 RX ORDER — SODIUM CHLORIDE, SODIUM LACTATE, POTASSIUM CHLORIDE, CALCIUM CHLORIDE 600; 310; 30; 20 MG/100ML; MG/100ML; MG/100ML; MG/100ML
INJECTION, SOLUTION INTRAVENOUS CONTINUOUS
Status: DISCONTINUED | OUTPATIENT
Start: 2024-08-09 | End: 2024-08-09

## 2024-08-09 RX ORDER — GLYCOPYRROLATE 0.2 MG/ML
INJECTION, SOLUTION INTRAMUSCULAR; INTRAVENOUS AS NEEDED
Status: DISCONTINUED | OUTPATIENT
Start: 2024-08-09 | End: 2024-08-09 | Stop reason: SURG

## 2024-08-09 RX ORDER — HEPARIN SODIUM 5000 [USP'U]/ML
5000 INJECTION, SOLUTION INTRAVENOUS; SUBCUTANEOUS ONCE
Status: COMPLETED | OUTPATIENT
Start: 2024-08-09 | End: 2024-08-09

## 2024-08-09 RX ORDER — ACETAMINOPHEN 500 MG
1000 TABLET ORAL ONCE
Status: DISCONTINUED | OUTPATIENT
Start: 2024-08-09 | End: 2024-08-09 | Stop reason: HOSPADM

## 2024-08-09 RX ORDER — HYDROCODONE BITARTRATE AND ACETAMINOPHEN 5; 325 MG/1; MG/1
1 TABLET ORAL ONCE AS NEEDED
Status: COMPLETED | OUTPATIENT
Start: 2024-08-09 | End: 2024-08-09

## 2024-08-09 RX ORDER — ACETAMINOPHEN 500 MG
1000 TABLET ORAL ONCE AS NEEDED
Status: COMPLETED | OUTPATIENT
Start: 2024-08-09 | End: 2024-08-09

## 2024-08-09 RX ORDER — HYDROMORPHONE HYDROCHLORIDE 1 MG/ML
0.6 INJECTION, SOLUTION INTRAMUSCULAR; INTRAVENOUS; SUBCUTANEOUS EVERY 5 MIN PRN
Status: DISCONTINUED | OUTPATIENT
Start: 2024-08-09 | End: 2024-08-09

## 2024-08-09 RX ORDER — LIDOCAINE HYDROCHLORIDE 10 MG/ML
INJECTION, SOLUTION EPIDURAL; INFILTRATION; INTRACAUDAL; PERINEURAL AS NEEDED
Status: DISCONTINUED | OUTPATIENT
Start: 2024-08-09 | End: 2024-08-09 | Stop reason: SURG

## 2024-08-09 RX ORDER — NICOTINE POLACRILEX 4 MG
15 LOZENGE BUCCAL
Status: DISCONTINUED | OUTPATIENT
Start: 2024-08-09 | End: 2024-08-09 | Stop reason: HOSPADM

## 2024-08-09 RX ORDER — ONDANSETRON 2 MG/ML
INJECTION INTRAMUSCULAR; INTRAVENOUS
Status: COMPLETED
Start: 2024-08-09 | End: 2024-08-09

## 2024-08-09 RX ORDER — DEXTROSE MONOHYDRATE 25 G/50ML
50 INJECTION, SOLUTION INTRAVENOUS
Status: DISCONTINUED | OUTPATIENT
Start: 2024-08-09 | End: 2024-08-09 | Stop reason: HOSPADM

## 2024-08-09 RX ORDER — HYDROMORPHONE HYDROCHLORIDE 1 MG/ML
0.2 INJECTION, SOLUTION INTRAMUSCULAR; INTRAVENOUS; SUBCUTANEOUS EVERY 5 MIN PRN
Status: DISCONTINUED | OUTPATIENT
Start: 2024-08-09 | End: 2024-08-09

## 2024-08-09 RX ORDER — SCOLOPAMINE TRANSDERMAL SYSTEM 1 MG/1
1 PATCH, EXTENDED RELEASE TRANSDERMAL ONCE
Status: DISCONTINUED | OUTPATIENT
Start: 2024-08-09 | End: 2024-08-09

## 2024-08-09 RX ORDER — ROCURONIUM BROMIDE 10 MG/ML
INJECTION, SOLUTION INTRAVENOUS AS NEEDED
Status: DISCONTINUED | OUTPATIENT
Start: 2024-08-09 | End: 2024-08-09 | Stop reason: SURG

## 2024-08-09 RX ORDER — DEXAMETHASONE SODIUM PHOSPHATE 4 MG/ML
VIAL (ML) INJECTION AS NEEDED
Status: DISCONTINUED | OUTPATIENT
Start: 2024-08-09 | End: 2024-08-09 | Stop reason: SURG

## 2024-08-09 RX ORDER — SENNA AND DOCUSATE SODIUM 50; 8.6 MG/1; MG/1
1 TABLET, FILM COATED ORAL DAILY
Qty: 30 TABLET | Refills: 0 | Status: SHIPPED | OUTPATIENT
Start: 2024-08-09

## 2024-08-09 RX ORDER — ONDANSETRON 2 MG/ML
INJECTION INTRAMUSCULAR; INTRAVENOUS AS NEEDED
Status: DISCONTINUED | OUTPATIENT
Start: 2024-08-09 | End: 2024-08-09 | Stop reason: SURG

## 2024-08-09 RX ADMIN — ROCURONIUM BROMIDE 40 MG: 10 INJECTION, SOLUTION INTRAVENOUS at 07:30:00

## 2024-08-09 RX ADMIN — DEXAMETHASONE SODIUM PHOSPHATE 8 MG: 4 MG/ML VIAL (ML) INJECTION at 07:35:00

## 2024-08-09 RX ADMIN — ONDANSETRON 4 MG: 2 INJECTION INTRAMUSCULAR; INTRAVENOUS at 08:17:00

## 2024-08-09 RX ADMIN — NEOSTIGMINE METHYLSULFATE 3 MG: 1 INJECTION INTRAVENOUS at 08:21:00

## 2024-08-09 RX ADMIN — SODIUM CHLORIDE, SODIUM LACTATE, POTASSIUM CHLORIDE, CALCIUM CHLORIDE: 600; 310; 30; 20 INJECTION, SOLUTION INTRAVENOUS at 08:23:00

## 2024-08-09 RX ADMIN — KETOROLAC TROMETHAMINE 30 MG: 30 INJECTION, SOLUTION INTRAMUSCULAR; INTRAVENOUS at 08:17:00

## 2024-08-09 RX ADMIN — LIDOCAINE HYDROCHLORIDE 50 MG: 10 INJECTION, SOLUTION EPIDURAL; INFILTRATION; INTRACAUDAL; PERINEURAL at 07:30:00

## 2024-08-09 RX ADMIN — SODIUM CHLORIDE, SODIUM LACTATE, POTASSIUM CHLORIDE, CALCIUM CHLORIDE: 600; 310; 30; 20 INJECTION, SOLUTION INTRAVENOUS at 07:27:00

## 2024-08-09 RX ADMIN — GLYCOPYRROLATE 0.4 MG: 0.2 INJECTION, SOLUTION INTRAMUSCULAR; INTRAVENOUS at 08:21:00

## 2024-08-09 NOTE — BRIEF OP NOTE
Pre-Operative Diagnosis: Gallbladder polyp [K82.4]     Post-Operative Diagnosis: Gallbladder polyp [K82.4]      Procedure Performed:   LAPAROSCOPIC CHOLECYSTECTOMY POSSIBLE OPEN WITH INJECTION OF INDOCYANINE GREEN (ICG) AND LIVER BIOPSY    Surgeons and Role:     * Brant Soria MD - Primary    Assistant(s):  PA: Nancy Hall PA     Surgical Findings: mild chronic inflammation of gallbladder.      Specimen: liver biopsy x3      Estimated Blood Loss: Blood Output: 2 mL (8/9/2024  8:19 AM)      Dictation Number:      Brant Soria MD  8/9/2024  8:22 AM

## 2024-08-09 NOTE — ANESTHESIA PROCEDURE NOTES
Airway  Date/Time: 8/9/2024 7:32 AM  Urgency: elective    Airway not difficult    General Information and Staff    Patient location during procedure: OR  Anesthesiologist: Kale Kelsey MD  Resident/CRNA: Ifeanyi Noyola CRNA  Performed: CRNA   Performed by: Ifeanyi Noyola CRNA  Authorized by: Kale Kelsey MD      Indications and Patient Condition  Indications for airway management: anesthesia  Spontaneous Ventilation: absent  Sedation level: deep  Preoxygenated: yes  Patient position: sniffing  Mask difficulty assessment: 1 - vent by mask  Planned trial extubation    Final Airway Details  Final airway type: endotracheal airway      Successful airway: ETT  Cuffed: yes   Successful intubation technique: direct laryngoscopy  Facilitating devices/methods: intubating stylet  Endotracheal tube insertion site: oral  Blade: Sherri  Blade size: #3    Cormack-Lehane Classification: grade I - full view of glottis  Placement verified by: capnometry   Cuff volume (mL): 6  Measured from: lips  ETT to lips (cm): 21  Number of attempts at approach: 1  Number of other approaches attempted: 0    Additional Comments  Dentition per pre op

## 2024-08-09 NOTE — OPERATIVE REPORT
OPERATIVE REPORT   PREOPERATIVE DIAGNOSIS: Gallbladder polyp; fatty liver.     POSTOPERATIVE DIAGNOSIS: Same   PROCEDURE PERFORMED:   Laparoscopic cholecystectomy with ICG cholangiogram.   Percutaneous liver biopsy x 3  ASSISTANT: Ms Isabel PA-C.   ANESTHESIA: General endotracheal anesthesia.   Anesthesiologist.: Kale Kelsey MD  CRNA.: Ifeanyi Noyola CRNA  BLOOD LOSS: 2 mL.   COMPLICATIONS: None apparent.   FINDINGS:   mild chronic inflammation of the gallbladder  2. Normal biliary anatomy by ICG fluorescent imaging    DISPOSITION: The patient was awakened from anesthesia and brought to the recovery room in stable condition having tolerated the procedure without apparent complication. Needle, sponge, and instrument counts were correct at the end of the procedure.  Please note, preprocedure antibiotic and DVT prophylaxis were administered per protocol, and a time-out was performed prior to initiating operation, identifying the correct patient, procedure, and surgeon.   INDICATIONS FOR PROCEDURE: Please review the preprocedural history and physical. Briefly, the patient is a 53 year old female who now presents for cholecystectomy.  Liver biopsy os alsp performed at the request of her hepatologist.  The risks, benefits, and alternatives of surgical intervention were explained to the patient in detail including but not limited to bleeding, infection, recurrence, incorrect diagnosis, injury to adjacent organs and structures, and bile duct injury requiring possible immediate or delayed reconstruction potentially by a hepatobiliary surgeon, postoperative bile leak with retained common bile duct stone, the need for post-procedure ERCP as well as the need for further therapeutic diagnostic or surgical intervention. The possibility of conversion to open procedure was also explained to the patient. The patient did voice understanding. Allpertinent questions were answered to the patient's satisfaction after which the patient  provided willing and informed consent to proceed with surgery.   PROCEDURE:   NOTE:: A surgical assistant was absolutely essential to the proper conduct of this case, particularly in the performance of the cholangiogram, as well as the careful dissection necessary in and around the hilum of the gallbladder.  Prior to dividing any structures critical view of safety is obtained confirming clear identification of the cystic duct, cystic artery and the cystic plate per current guidelines.  DESCRIPTION OF PROCEDURE: The patient was brought to the operating room, and, after the induction of general endotracheal anesthesia, the abdomen was prepped and draped in the usual sterile fashion. The umbilicus was grasped and elevated with an Allis clamp and an 11-mm incision was made in the superior aspect of the umbilicus through which a Veress needle was placed. A pneumoperitoneum was established in usual manner. Next, an 11-mm trocar and a 10-mm laparoscope were placed into the abdomen. Brief diagnostic survey revealed no other acute pathology.   Attention was then turned to the right upper quadrant. The patient was positioned with head up, right side up, and three 5-mm incisions were made in the upper abdomen, one in the subxiphoid position and two in the subcostal region, through which 5-mm trocars were placed into the abdomen. A percutaneous core biopsy device is placed through a separate stab incision and three biopsies of the liver are obtained.  The biopsy sites are cauterized to control bleeding. The gallbladder was grasped, elevated, and retracted.  ICG fluorescent imaging, dissection was initiated in the triangle of Calot, with the cystic duct and cystic artery being identified superior to the line of Rouviere.  Elevated critical view was obtained demonstrating cystic duct, cystic artery and cystic plate in standard fashion. Next, duct and cystic artery were secured using hemoclips.  The artery and duct were then  divided using EndoShears.  The gallbladder was elevated and dissected free from the gallbladder fossa using electrocautery. Once the gallbladder was noted from the gallbladder fossa, it was extracted from the umbilical trocar site and Endo Catch bag and sent to Pathology for specimen.  Attention was turned to achieve hemostasis on the gallbladder fossa; this was achieved with electrocautery. The right upper quadrant was then copiously irrigated until the effluent fluid was clear.  The previously placed clips on the cystic duct and cystic artery were visualized and inspected; they were well approximated, well situated, and there was no evidence of active bleeding or bile leak. At this point, the pneumoperitoneum was released, and the upper trocars were removed under vision. The laparoscope was removed from the umbilicus, as was the trocar. The fascia at the umbilicus was reapproximated using 0 PDS suture. All skin incisions were cleansed, irrigated, and injected with local anesthetic. Skin incisions were reapproximated using subcuticular 4-0 Monocryl suture.  Dermabond/skin glue was used to seal all the incisions.  The patient was awakened from anesthesia and brought to the recovery room in stable condition, having tolerated the procedure without apparent complication.  Operative findings were discussed with the patient's family immediately upon conclusion of the procedure.         Brant Soria MD FACS  Trauma Medical Director, [facility] General Surgery    The 21st Century Cures Act makes medical notes like these available to patients in the interest of transparency. Please be advised this is a medical document. Medical documents are intended to carry relevant information, facts as evident, and the clinical opinion of the practitioner. The medical note is intended as peer to peer communication and may appear blunt or direct. It is written in medical language and may contain abbreviations or verbiage  that are unfamiliar.    This note was prepared using Dragon Medical voice recognition dictation software. As a result, errors may occur. When identified, these errors have been corrected. While every attempt is made to correct errors during dictation, discrepancies may still exist.

## 2024-08-09 NOTE — H&P
New Patient Visit Note       Active Problems      1. Gallbladder polyp    2. Pre-op testing    3. Hypertension, unspecified type        Chief Complaint   No chief complaint on file.      History of Present Illness     Patient presents at the request of her primary care physician and gastroenterologist for evaluation of gallbladder polyp.  In 2017 the patient had a gallbladder polyp seen on ultrasound measuring approximately 1.2 cm.  No intervention was suggested then.  The patient continues to have epigastric and right upper quadrant discomfort for which she had EGD showing acid reflux changes.  The patient has been placed on medication with relief.  The patient does experience now left upper quadrant pain for which a CT scan of the abdomen pelvis has been ordered but not completed.  I reviewed ultrasound images from 2017 and the most current ultrasound which confirms presence of gallbladder polyp.    The patient denies fever, chills, chest pain, shortness of breath, dyspnea. The patient also denies hematemesis, melena, or hematochezia. The patient denies change in bowel or bladder habits. There is no complaint of hematuria or dysuria.    I discussed the risk, benefits, alternatives of surgery.  I discussed the anticipated postoperative recovery including dietary, activity, exercise, and lifestyle recommendations.  The patient's questions regarding surgical procedure were answered and the patient voiced understanding of the care plan.    Allergies  Jennifer is allergic to epoxy resins and other.    Past Medical / Surgical / Social / Family History    The past medical and past surgical history have been reviewed by me today.    Past Medical History:    Anxiety    when mother was dx and  of pancreatic CA    Back pain    Bloating    Change in hair    Constipation    Diabetes (HCC)    Essential hypertension    Fibroids    Flatulence/gas pain/belching    Frequent urination    Headache disorder    Heart palpitations     High blood pressure    High cholesterol    Hyperlipidemia    Indigestion    Leaking of urine    Migraines    Nausea    Painful swallowing    Pap smear for cervical cancer screening    a year and half ago    Visual impairment    contacts/glasses    Wears glasses     Past Surgical History:   Procedure Laterality Date    Anterior repair  08/30/2022    Enterocele repair  08/30/2022    Hysterectomy      Midurethral sling  08/30/2022    Posterior repair  08/30/2022    Uterosacral ligament suspension  08/30/2022    Vaginal hysterectomy  08/30/2022    with bilateral salpingectomy       The family history and social history have been reviewed by me today.    Family History   Problem Relation Age of Onset    Hypertension Mother     Cancer Mother 38        Uterine, Pancreatic    Colon Polyps Mother     Diabetes Father     Heart Attack Father     Heart Disorder Maternal Grandmother     Heart Disorder Maternal Grandfather     Heart Disorder Paternal Grandmother     Other (Other) Paternal Grandfather     Uterine Cancer Maternal Aunt     Ovarian Cancer Maternal Aunt 45    Uterine Cancer Paternal Aunt     Breast Cancer Paternal Cousin Female 48    Breast Cancer Maternal Cousin Female 38     Social History     Socioeconomic History    Marital status:    Tobacco Use    Smoking status: Never    Smokeless tobacco: Never   Vaping Use    Vaping status: Never Used   Substance and Sexual Activity    Alcohol use: Not Currently     Comment: socially    Drug use: No    Sexual activity: Yes     Partners: Male   Other Topics Concern    Caffeine Concern Yes     Comment: 1 cup coffee per day    Exercise Yes     Comment: 2-3 x week, walking    Seat Belt Yes      No current outpatient medications on file.      Review of Systems  The Review of Systems has been reviewed by me during today.  Review of Systems   Constitutional:  Negative for chills, diaphoresis, fatigue, fever and unexpected weight change.   HENT:  Negative for hearing loss,  nosebleeds, sore throat and trouble swallowing.    Respiratory:  Negative for apnea, cough, shortness of breath and wheezing.    Cardiovascular:  Negative for chest pain, palpitations and leg swelling.   Gastrointestinal:  Negative for abdominal distention, abdominal pain, anal bleeding, blood in stool, constipation, diarrhea, nausea and vomiting.   Genitourinary:  Negative for difficulty urinating, dysuria, frequency and urgency.   Musculoskeletal:  Negative for arthralgias and myalgias.   Skin:  Negative for color change and rash.   Neurological:  Negative for tremors, syncope and weakness.   Hematological:  Negative for adenopathy. Does not bruise/bleed easily.   Psychiatric/Behavioral:  Negative for behavioral problems and sleep disturbance.        Physical Findings   /79 (BP Location: Left arm)   Pulse 66   Temp 97.7 °F (36.5 °C) (Temporal)   Resp 18   Ht 66\"   Wt 128 lb 9.6 oz (58.3 kg)   LMP 04/25/2022 (Approximate)   SpO2 99%   BMI 20.76 kg/m²   Physical Exam  Vitals and nursing note reviewed.   Constitutional:       General: She is not in acute distress.     Appearance: Normal appearance. She is well-developed.   HENT:      Head: Normocephalic and atraumatic.   Eyes:      General: No scleral icterus.     Conjunctiva/sclera: Conjunctivae normal.   Neck:      Trachea: No tracheal deviation.   Cardiovascular:      Rate and Rhythm: Normal rate and regular rhythm.      Heart sounds: S1 normal and S2 normal. No murmur heard.  Pulmonary:      Effort: No accessory muscle usage or respiratory distress.      Breath sounds: No decreased breath sounds, wheezing, rhonchi or rales.   Abdominal:      General: There is no distension or abdominal bruit.      Palpations: Abdomen is soft. Abdomen is not rigid. There is no shifting dullness, fluid wave, hepatomegaly, splenomegaly, mass or pulsatile mass.      Tenderness: There is no abdominal tenderness. There is no guarding or rebound. Negative signs include  Karimi's sign and McBurney's sign.      Hernia: No hernia is present. There is no hernia in the umbilical area or ventral area.   Skin:     General: Skin is warm and dry.   Neurological:      Mental Status: She is alert and oriented to person, place, and time.   Psychiatric:         Speech: Speech normal.         Behavior: Behavior normal.         Thought Content: Thought content normal.         Judgment: Judgment normal.             Assessment   1. Gallbladder polyp    2. Pre-op testing    3. Hypertension, unspecified type          Plan     The patient will be scheduled for laparoscopic cholecystectomy with ICG cholangiogram.  Patient's gallbladder polyp meets size criteria for removal.    With respect to her left upper quadrant pain I reinforced to the patient she should complete the CT scan which has been ordered and also follow-up with her GI team to determine further diagnostic workup and intervention as needed.    The jeannine-operative care plan was discussed with the patient, who voices understanding.  Activity and lifting recommendations were discussed in length.     The risks, benefits, and alternatives to the procedure were explained to the patient.  The risks explained include, but are not limited to, bleeding, infection, pain wound complications, recurrence, incorrect diagnosis, injury to adjacent organs and structures. We also discussed the possibile need for further therapeutic, diagnostic, or surgical intervention.  The patient voiced understanding, and after all questions were answered to the patient's satisfaction, the patient provided willing and informed consent to proceed.     Brant Soria MD

## 2024-08-09 NOTE — ANESTHESIA PREPROCEDURE EVALUATION
PRE-OP EVALUATION    Patient Name: Jennifer Freeman    Admit Diagnosis: Gallbladder polyp [K82.4]    Pre-op Diagnosis: Gallbladder polyp [K82.4]    LAPAROSCOPIC CHOLECYSTECTOMY POSSIBLE OPEN WITH INJECTION OF INDOCYANINE GREEN (ICG) AND LIVER BIOPSY    Anesthesia Procedure: LAPAROSCOPIC CHOLECYSTECTOMY POSSIBLE OPEN WITH INJECTION OF INDOCYANINE GREEN (ICG) AND LIVER BIOPSY    Surgeons and Role:     * Brant Soria MD - Primary    Pre-op vitals reviewed.  Temp: 97.7 °F (36.5 °C)  Pulse: 66  Resp: 18  BP: 130/79  SpO2: 99 %  Body mass index is 20.76 kg/m².    Current medications reviewed.  Hospital Medications:   acetaminophen (Tylenol Extra Strength) tab 1,000 mg  1,000 mg Oral Once    scopolamine (Transderm-Scop) 1 MG/3DAYS patch 1 patch  1 patch Transdermal Once    glucose (Dex4) 15 GM/59ML oral liquid 15 g  15 g Oral Q15 Min PRN    Or    glucose (Glutose) 40% oral gel 15 g  15 g Oral Q15 Min PRN    Or    glucose-vitamin C (Dex-4) chewable tab 4 tablet  4 tablet Oral Q15 Min PRN    Or    dextrose 50% injection 50 mL  50 mL Intravenous Q15 Min PRN    Or    glucose (Dex4) 15 GM/59ML oral liquid 30 g  30 g Oral Q15 Min PRN    Or    glucose (Glutose) 40% oral gel 30 g  30 g Oral Q15 Min PRN    Or    glucose-vitamin C (Dex-4) chewable tab 8 tablet  8 tablet Oral Q15 Min PRN    lactated ringers infusion   Intravenous Continuous    [COMPLETED] heparin (Porcine) 5000 UNIT/ML injection 5,000 Units  5,000 Units Subcutaneous Once    ceFAZolin (Ancef) 2g in 10mL IV syringe premix  2 g Intravenous Once    [COMPLETED] indocyanine green (IC-Green) injection 5 mg  5 mg Intravenous Once       Outpatient Medications:     Medications Prior to Admission   Medication Sig Dispense Refill Last Dose    acidophilus-pectin Oral Cap Take 1 capsule by mouth daily.       B Complex Vitamins (VITAMIN B COMPLEX OR) Inject as directed.   7/26/2024    Ascorbic Acid (VITAMIN C OR) Take by mouth.   7/26/2024    MAGNESIUM OR Take by mouth.    7/26/2024    metFORMIN 500 MG Oral Tab Take 1 tablet (500 mg total) by mouth daily with breakfast.   8/8/2024 at 1730    Meloxicam 7.5 MG Oral Tab as needed.   7/26/2024    omeprazole 20 MG Oral Capsule Delayed Release Take one capsule (20 mg total) by mouth once daily, 30 minutes prior to breakfast. 90 capsule 3 8/8/2024    NATURAL PSYLLIUM FIBER OR Take by mouth daily.   7/26/2024    fexofenadine-pseudoephedrine ER  MG Oral Tablet 12 Hr Take 1 tablet orally once a day at night.   7/26/2024    simvastatin 10 MG Oral Tab Take 1 tablet (10 mg total) by mouth nightly.   8/8/2024    Metoprolol Succinate ER 25 MG Oral Tablet 24 Hr Take 0.5 tablets (12.5 mg total) by mouth daily.   8/8/2024 at 1000    lisinopril 10 MG Oral Tab Take 1 tablet (10 mg total) by mouth daily.   8/8/2024       Allergies: Epoxy resins and Other      Anesthesia Evaluation    Patient summary reviewed.    Anesthetic Complications  (-) history of anesthetic complications         GI/Hepatic/Renal      (+) GERD and well controlled         (+) liver disease                 Cardiovascular    Negative cardiovascular ROS.  ECG reviewed.  Exercise tolerance: good     MET: >4      (+) hypertension and well controlled                                    Endo/Other      (+) diabetes and well controlled, type 2, not using insulin                         Pulmonary                           Neuro/Psych                              As per Epic:  Patient Active Problem List:     Acute chest pain     Special screening for malignant neoplasm of colon     Pelvic floor weakness     Abnormal EKG     Atrial tachycardia (HCC)     Dyslipidemia     Gastroesophageal reflux disease without esophagitis     Generalized anxiety disorder     Lipoma of right shoulder     Liver cyst     Malaise and fatigue     Palpitations     Right anterior shoulder pain     Subacromial bursitis of right shoulder joint     Abdominal pain, epigastric     Dysphagia     Mixed hyperlipidemia      Type 2 diabetes mellitus (HCC)     Vitamin D deficiency     Gallbladder polyp          Past Surgical History:   Procedure Laterality Date    Anterior repair  08/30/2022    Enterocele repair  08/30/2022    Hysterectomy      Midurethral sling  08/30/2022    Posterior repair  08/30/2022    Uterosacral ligament suspension  08/30/2022    Vaginal hysterectomy  08/30/2022    with bilateral salpingectomy     Social History     Socioeconomic History    Marital status:    Tobacco Use    Smoking status: Never    Smokeless tobacco: Never   Vaping Use    Vaping status: Never Used   Substance and Sexual Activity    Alcohol use: Not Currently     Comment: socially    Drug use: No    Sexual activity: Yes     Partners: Male   Other Topics Concern    Caffeine Concern Yes     Comment: 1 cup coffee per day    Exercise Yes     Comment: 2-3 x week, walking    Seat Belt Yes     History   Drug Use No     Available pre-op labs reviewed.  Lab Results   Component Value Date    WBC 4.4 07/25/2024    RBC 4.11 07/25/2024    HGB 12.4 07/25/2024    HCT 37.9 07/25/2024    MCV 92.2 07/25/2024    MCH 30.2 07/25/2024    MCHC 32.7 07/25/2024    RDW 12.3 07/25/2024    .0 07/25/2024     Lab Results   Component Value Date     07/25/2024    K 3.9 07/25/2024     07/25/2024    CO2 24.0 07/25/2024    BUN 14 07/25/2024    CREATSERUM 0.78 07/25/2024    GLU 98 07/25/2024    CA 9.3 07/25/2024            Airway      Mallampati: II  Mouth opening: >3 FB  TM distance: > 6 cm  Neck ROM: full Cardiovascular      Rhythm: regular  Rate: normal  (-) murmur   Dental  Comment: Dentition is grossly intact;  Patient does not demonstrate loose teeth to inspection.           Pulmonary  Comment: Unlabored ventilatory effort, no retractions.  Pulmonary exam normal.  Breath sounds clear to auscultation bilaterally.               Other findings              ASA: 2   Plan: general  NPO status verified and patient meets guidelines.        Comment: I  explained intrinsic risks of general anesthesia, including nausea, dental damage, sore throat, mouth injury,and hoarseness from airway management.  All questions were answered and understanding was demonstrated of risks.  Informed permission was obtained to proceed as documented in the signed consent form.     Daughter sweetie assisted translation, although patient refused hospital  and did not think daughter needed to translate.  Plan/risks discussed with: patient and child/children  Use of blood product(s) discussed with: patient and child/children    Consented to blood products.          Present on Admission:  **None**

## 2024-08-09 NOTE — ANESTHESIA POSTPROCEDURE EVALUATION
[facility] Patient Status:  Hospital Outpatient Surgery   Age/Gender 53 year old female MRN [id number]   Location [facility] POST ANESTHESIA CARE UNIT Attending Brant Soria MD   Hosp Day # 0 PCP Enrique Genao MD       Anesthesia Post-op Note    LAPAROSCOPIC CHOLECYSTECTOMY POSSIBLE OPEN WITH INJECTION OF INDOCYANINE GREEN (ICG) AND LIVER BIOPSY    Procedure Summary       Date: 08/09/24 Room / Location:  MAIN OR [address] [place] [address] MAIN OR    Anesthesia Start: 0727 Anesthesia Stop: 0839    Procedure: LAPAROSCOPIC CHOLECYSTECTOMY POSSIBLE OPEN WITH INJECTION OF INDOCYANINE GREEN (ICG) AND LIVER BIOPSY (Abdomen) Diagnosis:       Gallbladder polyp      (Gallbladder polyp [K82.4])    Surgeons: Brant Soria MD Anesthesiologist: Kale Kelsey MD    Anesthesia Type: general ASA Status: 2            Anesthesia Type: general    Vitals Value Taken Time   /61 08/09/24 0839   Temp 97 08/09/24 0839   Pulse 74 08/09/24 0839   Resp 20 08/09/24 0839   SpO2 99 % 08/09/24 0838   Vitals shown include unfiled device data.    Patient Location: PACU    Anesthesia Type: general    Airway Patency: patent and extubated    Postop Pain Control: adequate    Mental Status: preanesthetic baseline    Nausea/Vomiting: none    Cardiopulmonary/Hydration status: stable euvolemic    Complications: no apparent anesthesia related complications    Postop vital signs: stable    Dental Exam: Unchanged from Preop    Patient to be discharged from PACU when criteria met.

## 2024-08-09 NOTE — DISCHARGE INSTRUCTIONS
Home Care Instructions  Cholecystectomy  Dr Brant Soria    MEDICATIONS  For post-operative pain control the medication is usually oxycodone.  This is a narcotic and is best taken by starting with one tablet and repeating every four to six hours as needed. If the patient does not feel they need the narcotics they shouldn’t take them. If the pain is severe the patient may take up to two pills every four hours. If a minor supplement is necessary in addition to the narcotics please supplement with Tylenol and Advil (ibuprofen) or Motrin.  Take care not to exceed 4 g (4000 mg) of Tylenol daily.  Please ask your surgeon before resuming blood thinners such as aspirin, plavix or coumadin. All other home medications may be resumed as scheduled. With severe appendicitis antibiotics will also be prescribed. With antibiotics, please watch for rash, facial swelling or severe diarrhea.    DIET  The patient may resume a general diet immediately. This is not a good time to eat excessively. The patient should eat in moderation and stick with foods the patient feels are easy to digest. Avoid high fat foods in the immediate post-operative time period as this may still cause some problems. The patient may eat anything in small amounts but foods rich in dairy products, fatty foods or fried foods may cause an upset stomach for up to six weeks after surgery. There should be no alcohol consumption in the immediate recover time period or within six hours of taking narcotics.    WOUND CARE  The top dressing may be removed the day after surgery. This includes the gauze, tape and band-aids if they are present. Do not remove the steri-strips or butterfly tapes that are white and adherent to the skin. The steri-strips will eventually peel up at the ends and at this point they may be removed. This is usually seven to ten days after surgery. The patient may shower the day after surgery. There is no need to cover the incisions and all top gauze  type dressing should be removed prior to showering. Soap can get on the wounds but do not scrub over the wounds. No hair dye or chemicals of any kind should get in the wounds. Avoid tub baths for two weeks. If visible sutures or staples are present they will be removed in the office by the surgeon or nurse. Most wounds will be closed with dissolving suture underneath the skin. These sutures will dissolve on their own. If a drain is present make sure the patient receives drain care instructions from the nurse prior to discharge. Most patients will not have a drain.    ACTIVITY  Every day the patient should be up, showered and dressed. Each day the patient should be up and around the house. The patient should not lie in bed and should not stay in pajamas. We count on the patient being up, coughing, walking and deep breathing to avoid pneumonia and blood clots in the legs. Once a day the patient should get out of the house and go shopping, go to the mall, the [employer] store, the movies or a restaurant. The patient may ride in a car but should not drive the car for at least one week. Patients should be off narcotics for at least 8 hours prior to being a . The average time off work is 10 to 14 days; most adults will be seeing the surgeon prior to returning to work. Students will return to school within 1-5 days after discharge from the hospital but will be off gym, sports, and indoors for recess for one month. Patients may go up and down stairs and lift up to five pounds but no bending, pushing or pulling. Nothing called work or exercise until the follow up visit. No ‘stair-master’, power walking, jogging or workouts until the follow up visit. Patients should seek further activity limits at the time of their appointment.    APPOINTMENT  Please call our office for an appointment within five to ten days of discharge Any fever greater than 100.5, chills, nausea, vomiting, or severe diarrhea please call out office. If  the wound turns red, hot, swollen, becomes increasingly painful, or drains pus call us immediately at [phone]. For life threatening emergencies call 911. For non-emergent care please call our office after 9:30 a.m. Monday through Saturday. The number listed above is out office number, out phone automatically switches to out answering service if we are not there. Please do not use the emergency room for non-urgent care.      Thank you for entrusting me with your care.  [facility]         You received a drug called Toradol which is an Anti Inflammatory at: 8:15am.  If you are allowed to take Anti inflammatories:    Do not take any Anti Inflammatory like Motrin, Aleve or Ibuprophen until after: 2:15pm.  Please report any suspected allergic reactions or bleeding issues to your doctor

## 2024-08-23 ENCOUNTER — OFFICE VISIT (OUTPATIENT)
Facility: LOCATION | Age: 54
End: 2024-08-23
Payer: COMMERCIAL

## 2024-08-23 VITALS
RESPIRATION RATE: 21 BRPM | BODY MASS INDEX: 21 KG/M2 | HEIGHT: 66 IN | HEART RATE: 80 BPM | TEMPERATURE: 97 F | OXYGEN SATURATION: 98 %

## 2024-08-23 DIAGNOSIS — Z98.890 POST-OPERATIVE STATE: Primary | ICD-10-CM

## 2024-08-23 RX ORDER — FLUTICASONE PROPIONATE 50 MCG
2 SPRAY, SUSPENSION (ML) NASAL DAILY
COMMUNITY
Start: 2024-07-16

## 2024-08-23 RX ORDER — MONTELUKAST SODIUM 10 MG/1
10 TABLET ORAL NIGHTLY
COMMUNITY
Start: 2024-07-29

## 2024-08-23 NOTE — PROGRESS NOTES
Post Operative Visit Note       Active Problems  1. Post-operative state         Chief Complaint   Chief Complaint   Patient presents with    Post-Op     PO - LAPAROSCOPIC CHOLECYSTECTOMY POSSIBLE OPEN WITH INJECTION OF INDOCYANINE  GREEN (ICG) AND LIVER BIOPSY 24 PBP. States that she is doing well               History of Present Illness   53 year old female presents today for postoperative visit following laparoscopic cholecystectomy on 2024 by Dr. Soria.    The patient states that since her surgery that she is overall doing very well.  She denies complaints today.  She denies nausea or vomiting.  She denies diarrhea or constipation.  She denies fever or chills.  She states that his incisions are healing well.  There is no redness or drainage noted.        Allergies  Jennifer is allergic to epoxy resins and other.    Past Medical / Surgical / Social / Family History    The past medical and past surgical history have been reviewed by me today.     Past Medical History:    Anxiety    when mother was dx and  of pancreatic CA    Back pain    Bloating    Change in hair    Constipation    Diabetes (HCC)    Essential hypertension    Fibroids    Flatulence/gas pain/belching    Frequent urination    Headache disorder    Heart palpitations    High blood pressure    High cholesterol    Hyperlipidemia    Indigestion    Leaking of urine    Migraines    Nausea    Painful swallowing    Pap smear for cervical cancer screening    a year and half ago    Visual impairment    contacts/glasses    Wears glasses     Past Surgical History:   Procedure Laterality Date    Anterior repair  2022    Enterocele repair  2022    Hysterectomy      Midurethral sling  2022    Posterior repair  2022    Uterosacral ligament suspension  2022    Vaginal hysterectomy  2022    with bilateral salpingectomy       The family history and social history have been reviewed by me today.    Family History    Problem Relation Age of Onset    Hypertension Mother     Cancer Mother 38        Uterine, Pancreatic    Colon Polyps Mother     Diabetes Father     Heart Attack Father     Heart Disorder Maternal Grandmother     Heart Disorder Maternal Grandfather     Heart Disorder Paternal Grandmother     Other (Other) Paternal Grandfather     Uterine Cancer Maternal Aunt     Ovarian Cancer Maternal Aunt 45    Uterine Cancer Paternal Aunt     Breast Cancer Paternal Cousin Female 48    Breast Cancer Maternal Cousin Female 38     Social History     Socioeconomic History    Marital status:    Tobacco Use    Smoking status: Never    Smokeless tobacco: Never   Vaping Use    Vaping status: Never Used   Substance and Sexual Activity    Alcohol use: Not Currently     Comment: socially    Drug use: No    Sexual activity: Yes     Partners: Male   Other Topics Concern    Caffeine Concern Yes     Comment: 1 cup coffee per day    Exercise Yes     Comment: 2-3 x week, walking    Seat Belt Yes        Current Outpatient Medications:     montelukast 10 MG Oral Tab, Take 1 tablet (10 mg total) by mouth nightly., Disp: , Rfl:     fluticasone propionate 50 MCG/ACT Nasal Suspension, 2 sprays by Nasal route daily., Disp: , Rfl:     oxyCODONE 5 MG Oral Tab, Take 1 tablet (5 mg total) by mouth every 6 (six) hours as needed for Pain., Disp: 20 tablet, Rfl: 0    senna-docusate 8.6-50 MG Oral Tab, Take 1 tablet by mouth daily., Disp: 30 tablet, Rfl: 0    acidophilus-pectin Oral Cap, Take 1 capsule by mouth daily., Disp: , Rfl:     B Complex Vitamins (VITAMIN B COMPLEX OR), Inject as directed., Disp: , Rfl:     Ascorbic Acid (VITAMIN C OR), Take by mouth., Disp: , Rfl:     MAGNESIUM OR, Take by mouth., Disp: , Rfl:     metFORMIN 500 MG Oral Tab, Take 1 tablet (500 mg total) by mouth daily with breakfast., Disp: , Rfl:     Meloxicam 7.5 MG Oral Tab, as needed., Disp: , Rfl:     omeprazole 20 MG Oral Capsule Delayed Release, Take one capsule (20 mg  total) by mouth once daily, 30 minutes prior to breakfast., Disp: 90 capsule, Rfl: 3    NATURAL PSYLLIUM FIBER OR, Take by mouth daily., Disp: , Rfl:     fexofenadine-pseudoephedrine ER  MG Oral Tablet 12 Hr, Take 1 tablet orally once a day at night., Disp: , Rfl:     simvastatin 10 MG Oral Tab, Take 1 tablet (10 mg total) by mouth nightly., Disp: , Rfl:     Metoprolol Succinate ER 25 MG Oral Tablet 24 Hr, Take 0.5 tablets (12.5 mg total) by mouth daily., Disp: , Rfl:     lisinopril 10 MG Oral Tab, Take 1 tablet (10 mg total) by mouth daily., Disp: , Rfl:       Review of Systems  A 10 point Review of Systems has been completed by me today and is negative except as above in the HPI.    Physical Findings   Pulse 80   Temp 97.2 °F (36.2 °C) (Temporal)   Resp 21   Ht 66\"   LMP 2022 (Approximate)   SpO2 98%   BMI 20.76 kg/m²   Gen/psych: alert and oriented, cooperative, no apparent distress  Cardiovascular: regular rate  Respiratory: respirations unlabored, no wheeze  Abdominal: soft, non-tender, non-distended, no guarding/rebound  Incisions:  Incisions are healing well.  There is no redness or drainage noted.       Assessment/Plan  1. Post-operative state        The patient is doing well following laparoscopic cholecystectomy.  The patient is to continue with diet as tolerated.  The patient is to continue with lifting restrictions of no more than 20 pounds for 4 weeks from the date of the surgery.  Surgical pathology was discussed with the patient.  All questions and concerns were answered.  The patient is return to the clinic as needed.       Orders Placed This Encounter    montelukast 10 MG Oral Tab     Sig: Take 1 tablet (10 mg total) by mouth nightly.    fluticasone propionate 50 MCG/ACT Nasal Suspension     Si sprays by Nasal route daily.        Imaging & Referrals   None    Follow Up  Return if symptoms worsen or fail to improve.    Tosin Crocker PA-C  [facility]  2024  10:41  AM

## 2024-09-06 ENCOUNTER — ORDER TRANSCRIPTION (OUTPATIENT)
Dept: ADMINISTRATIVE | Facility: HOSPITAL | Age: 54
End: 2024-09-06

## 2024-09-06 DIAGNOSIS — E11.9 TYPE 2 DIABETES MELLITUS (HCC): Primary | ICD-10-CM

## 2024-09-24 ENCOUNTER — DIABETIC EDUCATION (OUTPATIENT)
Age: 54
End: 2024-09-24
Payer: COMMERCIAL

## 2024-09-24 DIAGNOSIS — E11.9 TYPE 2 DIABETES MELLITUS WITHOUT COMPLICATION, WITHOUT LONG-TERM CURRENT USE OF INSULIN (HCC): Primary | ICD-10-CM

## 2024-09-24 PROCEDURE — G0108 DIAB MANAGE TRN  PER INDIV: HCPCS

## 2024-09-24 NOTE — PATIENT INSTRUCTIONS
You can always ask for a referral to see one of our Diabetes NPs or one of our Endocrinologists for more diabetes specific care.    Ana TAMAYO & Serena Hoyanira APRN  [phone]    Natali TAMAYO & Eileen Slater APRN  [phone]    Dr. Martine Aquino & Dr. Tiera Vyas  [phone]    _______________________________________________    Healthy Eating:    Continue to use the Plate Method as a model for your meals:  1/2 of your plate is non-starchy vegetables, 1/4 of your plate is lean protein, 1/4 of your plate is starchy or carbohydrate foods    Try to measure your foods for a week or so to be able to identify how much you are eating. Carb counting apps can be helpful with this.    Pair a carbohydrate with protein, fat, and fiber foods to help stabilize blood sugar.     Monitoring:     Continue with your Sreekanth sensor.    ADA (American Diabetes Association):  A1c:  7% or less  Fasting Blood Glucose (before you eat breakfast):    2 Hours After a Meal:  Less than 180    Physical Activity:    Try to add in walks after meals. This can help with glucose levels especially after a carbohydrate heavy meal.    There are studies that show that even 20 minutes of exercise can positively impact glucose levels for up to 72 hours after!    Exercise Resources:  Chair exercise videos on YouTube - https://www.youWistiaube.com/watch?v=2wNoTsxlmDI&[email or web address]      Yoga for beginners on YouTube - https://www.youWistiaube.com/watch?v=nQwKKCqkJxg      Coping:    Find a support group on [employer] to find people just like you.    Talk to your family or friends.    Meditate or do yoga. Go for a walk to clear your mind.

## 2024-09-24 NOTE — PROGRESS NOTES
Jennifer Freeman   10/7/1970 attended [employer] [facility].    Referring Provider: Enrique Genao MD     Date: 9/24/2024  Start time: 9:00 AM End time: 9:55 AM  _______________________________________________     Ms. Freeman is a 53 year old female who presents today with a new diagnosis of diabetes. She declined the use of  services today.  She was told she had diabetes spring 2024 but then later was told she has had it since 2000s. She is looking to establish care with Endocrinology. She was originally started on Metformin but noted approximately 20 lbs of weight loss. She was also wearing CGM (Sreekanth 3) and noted lows down into the 60s so she stopped her Metformin. She had been avoiding most carbohydrates as well.    Assessment:     Wt Readings from Last 6 Encounters:   08/09/24 128 lb 9.6 oz   05/15/24 137 lb 6.4 oz   02/29/24 144 lb 9.6 oz   02/22/24 144 lb   12/27/23 145 lb   12/07/23 147 lb 8 oz       HgbA1C (%)   Date Value   03/07/2024 6.5 (H)        Diabetes Overview, pathophysiology, A1C results and treatment options for diabetes self-management:   Described basic process and treatment options for diabetes self-management.    Blood Glucose Levels:     Currently checking BG: No, information provided for blood glucose meter. She has been using Sreekanth 3. Provided glucometer sample.    Name: Jennifer Freeman  YOB: 1970  Report Period: 09/11/2024 - 09/24/2024 (14 days)  Generated: 09/24/2024  % Time CGM Active: 78%      Glucose Statistics and Targets  Average Glucose: 127 mg/dL  Glucose Management Indicator (GMI): 6.3%  Glucose Variability (%CV): 8.3%  Target Range: 70 - 180 mg/dL      Time in Ranges  Very High: >250 mg/dL --- 0%  High: 181 - 250 mg/dL --- 0%  Target Range: 70 - 180 mg/dL --- 100%  Low: 54 - 69 mg/dL --- 0%  Very Low: <54 mg/dL --- 0%    CGM report reviewed in detail with patient. Sent to scan.     Healthy Habits:     Obtained usual diet history: uses [employer]  for helping picking out food.      TYPICAL  FOOD  NOTES   Breakfast  Spinach, avocado, cactus (shake), nopalis with peppers, egg, bread low carb from [employer].        Lunch  Tuna sandwich with avocado, pickled peppers, bread (sourdough) 1 slice.        Dinner  Enchiladas, beans. Lentil soup, meat.          Snacks  Cookies, seita cookies (low carb)         Beverages  Coffee with cream, water. Stopped wine.            Instructed on basic diet guidelines including aiming for 3 meals/day, balancing each meal with variety of nutrients, using plate method as a model for meals - goal of 1/2 plate non-starchy vegetables, 1/4 plate lean proteins, 1/4 plate carbohydrates and modest portions of fruit, yogurt, milk if desired.    We reviewed impact of carbohydrates, fiber, protein, and fat on glucose levels and trends. We discussed balancing meals and snacks with a combination of carbohydrates, proteins, and fiber to help stabilize glucose levels.     We discussed aiming for consistent meal times/carb amounts can help us determine if insulin dosages are correct. Reviewed carbohydrate counting, goals for meals (30-45 grams per meal) and snacks (15 grams per snack). Reviewed that carb counting consistently for a week or so can help regain control over glucose.    Carbohydrate counting seven recommendations provided. Carb \"Cheat Sheet\" provided for quick references.    Sleep Patterns: does not sleep very well, daughter needs a lot of assistance over night.    Being Active: Discussed benefits of regular Physical Activity and goal to complete 30 minutes daily. Currently does walking about 20+ minutes per day.    Medications:     Taking Medications: Reviewed current diabetes medications: yes, she is not currently taking medications.  Diabetes Medication:     Oral:  Metformin XR 500mg daily (in afternoon), stopped on her own due to weight loss.       Goals:     Problem Solving: Prevention, detection, and treatment of acute complications:  taught symptoms of hypoglycemia, hyperglycemia, how to treat low blood sugar (Rule of 15), and actions for lowering high blood glucose levels.    Patient set diabetes self-management goals: identify carbohydrate foods, portion sizes, balancing meals.    Recommendations:     Continue using Sreekanth 3 sensor for glucose monitoring.    Establish care with Endocrinology if not already scheduled.     Aim to follow a meal plan with a consistent amount of carbohydrates for meals and snacks:  Goal for meals is 30-45 grams of carbohydrates for each meal (3 meals per day)  Goal for snacks is 15 grams of carbohydrates for each snack (1-2 per day)    Aim to include carbohydrates plus protein with meals and snacks    Patient is willing to make lifestyle changes to improve blood glucose management.    Emailed/written materials provided for all areas covered.  Diabetes 101 reviewed in detail with patient, provided Citizen of [place] version.    Patient verbalized understanding and has no further questions at this time.    Patient to contact diabetes center for questions/concerns.    Kaci MANE-SAMANTHA, [facility]

## 2024-09-26 ENCOUNTER — LAB ENCOUNTER (OUTPATIENT)
Dept: LAB | Age: 54
End: 2024-09-26
Attending: NURSE PRACTITIONER
Payer: COMMERCIAL

## 2024-09-26 ENCOUNTER — OFFICE VISIT (OUTPATIENT)
Facility: CLINIC | Age: 54
End: 2024-09-26
Payer: COMMERCIAL

## 2024-09-26 VITALS
SYSTOLIC BLOOD PRESSURE: 128 MMHG | WEIGHT: 128 LBS | OXYGEN SATURATION: 98 % | HEIGHT: 66 IN | BODY MASS INDEX: 20.57 KG/M2 | DIASTOLIC BLOOD PRESSURE: 84 MMHG | HEART RATE: 73 BPM

## 2024-09-26 DIAGNOSIS — E78.2 MIXED HYPERLIPIDEMIA: Primary | ICD-10-CM

## 2024-09-26 DIAGNOSIS — I10 PRIMARY HYPERTENSION: ICD-10-CM

## 2024-09-26 DIAGNOSIS — E11.9 TYPE 2 DIABETES MELLITUS WITHOUT COMPLICATION, WITHOUT LONG-TERM CURRENT USE OF INSULIN (HCC): Primary | ICD-10-CM

## 2024-09-26 DIAGNOSIS — E78.2 MIXED HYPERLIPIDEMIA: ICD-10-CM

## 2024-09-26 LAB
CHOLEST SERPL-MCNC: 154 MG/DL (ref ?–200)
FASTING PATIENT LIPID ANSWER: NO
HDLC SERPL-MCNC: 54 MG/DL (ref 40–59)
HEMOGLOBIN A1C: 5.8 % (ref 4.3–5.6)
LDLC SERPL CALC-MCNC: 78 MG/DL (ref ?–100)
NONHDLC SERPL-MCNC: 100 MG/DL (ref ?–130)
TRIGL SERPL-MCNC: 125 MG/DL (ref 30–149)
VLDLC SERPL CALC-MCNC: 19 MG/DL (ref 0–30)

## 2024-09-26 PROCEDURE — 83036 HEMOGLOBIN GLYCOSYLATED A1C: CPT | Performed by: NURSE PRACTITIONER

## 2024-09-26 PROCEDURE — 36415 COLL VENOUS BLD VENIPUNCTURE: CPT

## 2024-09-26 PROCEDURE — 95251 CONT GLUC MNTR ANALYSIS I&R: CPT | Performed by: NURSE PRACTITIONER

## 2024-09-26 PROCEDURE — 84681 ASSAY OF C-PEPTIDE: CPT

## 2024-09-26 PROCEDURE — 80061 LIPID PANEL: CPT

## 2024-09-26 PROCEDURE — 99214 OFFICE O/P EST MOD 30 MIN: CPT | Performed by: NURSE PRACTITIONER

## 2024-09-26 NOTE — PATIENT INSTRUCTIONS
Follow up plan:  With RONI Correa: Return in about 3 months (around 12/26/2024).    [ x ] In person only  [ x ] After visit summary   - fasting -lipid blood test  - non fasting- c peptide  blood test      - meet with Caroline (our diabetes educator) in 6 weeks for:   Video visit to check CGM and see if needed to increase Metformin   - Visits with Velazquez are considered a 'nurse visit' and are an extension of your services you receive here. The visit will appear on your Mychart as a scheduled visit so you know when she is going to call you or meet with you. If you need to reschedule, please call and cancel to avoid a no show fee. If you do not show up to your appointment or miss her call within 15 minutes of the visit, you will be assessed a $40 late fee.      Discharge Instruction:  - Can take Metformin as needed,  if glucose is above 130 fasting, take the Metformin  500 mg, for now do not take it.   - check glucose once a day: fasting or at bedtime  - Targeted fasting glucose (at least 8-10 hrs of no food/sweetened beverage intake)< 120-130 and 2 hrs after lunch or dinner< 180  and prior lunch or dinner <140  -  Follow 15g/15 min rule if you develop any hypoglycemia symptoms w/ glucose <70  but if glucose <55 follow 30g/15 min rule. Once glucose above 80, eat a protein or food w protein.   - repeat A1c in 3 months   Balanced diet: carbohydrates, protein, healthy fats and fiber in each meal. Exercise of at least 150 mins each week. Decrease your simple carbohydrates intake such as sweets: cookies, soda, candy, white rice, white pasta, syrups        Office phone number: [phone]; phones are open Monday-Friday 8:30-4:30.   Thank you for visiting our office. We look forward to working with you to reach your health goals. As a reminder, if you need refills, please request early so there is enough time to process the request. We ask that you provide at least 5 days' notice before a refill is due, so there is time to  send a request to pharmacy, process the refill, and ensure there are no other problems with obtaining the medication (backorders, prior authorization paperwork, etc). Routine refills will not be addressed on weekends, so please submit these requests during the week.    Blood sugar targets:  Before breakfast: , 2 hours after meals: <180, prior lunch or dinner < 140  Time in Range goal is higher than 80% if using a continuous glucose monitor (Dexcom or Sreekanth).  Time in Range can be found within the Dexcom G7 seven, on Clarity if using Dexcom G6, or on LibreView if using Sreekanth.    HOW TO TREAT LOW BLOOD SUGAR (Hypoglycemia)  Low blood sugar= Less than 70, or if you start to have symptoms (below)  Symptoms: Shaking or trembling, fast heart rate, extreme hunger, sweating, confusion/difficulty concentrating, dizziness.    How to treat a low blood sugar if you are able to eat/drink: The Rule of 15/15  If you are using continuous glucose monitor that says you are low, but you do not have any symptoms, verify on fingerstick that your blood sugar is actually low before treating.   Eat 15 grams of carbs (see examples below)  Check your blood sugar after 15 minutes. If it’s still below your target range, have another serving.   Repeat these steps until it’s in your target range. Once it’s in range, if you're nervous about your sugar going low again, have a protein source (ie, a spoonful of peanut butter).   If you have a CGM you want to look for how your arrow has changed. If you arrow is pointed up or sideways after 15 min, give your CGM more time OR check with a finger stick. Try not to eat more food until at least 15 min after the first BG check - otherwise you risk having a rebound high.  If you are experiencing symptoms and you are unable to check your blood glucose for any reason, treat the hypoglycemia.  If someone has a low blood sugar and is unconscious: Don’t hesitate to call 911. If someone is unconscious and  glucagon is not available or someone does not know how to use it, call 911 immediately.    To treat a low, I recommend you carry with you easy, pre-portioned treatment for low blood sugars that are 15G of carbs:   - Children sized squeeze pouch applesauce (high fiber + carbs help prevent too high of a spike)  - Small children's sized juicebox- 15g carb --> 4oz juice box  - Glucose tablets from [employer], you can find them near diabetes supplies --> Note, you will need to eat 3-4 tablets to get to 15g of carbs  - Children sized fruit snack pack- look for one with 15 grams of total carbohydrate  - Choice of how to treat your low is important. Complex carbs, or foods that contain fats along with carbs (like chocolate) can slow the absorption of glucose and should NOT be used to treat an emergency low

## 2024-09-26 NOTE — PROGRESS NOTES
[facility] Note    Name: Jennifer Freeman    Date: 9/26/24    Jennifer Freeman is a 53 year old female who presents for evaluation of T2DM management.     Chief complaint: New Patient (NP here to establish care for DM2, per pt after having gallbladder removed BG have been high.  Pt would like to discuss medication options. /Pt has CGM Sreekanth-connected/Last A1c value was 6.5% done 3/7/2024. /Last Diabetic Eye Exam- Never had one done/Last Diabetic Foot Exam- Never had one done/)       Subjective:   DM hx:  -Diagnosed with diabetes in March, 2024  -Family history- yes, father       Initial HPI consult - 9/26/2024  She states she lost weight with Metformin. Initially she was managed by PCP.   Per pt, the other PCP in her PCP's office mentioned to her to stop taking her MF after she noted hypoglycemia of 50s in August.  DM meds at first office visit:She last took  Metformin  mg once a day mid week of August due to   hypoglycemia: Yes 50     Continuous Glucose Monitoring Interpretation  Jennifer Freeman has undergone continuous glucose monitoring with the [employer] Sreekanth 3 CGM with phone (connected to clinic profile).  The blood glucose tracings were evaluated for two weeks prior to office visit. Blood glucose tracings demonstrated no significant hyperglycemia. There were no significant hypoglycemia notedduring the weeks of evaluation.    Date: 9/13-9/26: %, GMI 6.4% ,  low 0% , very low 0%, SD 8.4mg/dl, Sensor usage: 92%      -Re: potential DM medication contraindication (if positive, checkbox selected):  [] History of pancreatitis- denies   [] Personal/fam hx of medullary thyroid cancer/MEN2- denies   [x] History of recent/frequent UTI/yeast infxn- last July   [] Previous amputation related to diabetes - denies     -Presence of associated DM complications (if positive, checkbox selected):  [] Macrovascular complications (CAD/CVA/PAD)- denies   [] Neuropathy- right hand numbness?  [] Retinopathy- summer  2023, [facility],   [] Nephropathy- denies   [x] HTN  [x] Hyperlipidemia  [] Stroke/TIA- denies   [] Gastroparesis- denies   [] Wound, ulcer or amputations- denies     - Lifestyle: eat 3x a day , states being aware of her carbs intake now   - Modifying factors: none     [] Steroid use: denies     Previously trialed/failed DM meds: none     History/Other:    Allergies, PMH, SocHx and FHx reviewed and updated as appropriate in Epic on    metFORMIN 500 MG Oral Tab Take 1 tablet (500 mg total) by mouth daily with breakfast. 90 tablet 0    montelukast 10 MG Oral Tab Take 1 tablet (10 mg total) by mouth nightly.      oxyCODONE 5 MG Oral Tab Take 1 tablet (5 mg total) by mouth every 6 (six) hours as needed for Pain. 20 tablet 0    senna-docusate 8.6-50 MG Oral Tab Take 1 tablet by mouth daily. 30 tablet 0    acidophilus-pectin Oral Cap Take 1 capsule by mouth daily.      B Complex Vitamins (VITAMIN B COMPLEX OR) Inject as directed.      Ascorbic Acid (VITAMIN C OR) Take by mouth.      MAGNESIUM OR Take by mouth.      omeprazole 20 MG Oral Capsule Delayed Release Take one capsule (20 mg total) by mouth once daily, 30 minutes prior to breakfast. 90 capsule 3    NATURAL PSYLLIUM FIBER OR Take by mouth daily.      fexofenadine-pseudoephedrine ER  MG Oral Tablet 12 Hr Take 1 tablet orally once a day at night.      simvastatin 10 MG Oral Tab Take 1 tablet (10 mg total) by mouth nightly.      Metoprolol Succinate ER 25 MG Oral Tablet 24 Hr Take 0.5 tablets (12.5 mg total) by mouth daily.      lisinopril 10 MG Oral Tab Take 1 tablet (10 mg total) by mouth daily.       Allergies   Allergen Reactions    Epoxy Resins RASH    Other RASH     Patients she recently had allergy skin testing and is allergic Epoxy  - patient states unsure of exposure - but had a rash on face     Current Outpatient Medications   Medication Sig Dispense Refill    metFORMIN 500 MG Oral Tab Take 1 tablet (500 mg total) by mouth daily with breakfast. 90  tablet 0    montelukast 10 MG Oral Tab Take 1 tablet (10 mg total) by mouth nightly.      oxyCODONE 5 MG Oral Tab Take 1 tablet (5 mg total) by mouth every 6 (six) hours as needed for Pain. 20 tablet 0    senna-docusate 8.6-50 MG Oral Tab Take 1 tablet by mouth daily. 30 tablet 0    acidophilus-pectin Oral Cap Take 1 capsule by mouth daily.      B Complex Vitamins (VITAMIN B COMPLEX OR) Inject as directed.      Ascorbic Acid (VITAMIN C OR) Take by mouth.      MAGNESIUM OR Take by mouth.      omeprazole 20 MG Oral Capsule Delayed Release Take one capsule (20 mg total) by mouth once daily, 30 minutes prior to breakfast. 90 capsule 3    NATURAL PSYLLIUM FIBER OR Take by mouth daily.      fexofenadine-pseudoephedrine ER  MG Oral Tablet 12 Hr Take 1 tablet orally once a day at night.      simvastatin 10 MG Oral Tab Take 1 tablet (10 mg total) by mouth nightly.      Metoprolol Succinate ER 25 MG Oral Tablet 24 Hr Take 0.5 tablets (12.5 mg total) by mouth daily.      lisinopril 10 MG Oral Tab Take 1 tablet (10 mg total) by mouth daily.      fluticasone propionate 50 MCG/ACT Nasal Suspension 2 sprays by Nasal route daily.      Meloxicam 7.5 MG Oral Tab as needed.       Past Medical History:    Anxiety    when mother was dx and  of pancreatic CA    Back pain    Bloating    Change in hair    Constipation    Diabetes (HCC)    Essential hypertension    Fibroids    Flatulence/gas pain/belching    Frequent urination    Headache disorder    Heart palpitations    High blood pressure    High cholesterol    Hyperlipidemia    Indigestion    Leaking of urine    Migraines    Nausea    Painful swallowing    Pap smear for cervical cancer screening    a year and half ago    Visual impairment    contacts/glasses    Wears glasses     Family History   Problem Relation Age of Onset    Hypertension Mother     Cancer Mother 38        Uterine, Pancreatic    Colon Polyps Mother     Diabetes Father     Heart Attack Father     Other  (prediabetes) Brother     Heart Disorder Maternal Grandmother     Heart Disorder Maternal Grandfather     Heart Disorder Paternal Grandmother     Other (Other) Paternal Grandfather     Uterine Cancer Maternal Aunt     Ovarian Cancer Maternal Aunt 45    Uterine Cancer Paternal Aunt     Breast Cancer Maternal Cousin Female 38    Breast Cancer Paternal Cousin Female 48    Cerebral Palsy Daughter      Social history: Reviewed.      ROS/Exam    REVIEW OF SYSTEMS: Ten point review of systems has been performed and is otherwise negative and/or non-contributory, except as described above.     VITALS  Vitals:    09/26/24 1028   BP: 128/84   Pulse: 73   SpO2: 98%   Weight: 128 lb (58.1 kg)   Height: 5' 6\" (1.676 m)       Wt Readings from Last 6 Encounters:   09/26/24 128 lb (58.1 kg)   08/09/24 128 lb 9.6 oz (58.3 kg)   05/15/24 137 lb 6.4 oz (62.3 kg)   02/29/24 144 lb 9.6 oz (65.6 kg)   02/22/24 144 lb (65.3 kg)   12/27/23 145 lb (65.8 kg)       PHYSICAL EXAM  CONSTITUTIONAL:  awake, alert, cooperative, no apparent distress, and appears stated age Vss stable   PSYCH: normal affect  LUNGS: breathing comfortably  CARDIOVASCULAR:  regular rate   NECK:  no palpable thyroid nodules   Musculoskeletal:  Diabetes foot exam:   Good foot hygiene.   Bilateral barefoot skin diabetic exam: normal.  Visualized feet and the appearance : normal.  Bilateral monofilament/sensation of both feet is normal. Vibration to dorsum to the first toe perceived.   Bilateral 2+ pedal pulse pedal pulse exam       Labs/Imaging:   Lab Results   Component Value Date    CHOLEST 154 09/26/2024    CHOLEST 143 03/07/2024    TRIG 125 09/26/2024    TRIG 75 03/07/2024    HDL 54 09/26/2024    HDL 57 03/07/2024    LDL 78 09/26/2024    LDL 71 03/07/2024     No results found for: \"MICROALBCREA\"   Lab Results   Component Value Date    CREATSERUM 0.78 07/25/2024    CREATSERUM 0.78 03/07/2024    EGFRCR 90 07/25/2024    EGFRCR 90 03/07/2024     Lab Results   Component  Value Date    AST 21 07/25/2024    AST 18 03/07/2024    ALT 24 07/25/2024    ALT 22 03/07/2024       Lab Results   Component Value Date    TSH 1.150 03/07/2024    TSH 1.580 09/20/2023    T4F 1.0 03/07/2024       Overall glucose control:  Lab Results   Component Value Date    A1C 5.8 (A) 09/26/2024    A1C 6.5 (H) 03/07/2024       Supplementary Documentation:   -Surveillance for Diabetes Complications & Risks  Foot exam/neuropathy: Last Foot Exam: 09/26/24    Retinopathy screening: No data recordedNo data recorded  [facility] in [place], [place] - summer of 2024    Assessment & Plan:     ICD-10-CM    1. Type 2 diabetes mellitus without complication, without long-term current use of insulin (HCC)  E11.9 POC Hemoglobin A1C     C-Peptide     GLUC MNTR CONT REC FROM NTRSTL TISS FLU PHYS I&R      2. Primary hypertension  I10       3. Mixed hyperlipidemia  E78.2 Lipid Panel      4. BMI 20.0-20.9, adult  Z68.20           Jennifer Freeman is a pleasant 53 year old female here for evaluation of:    #Diabetes- PMHx of Type 2 diabetes mellitus diagnosed in March, 2024.  Last A1c value was 5.8% done 9/26/2024, at goal. Reviewed corin Date: 9/13-9/26: %, GMI 6.4% ,  low 0% , very low 0%, SD 8.4mg/dl, Sensor usage: 92%, at goal as well. Pt has not taken her MF in the last 1.5 months.   Goal <7%. Importance of better glucose control in preventing onset/progression of end-organ damage discussed, as well as goals of therapy and clinical significance of A1C. Checking c peptide, and if present, will recommend SGLT2 I on next visit.      - med changes:  - Can take Metformin as needed,  if glucose is above 130 fasting, take the Metformin  500 mg, for now do not take it.   - - check glucose once a day: fasting or at bedtime  - Targeted fasting glucose (at least 8-10 hrs of no food/sweetened beverage intake)< 120-130 and 2 hrs after lunch or dinner< 180  and prior lunch or dinner <140  -  Follow 15g/15 min rule if you develop any  hypoglycemia symptoms w/ glucose <70  but if glucose <55 follow 30g/15 min rule. Once glucose above 80, eat a protein or food w protein.   - repeat A1c in 3 months   - Discussed Balanced diet: carbohydrates, protein, healthy fats and fiber in each meal. Exercise of at least 150 mins each week. Decrease your simple carbohydrates intake such as sweets: cookies, soda, candy, white rice, white pasta, syrups  - continue Freestyle Sreekanth 3 CGM with phone (connected to clinic profile)  - the patient is not on insulin and does not have history of recurrent hypoglycemia, CGM ordered, likely will have to pay out of pocket for CGM  - continue to check BG 1-2x/day using glucometer or CGM  - meet with endo DM educator re:6 weeks Video visit to check CGM and see if needed to increase Metformin   - no strict contraindication for metformin   -See above header \"Supplementary Documentation\" for surveillance for diabetes complications & risks  - Will recommend to pt to see ophthalmology on next visit.       #Nephropathy screening:  Lab Results   Component Value Date    EGFRCR 90 07/25/2024      #HTN: SBP is to goal <130   BP Readings from Last 1 Encounters:   09/26/24 128/84   BP Meds: lisinopril Tabs - 10 MG; metoprolol succinate ER Tb24 - 25 MG     #Hyperlipidemia/Lipids: LDL is not to goal ,  - Discussed decrease saturated fat, trans fat, and cholesterol intake  example: Butter, lard, shortening, coconut, coconut oil, palm oil, fried food, bologna,pepperoni, salami, egg yolks, Poultry skin, visible meat fat.  - Increase non-starchy fiber intake: aspararagus, carrots, broccoli, [place] sprouts, baby corn, eggplant, cabbage, tomatoes, salad greens  - repeating lipid, to be done about a week prior 3 months f/u   Lab Results   Component Value Date    LDL 78 09/26/2024    TRIG 125 09/26/2024   Cholesterol Meds: simvastatin Tabs - 10 MG      #BMI 20  normal BMI, continue balanced diet and exercise.      Return in about 3 months (around  12/26/2024).    Total time spent  45 minutes today on obtaining history, reviewing pertinent labs, evaluating patient, providing multiple treatment options, reinforcing diet/exercise and compliance, and completing documentation, of which greater than 50% was spent in face to face discussion with the patient   who demonstrated understanding and agreement with plan.     Thank you for allowing me to participate in the care of this patient.  Please feel free to contact me with any questions.    RONI Hdez, [facility]  Endocrinology, Diabetes & Metabolism   09/26/24    In reviewing this note, please be advised that Dragon Voice Recognition software used to dictate the note may have made errors in recognizing some of the words or phrases.     Note to patient: The 21 Century Cures Act makes medical notes like these available to patients in the interest of transparency. However, be advised this is a medical document. It is intended as peer to peer communication. It is written in medical language and may contain abbreviations or verbiage that are unfamiliar. It may appear blunt or direct. Medical documents are intended to carry relevant information, facts as evident, and the clinical opinion of the practitioner.

## 2024-09-27 LAB — C-PEPTIDE: 1.8 NG/ML

## 2024-10-15 ENCOUNTER — TELEPHONE (OUTPATIENT)
Age: 54
End: 2024-10-15

## 2024-10-17 DIAGNOSIS — E11.9 TYPE 2 DIABETES MELLITUS WITHOUT COMPLICATION, WITHOUT LONG-TERM CURRENT USE OF INSULIN (HCC): Primary | ICD-10-CM

## 2024-10-17 NOTE — TELEPHONE ENCOUNTER
Pls tell pt to take MF only if fasting is above 180 for now.   Pls remind him that when glucose goes up at 80 after rescuing a hypoglycemia even w/ 15/15 rule to follow up w/ protein or meal to prolong the elevation of the glucose and avoid rebound down.  If glucose is below 55, use 30 g of carbs/15 mins rule.     Signed order, closing encounter.

## 2024-10-17 NOTE — TELEPHONE ENCOUNTER
HYPOGLYCEMIA TRIAGE    RN spoke with patient regarding current reported HYPOglycemia.     When did the more frequent hypoglycemia start? Started 2 nights ago.    Has the patient recently STOPPED steroid treatment? No    RN Confirms patient is currently taking all of the below listed diabetes medications:  Only takes Metformin if fasting sugars are above 130, denies taking Metformin this week. She last took this 10/13    How is patient checking blood glucose? Sreekanth CGM  Has patient used their glucagon to treat a recent low? : No  Has patient called 911 for a recent low? No    Is the low symptomatic? No  If using CGM did they double check low value with glucometer: Patient uses CGM, did NOT double check with glucometer Since lows are NOT symptomatic, RN advises patient double check next low and call clinic back if accurate.  No further action needed.  Closing Encounter.  If using Glucometer:  Not applicable, patient using CGM     Treatment: chocolate, bread with sugar, water    RN reviewed 15/15 rule with patient to verbalized understanding. Gave examples of fast acting carbs.    Reviewed that if CGM is dinging low and patient is asymptomatic, patient should double check with finger stick to confirm the low.     Patient is out of test strips- has Contour Next EZ machine at home. Pended prescription for review.     Yuliana report in G drive for review.     Keenan CERON RN  10/17/2024

## 2024-11-13 NOTE — PATIENT INSTRUCTIONS
It was great meeting with you today!   Goals  Try to add fat and protein to your meals  You can have 45 grams of carbs at meals which equals 1-2 large tortillas or 3 small ones, 1 cup or beans or rice  When you have a low blood sugar over night double check with your finger meter and then treat a low blood sugar if your reading is below 70  I would recommend increasing exercise     Blood Glucose Goals  Less than 130 mg/dl in the morning  Less than 180 mg/dl 2 hours after meals.  (You and your doctor might have discussed more specific goals)    Treating low blood sugars and high blood sugars  Lows: Use the rule of 15. If feeling shaky, sweaty, weak or dizzie, check blood sugars. If below 70,eat or drink 15 grams of fast acting carbohydrates. This includes juice, hard candy like starburst skittles or smarties, honey, rasins or glucose tabs. Wait 15 minutes and check blood sugars again. If low treat again. If over 70 and going to eat within an hour, do nothing. If over 70 and it will be over an hour until you eat again eat something with a protein.  Highs: Less than 250, drink water and move your body. If over 250 for more than 3 hours or experiencing  nausea, vomiting, stomach pain, confusion or stomach pain call our office at [phone] or go to the ER.     Lila Kim, CARMEN, , LD, CDCES(she/her/hers)  Diabetes Educator  [facility]  [email or web address]  [phone] phone  [phone] Fax

## 2024-11-13 NOTE — PROGRESS NOTES
Jennifer Freeman  (10/7/1970) attended Diabetes Education.    Referring Provider: Natali Lovett,         Date: 2024  Start time: 9:30am End time: 10:30am  _______________________________________________     Ms. Freeman is a 54 year old female who presents today with Type II diabetes diagnosed in May 2024.    Reason for visit: Overnight low blood sugars    What has your diabetes journey been? Scared to eat carbohydrates after meeting with PCP, metformin caused weight loss. She has lost 20 lbs in the last 5 months. 13% weight loss which qualifies as severe malnutrition. We discussed strategies for weight gain including healthy fats and protein. We discussed if she feels low to double check BG with glucometer then treat if blood sugar is below 70. Also reviewed CGMs being 15 minutes behind glucometer.     Prev diab edu? Step 1 class      Assessment:     Wt Readings from Last 6 Encounters:   24 128 lb   24 128 lb 9.6 oz   05/15/24 137 lb 6.4 oz   24 144 lb 9.6 oz   24 144 lb   23 145 lb            Lab Results   Component Value Date    A1C 5.8 (A) 2024    A1C 6.5 (H) 2024        Medical History:   Past Medical History:    Anxiety    when mother was dx and  of pancreatic CA    Back pain    Bloating    Change in hair    Constipation    Diabetes (HCC)    Essential hypertension    Fibroids    Flatulence/gas pain/belching    Frequent urination    Headache disorder    Heart palpitations    High blood pressure    High cholesterol    Hyperlipidemia    Indigestion    Leaking of urine    Migraines    Nausea    Painful swallowing    Pap smear for cervical cancer screening    a year and half ago    Visual impairment    contacts/glasses    Wears glasses         Medications:     Current DM management:     Oral:  Metformin  mg not taking          Monitoring Blood Glucose:     Continuous Glucose Monitor:   Has a BG meter and is checking a few times per week    Reviewed CGMS download and  patient logs:    CGM download:  Period of 11/1/24-11/14/24  Sensor type: Freestyle Sreekanth 3 using 2 right now because [employer] out of 3s  Days of sensor use: 90%  Standard deviation/Coefficient of variation: 10.7  Glucose Management indicator (GMI): 6%    Average glucose (mg/dL): 111     Glucose Time in Range:  0%  Very high glucose targets: (> 250mg/dl)    0% High glucose targets: (> 180mg/dl)    100% In target range:  (70-180mg/dl)  0% Low glucose targets:  (less than 70mg/dl)    0% Very Low glucose targets: (< 54mg/dl )    Pattern of hypoglycemia overnight pattern observed   Pattern of hyperglycemia  CGM report reviewed/printed and submitted to be scanned into chart.       Healthy Eating & Exercise:     Diet History:  Usually eats 3 meals/day and 1 snacks/day.      TYPICAL  FOOD  NOTES   Breakfast  oatmeal or egg and veggies or yogurt         Lunch  cheese tortilla         Dinner  soup         Snacks  cheese, fruit, almonds         Beverages  chau luciano         Eating out  max 1x per week           Instructed on basic diet guidelines including aiming for 3 meals/day, balancing each meal with variety of nutrients, using plate method as a model for meals - goal of 1/2 plate non-starchy vegetables, 1/4 plate lean proteins, 1/4 plate carbohydrates and modest portions of fruit, yogurt, milk if desired.    We reviewed impact of carbohydrates, fiber, protein, and fat on glucose levels and trends. We discussed balancing meals and snacks with a combination of carbohydrates, proteins, and fiber to help stabilize glucose levels.     We discussed aiming for consistent meal times/carb amounts can help us maintain blood sugars. Reviewed carbohydrate counting, goals for meals (45 grams per meal) and snacks (15 grams per snack). Reviewed that carb counting consistently for a week or so can help regain control over glucose.    Carbohydrate counting seven recommendations provided. Carb \"Cheat Sheet\" provided for quick references.      Physical Activity:  Currently exercising: Yes: walking  Walking- used to walk 4-6 miles daily. Encouraged increasing to this amount again and adding in weights if able    Sleep Patterns: good    Lifestyle & Healthy Coping:     Discussed healthy coping options, resources available, and diabetes distress. Discussed signs of diabetes distress and actions to take at that time. Discussed ways to prevent diabetes distress.       Education:     Diabetes Overview  Reviewed diabetes pathophysiology.  Discussed current A1c result and goals.   Reviewed signs, symptoms, prevention, and treatment of hyperglycemia and hypoglycemia.    Healthy Eating  Reviewed basic nutrition concepts for diabetes management.  Reviewed food groups and impact on blood glucose levels.  Discussed MyPlate Method of balancing diet.  Reviewed carbohydrate counting and label reading.  Reviewed heart healthy diet (low fat, low saturated fat, high fiber, reduced sodium).    Being Active  Benefits and effect of activity on blood glucose discussed.  Reviewed when to call MD and benefits of goal setting.    Monitoring  Reinforced glucose monitoring schedules: via CGM double check low alarms with glucometer  Discussed use of personal CGM and benefits.    Taking Medication  Reviewed medication use, action, timing, and side effects.   Injectables: Site selection, rotation, action, timing reviewed.     Reducing Risk  Reviewed overview of complications including: CV health    Healthy Coping  Family involvement/support encouraged  Depression and diabetes reviewed.      Goals:     Practice healthful eating patterns, emphasizing a variety of nutrient dense foods in appropriate portion sizes.    Monitor carbohydrate intake with the MyPlate method.  Practice carbohydrate counting and label reading.  Increase or continue physical activity of at least 150 mins, over at least 3 days a week or per doctors recommendations.   Checking blood glucose with glucose meter as  prescribed by provider, 45 times per day (15).  Using CGM for detailed blood glucose monitoring and management.   We discussed strategies for weight gain including healthy fats and protein. We discussed if she feels low to double check BG with glucometer then treat if blood sugar is below 70. Also reviewed CGMs being 15 minutes behind glucometer.       Blood Glucose Goals  Blood sugar goals: less than 130 mg/dl in the morning (fasting) and less than 180 mg/dl 2 hours after meals.  Keep glucose tabs or fast acting carbohydrates with you for treatment of lows; for blood glucose levels less than 70 mg/dl, take 15 grams of fast acting carbohydrates (3-4 glucose tabs, 4 ounces of juice, or as discussed). Retest in 15 min. If not above 70 mg/dl, retreat.    Recommendations:     Patient goals:  Try to add fat and protein to your meals  You can have 45 grams of carbs at meals which equals 1-2 large tortillas or 3 small ones, 1 cup or beans or rice  When you have a low blood sugar over night double check with your finger meter and then treat a low blood sugar if your reading is below 70  I would recommend increasing exercise     Continue to use the Plate Method as a model for your meals:  1/2 of your plate is non-starchy vegetables, 1/4 of your plate is lean protein, 1/4 of your plate is starchy or carbohydrate foods     Check your blood sugars when CGM gives a low alarm and you feel normal.        Blood glucose guidelines/goals:     ADA (American Diabetes Association):  A1c:  7% or less  Fasting Blood Glucose (before you eat breakfast):    2 Hours After a Meal:  Less than 180     Your doctor may give more specific goals for you.     Recommend follow up with diabetes educator in 3 months. Bring your glucose log book with you to your visits.     Follow up:   Future Appointments   Date Time Provider Department Center   12/26/2024  9:30 AM Natali Jolley APRN [facility]   2/13/2025  9:30 AM Lila Kim RD  [facility]         Patient verbalized understanding and has no further questions at this time.    Lila Kim, RD, , LD, CDCES

## 2024-11-14 ENCOUNTER — DIABETIC EDUCATION (OUTPATIENT)
Age: 54
End: 2024-11-14
Payer: COMMERCIAL

## 2024-11-14 ENCOUNTER — TELEPHONE (OUTPATIENT)
Age: 54
End: 2024-11-14

## 2024-11-14 DIAGNOSIS — E11.9 TYPE 2 DIABETES MELLITUS WITHOUT COMPLICATION, UNSPECIFIED WHETHER LONG TERM INSULIN USE (HCC): Primary | ICD-10-CM

## 2025-02-03 ENCOUNTER — OFFICE VISIT (OUTPATIENT)
Dept: FAMILY MEDICINE CLINIC | Facility: CLINIC | Age: 55
End: 2025-02-03
Payer: COMMERCIAL

## 2025-02-03 VITALS
RESPIRATION RATE: 20 BRPM | BODY MASS INDEX: 22 KG/M2 | OXYGEN SATURATION: 98 % | WEIGHT: 133.63 LBS | HEART RATE: 77 BPM | TEMPERATURE: 99 F | SYSTOLIC BLOOD PRESSURE: 118 MMHG | DIASTOLIC BLOOD PRESSURE: 80 MMHG

## 2025-02-03 DIAGNOSIS — J02.9 SORE THROAT: Primary | ICD-10-CM

## 2025-02-03 LAB
KIT LOT #: NORMAL NUMERIC
STREP GRP A CUL-SCR: NEGATIVE

## 2025-02-03 PROCEDURE — 87637 SARSCOV2&INF A&B&RSV AMP PRB: CPT | Performed by: NURSE PRACTITIONER

## 2025-02-03 NOTE — PROGRESS NOTES
CHIEF COMPLAINT:     Chief Complaint   Patient presents with    Sore Throat     S/s for 1 day.  OTC meds taken.          HPI:   Jennifer Freeman is a 54 year old female presents to clinic with complaint of sore throat. Patient has had 1 days. Symptoms have been cosistent since onset.  Patient reports following associated symptoms: sore throat. Patient denies headache. Patient denies nausea.  Patient denies rash. Patient reports history of strep throat. Patient denies strep pharyngitis exposure. Treating symptoms with OTC meds.    Current Outpatient Medications   Medication Sig Dispense Refill    Glucose Blood (CONTOUR NEXT TEST) In Vitro Strip Use to test once daily. 100 each 1    montelukast 10 MG Oral Tab Take 1 tablet (10 mg total) by mouth nightly.      fluticasone propionate 50 MCG/ACT Nasal Suspension 2 sprays by Nasal route daily.      oxyCODONE 5 MG Oral Tab Take 1 tablet (5 mg total) by mouth every 6 (six) hours as needed for Pain. 20 tablet 0    senna-docusate 8.6-50 MG Oral Tab Take 1 tablet by mouth daily. 30 tablet 0    acidophilus-pectin Oral Cap Take 1 capsule by mouth daily.      B Complex Vitamins (VITAMIN B COMPLEX OR) Inject as directed.      Ascorbic Acid (VITAMIN C OR) Take by mouth.      MAGNESIUM OR Take by mouth.      Meloxicam 7.5 MG Oral Tab as needed.      omeprazole 20 MG Oral Capsule Delayed Release Take one capsule (20 mg total) by mouth once daily, 30 minutes prior to breakfast. 90 capsule 3    NATURAL PSYLLIUM FIBER OR Take by mouth daily.      fexofenadine-pseudoephedrine ER  MG Oral Tablet 12 Hr Take 1 tablet orally once a day at night.      simvastatin 10 MG Oral Tab Take 1 tablet (10 mg total) by mouth nightly.      Metoprolol Succinate ER 25 MG Oral Tablet 24 Hr Take 0.5 tablets (12.5 mg total) by mouth daily.      lisinopril 10 MG Oral Tab Take 1 tablet (10 mg total) by mouth daily.        Past Medical History:    Anxiety    when mother was dx and  of pancreatic CA     Back pain    Bloating    Change in hair    Constipation    Diabetes (HCC)    Essential hypertension    Fibroids    Flatulence/gas pain/belching    Frequent urination    Headache disorder    Heart palpitations    High blood pressure    High cholesterol    Hyperlipidemia    Indigestion    Leaking of urine    Migraines    Nausea    Painful swallowing    Pap smear for cervical cancer screening    a year and half ago    Visual impairment    contacts/glasses    Wears glasses      Social History:  Social History     Socioeconomic History    Marital status:    Tobacco Use    Smoking status: Never    Smokeless tobacco: Never   Vaping Use    Vaping status: Never Used   Substance and Sexual Activity    Alcohol use: Not Currently     Comment: socially    Drug use: No    Sexual activity: Yes     Partners: Male   Other Topics Concern    Caffeine Concern Yes     Comment: 1 cup coffee per day    Exercise Yes     Comment: 2-3 x week, walking    Seat Belt Yes     Social Drivers of Health     Financial Resource Strain: Low Risk  (8/5/2024)    Received from [facility]    Overall Financial Resource Strain (CARDIA)     Difficulty of Paying Living Expenses: Not very hard   Food Insecurity: Food Insecurity Present (8/5/2024)    Received from [facility]    Hunger Vital Sign     Worried About Running Out of Food in the Last Year: Never true     Ran Out of Food in the Last Year: Sometimes true   Transportation Needs: No Transportation Needs (8/5/2024)    Received from [facility]    PRAPARE - Transportation     Lack of Transportation (Medical): No     Lack of Transportation (Non-Medical): No   Physical Activity: Sufficiently Active (11/19/2019)    Received from [facility]    Exercise Vital Sign     Days of Exercise per Week: 7 days     Minutes of Exercise per Session: 30 min   Housing Stability: Unknown (8/5/2024)    Received from  [facility]    Housing Stability Vital Sign     Unable to Pay for Housing in the Last Year: No     Unstable Housing in the Last Year: No        REVIEW OF SYSTEMS:   GENERAL HEALTH: no change in appetite  SKIN: Per HPI  HEENT: denies ear pain, See HPI  RESPIRATORY: denies shortness of breath or wheezing  CARDIOVASCULAR: denies chest pain or palpitations   GI: denies vomiting or diarrhea  NEURO: denies dizziness or lightheadedness    EXAM:   /80   Pulse 77   Temp 98.9 °F (37.2 °C) (Oral)   Resp 20   Wt 133 lb 9.6 oz (60.6 kg)   LMP 04/25/2022 (Approximate)   SpO2 98%   BMI 21.56 kg/m²   GENERAL: well developed, well nourished,in no apparent distress  SKIN: no rashes,no suspicious lesions  HEAD: atraumatic, normocephalic  EYES: conjunctiva clear, EOM intact  EARS: TM's clear, non-injected, no bulging, retraction, or fluid bilaterally  NOSE: nostrils patent, no nasal discharge, nasal mucosa not inflamed  THROAT: oral mucosa pink, moist. Posterior pharynx not erythematous and injected. no exudates. Tonsils 1/4.  Breath is not malodorous   NECK: supple  LUNGS: clear to auscultation bilaterally, no wheezes or rhonchi. Breathing is non labored.  CARDIO: RRR without murmur  GI: good BS's,no masses, hepatosplenomegaly, or tenderness on direct palpation  EXTREMITIES: no cyanosis, clubbing or edema  LYMPH: no anterior cervical. no submandibular lymphadenopathy.  No posterior cervical or occipital lymphadenopathy.    Recent Results (from the past 24 hours)   Rapid Strep    Collection Time: 02/03/25  9:34 AM   Result Value Ref Range    Strep Grp A Screen Negative Negative    Control Line Present with a clear background (yes/no) Mirtha Yes/No    Kit Lot # [id number] Numeric    Kit Expiration Date 12/20/2025 Date         ASSESSMENT AND PLAN:   Assessment:   Encounter Diagnosis   Name Primary?    Sore throat Yes       Plan: Discussed that due to symptoms and negative rapid strep this is most likely viral  and does not require antibiotics. Will not send throat culture as discussed.  Quad panel sent per patient request.       Discussed possibility of mononucleosis infection, but at this time symptoms are not reflective of mono infection.  If s/sx persist will consider MonoSpot or further lab work.     Comfort measures explained and discussed as listed in Patient Instructions    Follow up with PCP in 3-5 days if not improving, condition worsens, or fever greater than or equal to 100.4 persists for 72 hours.    The patient/parent indicates understanding of these issues and agrees to the plan.    There are no Patient Instructions on file for this visit.

## 2025-02-04 LAB
FLUAV + FLUBV RNA SPEC NAA+PROBE: DETECTED
FLUAV + FLUBV RNA SPEC NAA+PROBE: NOT DETECTED
RSV RNA SPEC NAA+PROBE: NOT DETECTED
SARS-COV-2 RNA RESP QL NAA+PROBE: NOT DETECTED

## 2025-02-13 ENCOUNTER — DIABETIC EDUCATION (OUTPATIENT)
Facility: CLINIC | Age: 55
End: 2025-02-13
Payer: COMMERCIAL

## 2025-02-13 DIAGNOSIS — E11.9 TYPE 2 DIABETES MELLITUS WITHOUT COMPLICATION, UNSPECIFIED WHETHER LONG TERM INSULIN USE (HCC): Primary | ICD-10-CM

## 2025-02-13 NOTE — PROGRESS NOTES
Jennifer Freeman  (10/7/1970) attended Diabetes Education.    Referring Provider: Natali Jolley,         Date: 25  Start time: 10:00am End time: 10:20am  _______________________________________________     Ms. Freeman is a 54 year old female who presents today with Type II diabetes diagnosed in May 2024.    Reason for visit: Overnight low blood sugars- follow up     What has your diabetes journey been? She has been doing well in interim since last appointment. Blood sugars well controlled. She has noticed if she eats a bigger dinner meal her blood sugar will be higher the next morning. She has been exercising and holding metformin.     Prev diab edu? Step 1 class      Assessment:     Wt Readings from Last 6 Encounters:   25 133 lb 9.6 oz   24 128 lb   24 128 lb 9.6 oz   05/15/24 137 lb 6.4 oz   24 144 lb 9.6 oz   24 144 lb            Lab Results   Component Value Date    A1C 5.8 (A) 2024    A1C 6.5 (H) 2024        Medical History:   Past Medical History:    Anxiety    when mother was dx and  of pancreatic CA    Back pain    Bloating    Change in hair    Constipation    Diabetes (HCC)    Essential hypertension    Fibroids    Flatulence/gas pain/belching    Frequent urination    Headache disorder    Heart palpitations    High blood pressure    High cholesterol    Hyperlipidemia    Indigestion    Leaking of urine    Migraines    Nausea    Painful swallowing    Pap smear for cervical cancer screening    a year and half ago    Visual impairment    contacts/glasses    Wears glasses         Medications:     Current DM management:     Oral:  Metformin  mg not taking          Monitoring Blood Glucose:     Continuous Glucose Monitor:   Has a BG meter and is checking a few times per week    Reviewed CGMS download and patient logs:            CGM report reviewed/printed and submitted to be scanned into chart.       Healthy Eating & Exercise:     Diet History:  Usually eats 3  meals/day and 1 snacks/day.      TYPICAL  FOOD  NOTES   Breakfast  oatmeal or egg and veggies or yogurt         Lunch  cheese tortilla         Dinner  soup         Snacks  cheese, fruit, almonds         Beverages  chau luciano         Eating out  max 1x per week           Instructed on basic diet guidelines including aiming for 3 meals/day, balancing each meal with variety of nutrients, using plate method as a model for meals - goal of 1/2 plate non-starchy vegetables, 1/4 plate lean proteins, 1/4 plate carbohydrates and modest portions of fruit, yogurt, milk if desired.    We reviewed impact of carbohydrates, fiber, protein, and fat on glucose levels and trends. We discussed balancing meals and snacks with a combination of carbohydrates, proteins, and fiber to help stabilize glucose levels.     We discussed aiming for consistent meal times/carb amounts can help us maintain blood sugars. Reviewed carbohydrate counting, goals for meals (45 grams per meal) and snacks (15 grams per snack). Reviewed that carb counting consistently for a week or so can help regain control over glucose.    Carbohydrate counting seven recommendations provided. Carb \"Cheat Sheet\" provided for quick references.     Physical Activity:  Currently exercising: Yes: walking and doing squats  If notices blood sugars going up after a meal she will walk or do squats    Sleep Patterns: good    Lifestyle & Healthy Coping:     Discussed healthy coping options, resources available, and diabetes distress. Discussed signs of diabetes distress and actions to take at that time. Discussed ways to prevent diabetes distress.       Education:     Diabetes Overview  Reviewed diabetes pathophysiology.  Discussed current A1c result and goals.   Reviewed signs, symptoms, prevention, and treatment of hyperglycemia and hypoglycemia.    Healthy Eating  Reviewed basic nutrition concepts for diabetes management.  Reviewed food groups and impact on blood glucose  levels.  Discussed MyPlate Method of balancing diet.  Reviewed carbohydrate counting and label reading.  Reviewed heart healthy diet (low fat, low saturated fat, high fiber, reduced sodium).    Being Active  Benefits and effect of activity on blood glucose discussed.  Reviewed when to call MD and benefits of goal setting.    Monitoring  Reinforced glucose monitoring schedules: via CGM double check low alarms with glucometer  Discussed use of personal CGM and benefits.    Taking Medication  Reviewed medication use, action, timing, and side effects.   Injectables: Site selection, rotation, action, timing reviewed.     Reducing Risk  Reviewed overview of complications including: CV health    Healthy Coping  Family involvement/support encouraged  Depression and diabetes reviewed.      Goals:     Practice healthful eating patterns, emphasizing a variety of nutrient dense foods in appropriate portion sizes.    Monitor carbohydrate intake with the MyPlate method.  Practice carbohydrate counting and label reading.  Increase or continue physical activity of at least 150 mins, over at least 3 days a week or per doctors recommendations.   Checking blood glucose with glucose meter as prescribed by provider, 45 times per day (15).  Using CGM for detailed blood glucose monitoring and management.   We discussed strategies for weight gain including healthy fats and protein. We discussed if she feels low to double check BG with glucometer then treat if blood sugar is below 70. Also reviewed CGMs being 15 minutes behind glucometer.       Blood Glucose Goals  Blood sugar goals: less than 130 mg/dl in the morning (fasting) and less than 180 mg/dl 2 hours after meals.  Keep glucose tabs or fast acting carbohydrates with you for treatment of lows; for blood glucose levels less than 70 mg/dl, take 15 grams of fast acting carbohydrates (3-4 glucose tabs, 4 ounces of juice, or as discussed). Retest in 15 min. If not above 70 mg/dl,  retreat.    Recommendations:     Patient goals:  Great job managing your blood sugars!   I love the exercising after meals and trying to eat 45 grams of carbs at meals  Great job confirming low blood sugar alarms with your BG meter  If you want to have a glass of wine on occasion with dinner that is okay. Depending on the sweetness of the wine it will have 4-5 g of carbs. I would drink only if you are eating and be aware of risk of low blood sugars after drinking alcohol.     Continue to use the Plate Method as a model for your meals:  1/2 of your plate is non-starchy vegetables, 1/4 of your plate is lean protein, 1/4 of your plate is starchy or carbohydrate foods     Check your blood sugars when CGM gives a low alarm and you feel normal.        Blood glucose guidelines/goals:     ADA (American Diabetes Association):  A1c:  7% or less  Fasting Blood Glucose (before you eat breakfast):    2 Hours After a Meal:  Less than 180     Your doctor may give more specific goals for you.     Recommend follow up with diabetes educator in 3 months. Bring your glucose log book with you to your visits.     Follow up:   Future Appointments   Date Time Provider Department Center   3/18/2025  8:15 AM Natali Jolley APRN [facility]   4/3/2025  9:30 AM Kathy Borjas MD [facility] [address]             Patient verbalized understanding and has no further questions at this time.    Lila Kim, RD, , LD, CDCES

## 2025-02-13 NOTE — PATIENT INSTRUCTIONS
It was great meeting with you today! I am so grateful we were able to meet and talk about your diabetes!  Great job managing your blood sugars!   I love the exercising after meals and trying to eat 45 grams of carbs at meals  Great job confirming low blood sugar alarms with your BG meter  If you want to have a glass of wine on occasion with dinner that is okay. Depending on the sweetness of the wine it will have 4-5 g of carbs. I would drink only if you are eating and be aware of risk of low blood sugars after drinking alcohol.     Blood Glucose Goals  Between  mg/dl in the morning  Less than 180 mg/dl 2 hours after meals.  (You and your doctor might have discussed more specific goals)    Treating low blood sugars and high blood sugars  Always check your blood sugar if you feel any symptoms of a low blood sugar.      If you have a low blood sugar (less than 70 mg/dL) follow the \"Rule of 15\" to treat it:  1.) Take 15 grams of a quick acting carbohydrate (1 tablespoon of honey, 3-4 glucose tablets, 1/2 cup fruit juice, 1/2 can regular soda, or to-go pouch of applesauce). Only eat ONE 15g SERVING of a quick acting carbohydrate.  2.) Then check your blood sugar again after 15 minutes of drinking or eating the quick acting carbohydrate to be sure your blood sugar is above 70 mg/dL.   3.) If your blood sugar is still less than 70 mg/dL, repeat treatment of 15 grams of a quick acting carbohydrate (1 tablespoon of honey, 3-4 glucose tablets, 1/2 cup fruit juice, 1/2 can regular soda, or to-go pouch  of applesauce).  4.) If your next meal is more than one hour away, eat a small snack. Try to have some protein and complex carbohydrate (peanut butter crackers, pretzels and cheese, etc).   5.) If you are not sure what caused your low blood sugar, call your healthcare provider.  6.) Always check your blood sugar before you drive.     _______________________________________________       To treat a low, we recommend you  carry with you easy, pre-portioned treatment for low blood sugars that are 15G of carbs:   - Children sized squeeze pouch applesauce (high fiber + carbs help prevent too high of a spike)  - Small children's sized juicebox- 15g carb --> 4oz juice box  - Glucose tablets from [employer], you can find them near diabetes supplies --> Note, you will need to eat 3-4 tablets to get to 15g of carbs  - Children sized fruit snack pack- look for one with 15 grams of total carbohydrate     AVOID complex carbs, or foods that contain fats along with carbs (like chocolate) can slow the absorption of glucose and should NOT be used to treat an emergency low.    You are doing amazing! Keep up the great work!    Lila Kim, RD, , LD, CDCES(she/her/hers)  Diabetes Educator  [facility]  [email or web address]  [phone] phone  [phone] Fax

## 2025-02-18 DIAGNOSIS — E11.9 TYPE 2 DIABETES MELLITUS WITHOUT COMPLICATION, WITHOUT LONG-TERM CURRENT USE OF INSULIN (HCC): Primary | ICD-10-CM

## 2025-02-18 NOTE — TELEPHONE ENCOUNTER
Endocrine Refill protocol for metformin    Protocol Criteria:  PASSED  Reason: N/A    If all below requirements are met, send a 90-day supply with 1 refill per provider protocol.     Verify appointment with Endocrinology completed in the last 6 months or scheduled in the next 3 months.  Verify A1C has been completed within the last 6 months and is below 8.5%  Verify last GFR is greater than or equal to 40 in the past 12 months    Last completed office visit:12/26/2024 Natali Jolley APRN   Next scheduled Follow up:   Future Appointments   Date Time Provider Department Center   3/18/2025  8:15 AM Natali Jolley APRN [facility]   4/3/2025  9:30 AM Kathy Borjas MD [facility] [address]       Last GFR result:    Lab Results   Component Value Date    EGFRCR 90 07/25/2024     Last A1c result: Last A1C result: 5.8% done 9/26/2024.

## 2025-03-04 ENCOUNTER — HOSPITAL ENCOUNTER (EMERGENCY)
Age: 55
Discharge: HOME | End: 2025-03-04
Attending: EMERGENCY MEDICINE
Payer: COMMERCIAL

## 2025-03-04 ENCOUNTER — APPOINTMENT (OUTPATIENT)
Dept: CT IMAGING | Age: 55
End: 2025-03-04
Attending: EMERGENCY MEDICINE
Payer: COMMERCIAL

## 2025-03-04 VITALS
RESPIRATION RATE: 16 BRPM | DIASTOLIC BLOOD PRESSURE: 76 MMHG | WEIGHT: 129 LBS | BODY MASS INDEX: 21.49 KG/M2 | SYSTOLIC BLOOD PRESSURE: 122 MMHG | OXYGEN SATURATION: 98 % | TEMPERATURE: 98 F | HEART RATE: 78 BPM | HEIGHT: 65 IN

## 2025-03-04 DIAGNOSIS — J01.90 ACUTE SINUSITIS, RECURRENCE NOT SPECIFIED, UNSPECIFIED LOCATION: ICD-10-CM

## 2025-03-04 DIAGNOSIS — G43.801 OTHER MIGRAINE WITH STATUS MIGRAINOSUS, NOT INTRACTABLE: Primary | ICD-10-CM

## 2025-03-04 LAB
ALBUMIN SERPL-MCNC: 4.3 G/DL (ref 3.2–4.8)
ALBUMIN/GLOB SERPL: 1.4 {RATIO} (ref 1–2)
ALP LIVER SERPL-CCNC: 89 U/L
ALT SERPL-CCNC: 23 U/L
ANION GAP SERPL CALC-SCNC: 7 MMOL/L (ref 0–18)
AST SERPL-CCNC: 23 U/L (ref ?–34)
BASOPHILS # BLD AUTO: 0.04 X10(3) UL (ref 0–0.2)
BASOPHILS NFR BLD AUTO: 0.6 %
BILIRUB SERPL-MCNC: 0.4 MG/DL (ref 0.3–1.2)
BUN BLD-MCNC: 20 MG/DL (ref 9–23)
CALCIUM BLD-MCNC: 9.4 MG/DL (ref 8.7–10.6)
CHLORIDE SERPL-SCNC: 109 MMOL/L (ref 98–112)
CO2 SERPL-SCNC: 25 MMOL/L (ref 21–32)
CREAT BLD-MCNC: 0.71 MG/DL
EGFRCR SERPLBLD CKD-EPI 2021: 100 ML/MIN/1.73M2 (ref 60–?)
EOSINOPHIL # BLD AUTO: 0.16 X10(3) UL (ref 0–0.7)
EOSINOPHIL NFR BLD AUTO: 2.6 %
ERYTHROCYTE [DISTWIDTH] IN BLOOD BY AUTOMATED COUNT: 11.9 %
GLOBULIN PLAS-MCNC: 3 G/DL (ref 2–3.5)
GLUCOSE BLD-MCNC: 112 MG/DL (ref 70–99)
HCT VFR BLD AUTO: 40 %
HGB BLD-MCNC: 13.7 G/DL
IMM GRANULOCYTES # BLD AUTO: 0.02 X10(3) UL (ref 0–1)
IMM GRANULOCYTES NFR BLD: 0.3 %
LYMPHOCYTES # BLD AUTO: 1.86 X10(3) UL (ref 1–4)
LYMPHOCYTES NFR BLD AUTO: 29.9 %
MCH RBC QN AUTO: 30.8 PG (ref 26–34)
MCHC RBC AUTO-ENTMCNC: 34.3 G/DL (ref 31–37)
MCV RBC AUTO: 89.9 FL
MONOCYTES # BLD AUTO: 0.43 X10(3) UL (ref 0.1–1)
MONOCYTES NFR BLD AUTO: 6.9 %
NEUTROPHILS # BLD AUTO: 3.71 X10 (3) UL (ref 1.5–7.7)
NEUTROPHILS # BLD AUTO: 3.71 X10(3) UL (ref 1.5–7.7)
NEUTROPHILS NFR BLD AUTO: 59.7 %
OSMOLALITY SERPL CALC.SUM OF ELEC: 295 MOSM/KG (ref 275–295)
PLATELET # BLD AUTO: 222 10(3)UL (ref 150–450)
POTASSIUM SERPL-SCNC: 3.9 MMOL/L (ref 3.5–5.1)
PROT SERPL-MCNC: 7.3 G/DL (ref 5.7–8.2)
RBC # BLD AUTO: 4.45 X10(6)UL
SODIUM SERPL-SCNC: 141 MMOL/L (ref 136–145)
WBC # BLD AUTO: 6.2 X10(3) UL (ref 4–11)

## 2025-03-04 PROCEDURE — 85025 COMPLETE CBC W/AUTO DIFF WBC: CPT | Performed by: EMERGENCY MEDICINE

## 2025-03-04 PROCEDURE — 96361 HYDRATE IV INFUSION ADD-ON: CPT

## 2025-03-04 PROCEDURE — 99285 EMERGENCY DEPT VISIT HI MDM: CPT

## 2025-03-04 PROCEDURE — 80053 COMPREHEN METABOLIC PANEL: CPT | Performed by: EMERGENCY MEDICINE

## 2025-03-04 PROCEDURE — 96375 TX/PRO/DX INJ NEW DRUG ADDON: CPT

## 2025-03-04 PROCEDURE — 99284 EMERGENCY DEPT VISIT MOD MDM: CPT

## 2025-03-04 PROCEDURE — 70450 CT HEAD/BRAIN W/O DYE: CPT | Performed by: EMERGENCY MEDICINE

## 2025-03-04 PROCEDURE — 96374 THER/PROPH/DIAG INJ IV PUSH: CPT

## 2025-03-04 RX ORDER — BUTALBITAL, ACETAMINOPHEN AND CAFFEINE 50; 325; 40 MG/1; MG/1; MG/1
1-2 TABLET ORAL EVERY 6 HOURS PRN
Qty: 10 TABLET | Refills: 0 | Status: SHIPPED | OUTPATIENT
Start: 2025-03-04 | End: 2025-03-09

## 2025-03-04 RX ORDER — KETOROLAC TROMETHAMINE 15 MG/ML
15 INJECTION, SOLUTION INTRAMUSCULAR; INTRAVENOUS ONCE
Status: COMPLETED | OUTPATIENT
Start: 2025-03-04 | End: 2025-03-04

## 2025-03-04 RX ORDER — FLUTICASONE PROPIONATE 50 MCG
2 SPRAY, SUSPENSION (ML) NASAL DAILY
Qty: 16 G | Refills: 0 | Status: SHIPPED | OUTPATIENT
Start: 2025-03-04 | End: 2025-04-03

## 2025-03-04 RX ORDER — DIPHENHYDRAMINE HYDROCHLORIDE 50 MG/ML
50 INJECTION INTRAMUSCULAR; INTRAVENOUS ONCE
Status: COMPLETED | OUTPATIENT
Start: 2025-03-04 | End: 2025-03-04

## 2025-03-04 RX ORDER — ONDANSETRON 2 MG/ML
4 INJECTION INTRAMUSCULAR; INTRAVENOUS ONCE
Status: COMPLETED | OUTPATIENT
Start: 2025-03-04 | End: 2025-03-04

## 2025-03-04 NOTE — ED INITIAL ASSESSMENT (HPI)
Pt to ED with migraine x1 week, associated with light sensitivity and dizziness. Pt was prescribed ubrelvy a week ago with no improvement. Took rizatriptan around 1530

## 2025-03-05 NOTE — ED PROVIDER NOTES
Patient Seen in: [facility] In [place]      History     Chief Complaint   Patient presents with    Headache     Stated Complaint: migrane x 1 week    Subjective:   HPI      Headache back of head with phpotophobi and nausea and some lightheadedness for over a week - had influenza a februarry 20ish- some left ear fullness and pain and some sinus pressure still       Objective:     Past Medical History:    Anxiety    when mother was dx and  of pancreatic CA    Back pain    Bloating    Change in hair    Constipation    Diabetes (HCC)    Essential hypertension    Fibroids    Flatulence/gas pain/belching    Frequent urination    Headache disorder    Heart palpitations    High blood pressure    High cholesterol    Hyperlipidemia    Indigestion    Leaking of urine    Migraines    Nausea    Painful swallowing    Pap smear for cervical cancer screening    a year and half ago    Visual impairment    contacts/glasses    Wears glasses              Past Surgical History:   Procedure Laterality Date    Anterior repair  2022    Enterocele repair  2022    Hysterectomy      Midurethral sling  2022    Posterior repair  2022    Uterosacral ligament suspension  2022    Vaginal hysterectomy  2022    with bilateral salpingectomy                Social History     Socioeconomic History    Marital status:    Tobacco Use    Smoking status: Never    Smokeless tobacco: Never   Vaping Use    Vaping status: Never Used   Substance and Sexual Activity    Alcohol use: Not Currently     Comment: socially    Drug use: No    Sexual activity: Yes     Partners: Male   Other Topics Concern    Caffeine Concern Yes     Comment: 1 cup coffee per day    Exercise Yes     Comment: 2-3 x week, walking    Seat Belt Yes     Social Drivers of Health     Food Insecurity: Food Insecurity Present (2024)    Received from [facility]    Hunger Vital Sign     Worried About  Running Out of Food in the Last Year: Never true     Ran Out of Food in the Last Year: Sometimes true   Transportation Needs: No Transportation Needs (8/5/2024)    Received from [facility]    PRAPARE - Transportation     Lack of Transportation (Medical): No     Lack of Transportation (Non-Medical): No   Housing Stability: Unknown (8/5/2024)    Received from [facility]    Housing Stability Vital Sign     Unable to Pay for Housing in the Last Year: No     Unstable Housing in the Last Year: No                  Physical Exam     ED Triage Vitals [03/04/25 1568]   /89   Pulse 81   Resp 16   Temp 98.2 °F (36.8 °C)   Temp src Oral   SpO2 98 %   O2 Device None (Room air)       Current Vitals:   Vital Signs  BP: 134/89  Pulse: 81  Resp: 16  Temp: 98.2 °F (36.8 °C)  Temp src: Oral    Oxygen Therapy  SpO2: 98 %  O2 Device: None (Room air)        Physical Exam  Tm's bulging, sinus tenderness to palpation      ED Course     Labs Reviewed   COMP METABOLIC PANEL (14) - Abnormal; Notable for the following components:       Result Value    Glucose 112 (*)     All other components within normal limits   CBC WITH DIFFERENTIAL WITH PLATELET     Had influenza a 3 weeks ago- perfhaps with the sinus pressure and left ear pain        ***       MDM      ***        Medical Decision Making      Disposition and Plan     Clinical Impression:  1. Other migraine with status migrainosus, not intractable    2. Acute sinusitis, recurrence not specified, unspecified location         Disposition:  Discharge  3/4/2025  8:00 pm    Follow-up:  No follow-up provider specified.        Medications Prescribed:  Current Discharge Medication List        START taking these medications    Details   amoxicillin clavulanate 875-125 MG Oral Tab Take 1 tablet by mouth 2 (two) times daily for 10 days.  Qty: 20 tablet, Refills: 0      !! fluticasone propionate 50 MCG/ACT Nasal Suspension 2 sprays by Nasal route  daily.  Qty: 16 g, Refills: 0      butalbital-acetaminophen-caffeine -40 MG Oral Tab Take 1-2 tablets by mouth every 6 (six) hours as needed for Pain.  Qty: 10 tablet, Refills: 0    Associated Diagnoses: Other migraine with status migrainosus, not intractable; Acute sinusitis, recurrence not specified, unspecified location       !! - Potential duplicate medications found. Please discuss with provider.              Supplementary Documentation:

## 2025-03-05 NOTE — DISCHARGE INSTRUCTIONS
Flush nose with flonase daily  Nasal saline washes to flush sinuses   Antibiotics to treat sinusitis

## 2025-03-13 ENCOUNTER — OFFICE VISIT (OUTPATIENT)
Dept: NEUROLOGY | Facility: CLINIC | Age: 55
End: 2025-03-13
Payer: COMMERCIAL

## 2025-03-13 VITALS
WEIGHT: 132.63 LBS | OXYGEN SATURATION: 95 % | BODY MASS INDEX: 22 KG/M2 | HEART RATE: 75 BPM | DIASTOLIC BLOOD PRESSURE: 62 MMHG | RESPIRATION RATE: 18 BRPM | SYSTOLIC BLOOD PRESSURE: 106 MMHG

## 2025-03-13 DIAGNOSIS — R51.9 NONINTRACTABLE HEADACHE, UNSPECIFIED CHRONICITY PATTERN, UNSPECIFIED HEADACHE TYPE: Primary | ICD-10-CM

## 2025-03-13 DIAGNOSIS — R42 DIZZINESS: ICD-10-CM

## 2025-03-13 PROCEDURE — 99214 OFFICE O/P EST MOD 30 MIN: CPT | Performed by: OTHER

## 2025-03-13 RX ORDER — LISINOPRIL 2.5 MG/1
2.5 TABLET ORAL DAILY
COMMUNITY
Start: 2025-02-27

## 2025-03-13 NOTE — PROGRESS NOTES
Neurology History & Physical     ASSESSMENT & PLAN:      ICD-10-CM    1. Nonintractable headache, unspecified chronicity pattern, unspecified headache type  R51.9 MRI BRAIN (W+WO) (CPT=70553)      2. Dizziness  R42 MRI BRAIN (W+WO) (CPT=70553)        Recurrent headaches with dizziness and throbbing with photophobia, though also having nasal congestion.  Could again have sinus component as abx has helped.  I advised that, if doesn't resolve in 1-2 weeks, I would recommend seeing PCP to evaluate the sinus component, and to have brain MRI.      Also recommended ibuprofen 400-600 mg or naproxen 200-250 mg, but not both.  Either one can be take 1-2 times a week for headache.    Return in about 6 weeks (around 4/24/2025).       ~~~~~~~~~~~~~~~~~~~~~~~~~~    CHIEF COMPLAINT / REASON FOR VISIT:    Chief Complaint   Patient presents with    Headache     Follow up from ER seen on 304/2025 for migraines patient states headaches have become worst      HISTORY OBTAINED FROM:  Patient and others as above  Data review (see below)    HISTORY OF PRESENT ILLNESS:  Jennifer Freeman is a 54 year old female with long history of headaches for many years, usually excedrin responsive, but worse after Nov 2022, and again worse after Dec 2023.  Since Nov 2022 excedrin has not worked.  She also more recently tried tylenol, allegra D, saline sinus rinses, none were effective.  These are different than past headaches - the current headaches are assoc with sinus congestion, are more frontal.  +p/p/n  No vision or hearing issues.  She cannot sleep due to headaches.  Not diurnal, not positional, not cough/sneeze/valsalva related.  She was having daily headaches all of January 2024 and first week of Feb 2024, but since completing abx, no further headaches in past 2 weeks.  Has some dizziness and BUE numbness but these resolved when headaches stopped.  No neck pain.  No weakness.  No autonomic sx, other than bilateral rhinorrhea.    Although she tested  negative, she was having URI symptoms in 2023 while exposed to  who was COVID +.  She also had COVID 2022.    INTERIM HISTORY:  Headaches went away so she did not have MRI.  They returned 2 weeks ago, assoc with poor concentration.  PCP gave ubrelvy (no benefit).  She went to ER, Head CT unremarkable, she was given abx and was doing saline rinses, which are helping. Headache is gradually improving but she still has pounding in the head and ears.  No tinnitus or pulsatile tinnitus.  Again did have dizziness, but no BUE numbness, with headaches (dizziness improving as headache improving, but both still persistent).  The pounding/throbbing is ongoing, with some photophobia.  She has had a similar issue 2 years ago after URI.    DATA REVIEWED:  As documented in the HPI    2025  Head CT unremarkable   CBC, CMP unremarkable    2024  Head CT unremarkable     Dec 2023  Ob/gyn note    2023  Clinic note (Lucretia Gross)  COVID PCR unremarkable x2    2023  CMP unremarkable     PHYSICAL EXAMINATION:  /62   Pulse 75   Resp 18   Wt 132 lb 9.6 oz (60.1 kg)   LMP 2022 (Approximate)   SpO2 95%   BMI 22.07 kg/m²     Gen: in NAD  MSE: nl attn/conc, nl language, nl fund of knowledge  CN: EOMI, VFF, nl facial mvmt, nl palate, nl tongue  Motor: 5/5 x4, no drift  DTR: 2+ BUE and BLE  Coord: nl FTN b/I  Gait: normal    NEUROLOGICAL FAMILY HISTORY:  Daughter has headaches    PAST MEDICAL HISTORY:  Past Medical History:    Anxiety    when mother was dx and  of pancreatic CA    Back pain    Bloating    Change in hair    Constipation    Diabetes (HCC)    Essential hypertension    Fibroids    Flatulence/gas pain/belching    Frequent urination    Headache disorder    Heart palpitations    High blood pressure    High cholesterol    Hyperlipidemia    Indigestion    Leaking of urine    Migraines    Nausea    Painful swallowing    Pap smear for cervical cancer screening    a year and half ago     Visual impairment    contacts/glasses    Wears glasses       PAST SURGICAL HISTORY:  Past Surgical History:   Procedure Laterality Date    Anterior repair  08/30/2022    Enterocele repair  08/30/2022    Hysterectomy      Midurethral sling  08/30/2022    Posterior repair  08/30/2022    Uterosacral ligament suspension  08/30/2022    Vaginal hysterectomy  08/30/2022    with bilateral salpingectomy       SOCIAL HISTORY:  Social History     Socioeconomic History    Marital status:    Tobacco Use    Smoking status: Never    Smokeless tobacco: Never   Vaping Use    Vaping status: Never Used   Substance and Sexual Activity    Alcohol use: Not Currently     Comment: socially    Drug use: No    Sexual activity: Yes     Partners: Male   Other Topics Concern    Caffeine Concern Yes     Comment: 1 cup coffee per day    Exercise Yes     Comment: 2-3 x week, walking    Seat Belt Yes       ALLERGIES:  Allergies   Allergen Reactions    Epoxy Resins RASH    Other RASH     Patients she recently had allergy skin testing and is allergic Epoxy  - patient states unsure of exposure - but had a rash on face       CURRENT MEDICATIONS:   lisinopril 2.5 MG Oral Tab Take 1 tablet (2.5 mg total) by mouth daily.      amoxicillin clavulanate 875-125 MG Oral Tab Take 1 tablet by mouth 2 (two) times daily for 10 days. 20 tablet 0    fluticasone propionate 50 MCG/ACT Nasal Suspension 2 sprays by Nasal route daily. 16 g 0    METFORMIN 500 MG Oral Tab TAKE ONE TABLET BY MOUTH ONCE DAILY WITH BREAKFAST 90 tablet 0    Glucose Blood (CONTOUR NEXT TEST) In Vitro Strip Use to test once daily. 100 each 1    acidophilus-pectin Oral Cap Take 1 capsule by mouth daily.      MAGNESIUM OR Take by mouth.      omeprazole 20 MG Oral Capsule Delayed Release Take one capsule (20 mg total) by mouth once daily, 30 minutes prior to breakfast. 90 capsule 3    NATURAL PSYLLIUM FIBER OR Take by mouth daily.      simvastatin 10 MG Oral Tab Take 1 tablet (10 mg total)  by mouth nightly.      Metoprolol Succinate ER 25 MG Oral Tablet 24 Hr Take 0.5 tablets (12.5 mg total) by mouth daily.         Cristobal Hooker MD, FAES, FAAN  Board-Certified in Neurology, Epilepsy, and Clinical Neurophysiology  [facility]

## 2025-03-13 NOTE — PATIENT INSTRUCTIONS
Instructions from Dr. Hooker:       Try ibuprofen 400-600 mg or naproxen 200-250 mg, but not both.  Either one can be take 1-2 times a week for headache.    ~~~~~~~~~~~~~~~~~~~~~~~     Refill policies:    Allow 2-3 business days for refills; controlled substances may take longer.  Contact your pharmacy at least 5 days prior to running out of medication and have them send an electronic request or submit request through the “request refill” option in your [employer] account.  Refills are not addressed on weekends; covering physicians do not authorize routine medications on weekends.  No narcotics or controlled substances are refilled after noon on Fridays or by on call physicians.  By law, narcotics must be electronically prescribed.  A 30 day supply with no refills is the maximum allowed.  If your prescription is due for a refill, you may be due for a follow up appointment.  To best provide you care, patients receiving routine medications need to be seen at least once a year.  Patients receiving narcotic/controlled substance medications need to be seen at least once every 3 months.  In the event that your preferred pharmacy does not have the requested medication in stock (e.g. Backordered), it is your responsibility to find another pharmacy that has the requested medication available.  We will gladly send a new prescription to that pharmacy at your request.    Scheduling Tests:    If your physician has ordered radiology tests such as MRI or CT scans, please contact Central Scheduling at [phone] right away to schedule the test.  Once scheduled, the [facility] Centralized Referral Team will work with your insurance carrier to obtain pre-certification or prior authorization.  Depending on your insurance carrier, approval may take 3-10 days.  It is highly recommended patients assure they have received an authorization before having a test performed.  If test is done without insurance authorization, patient may be responsible  for the entire amount billed.      Precertification and Prior Authorizations:  If your physician has recommended that you have a procedure or additional testing performed the [facility] Centralized Referral Team will contact your insurance carrier to obtain pre-certification or prior authorization.    You are strongly encouraged to contact your insurance carrier to verify that your procedure/test has been approved and is a COVERED benefit.  Although the [facility] Centralized Referral Team does its due diligence, the insurance carrier gives the disclaimer that \"Although the procedure is authorized, this does not guarantee payment.\"    Ultimately the patient is responsible for payment.   Thank you for your understanding in this matter.  Paperwork Completion:  If you require FMLA or disability paperwork for your recovery, please make sure to either drop it off or have it faxed to our office at [phone]. Be sure the form has your name and date of birth on it.  The form will be faxed to our Forms Department and they will complete it for you.  There is a 25$ fee for all forms that need to be filled out.  Please be aware there is a 10-14 day turnaround time.  You will need to sign a release of information (LEANNE) form if your paperwork does not come with one.  You may call the Forms Department with any questions at [phone].  Their fax number is [phone].

## 2025-03-18 ENCOUNTER — OFFICE VISIT (OUTPATIENT)
Facility: CLINIC | Age: 55
End: 2025-03-18
Payer: COMMERCIAL

## 2025-03-18 ENCOUNTER — LAB ENCOUNTER (OUTPATIENT)
Dept: LAB | Age: 55
End: 2025-03-18
Attending: NURSE PRACTITIONER
Payer: COMMERCIAL

## 2025-03-18 VITALS
WEIGHT: 129 LBS | HEIGHT: 65 IN | HEART RATE: 75 BPM | BODY MASS INDEX: 21.49 KG/M2 | OXYGEN SATURATION: 97 % | SYSTOLIC BLOOD PRESSURE: 110 MMHG | DIASTOLIC BLOOD PRESSURE: 66 MMHG

## 2025-03-18 DIAGNOSIS — E55.9 VITAMIN D DEFICIENCY: ICD-10-CM

## 2025-03-18 DIAGNOSIS — E78.5 HYPERLIPIDEMIA ASSOCIATED WITH TYPE 2 DIABETES MELLITUS (HCC): ICD-10-CM

## 2025-03-18 DIAGNOSIS — E11.69 HYPERLIPIDEMIA ASSOCIATED WITH TYPE 2 DIABETES MELLITUS (HCC): ICD-10-CM

## 2025-03-18 DIAGNOSIS — Z13.89 SCREENING FOR NEPHROPATHY: ICD-10-CM

## 2025-03-18 DIAGNOSIS — Z13.21 ENCOUNTER FOR VITAMIN DEFICIENCY SCREENING: ICD-10-CM

## 2025-03-18 DIAGNOSIS — I15.2 HYPERTENSION ASSOCIATED WITH TYPE 2 DIABETES MELLITUS (HCC): ICD-10-CM

## 2025-03-18 DIAGNOSIS — E11.59 HYPERTENSION ASSOCIATED WITH TYPE 2 DIABETES MELLITUS (HCC): ICD-10-CM

## 2025-03-18 DIAGNOSIS — E11.9 TYPE 2 DIABETES MELLITUS TREATED WITHOUT INSULIN (HCC): Primary | ICD-10-CM

## 2025-03-18 LAB
CREAT UR-SCNC: 39.5 MG/DL
HEMOGLOBIN A1C: 5.8 % (ref 4.3–5.6)
MICROALBUMIN UR-MCNC: <0.3 MG/DL
VIT B12 SERPL-MCNC: 604 PG/ML (ref 211–911)
VIT D+METAB SERPL-MCNC: 37.4 NG/ML (ref 30–100)

## 2025-03-18 PROCEDURE — 82043 UR ALBUMIN QUANTITATIVE: CPT | Performed by: NURSE PRACTITIONER

## 2025-03-18 PROCEDURE — 83036 HEMOGLOBIN GLYCOSYLATED A1C: CPT | Performed by: NURSE PRACTITIONER

## 2025-03-18 PROCEDURE — 95251 CONT GLUC MNTR ANALYSIS I&R: CPT | Performed by: NURSE PRACTITIONER

## 2025-03-18 PROCEDURE — 82570 ASSAY OF URINE CREATININE: CPT | Performed by: NURSE PRACTITIONER

## 2025-03-18 PROCEDURE — 82306 VITAMIN D 25 HYDROXY: CPT

## 2025-03-18 PROCEDURE — 36415 COLL VENOUS BLD VENIPUNCTURE: CPT

## 2025-03-18 PROCEDURE — 82607 VITAMIN B-12: CPT | Performed by: NURSE PRACTITIONER

## 2025-03-18 PROCEDURE — 99215 OFFICE O/P EST HI 40 MIN: CPT | Performed by: NURSE PRACTITIONER

## 2025-03-18 NOTE — PROGRESS NOTES
[facility] Note    Name: Jennifer Freeman    Date: 03/18/25     Jennifer Freeman is a 54 year old female who presents for evaluation of T2DM management.     Chief complaint: Follow - Up (PT here for routine T2DM f/u, per pt no current concerns or sx./Last A1c value was 5.8% done 9/26/2024.)       Subjective:   DM hx:  -Diagnosed with diabetes in March, 2024  -Family history- yes, father       Initial HPI consult - 9/26/2024  She states she lost weight with Metformin. Initially she was managed by PCP.   Per pt, the other PCP in her PCP's office mentioned to her to stop taking her MF after she noted hypoglycemia of 50s in August.  DM meds at first office visit:She last took  Metformin  mg once a day mid week of August due to   hypoglycemia: Yes 50     Continuous Glucose Monitoring Interpretation  Jennifer Freeman has undergone continuous glucose monitoring with the Qoostaryle Sreekanth 3 CGM with phone (connected to clinic profile).  The blood glucose tracings were evaluated for two weeks prior to office visit. Blood glucose tracings demonstrated no significant hyperglycemia. There were no significant hypoglycemia notedduring the weeks of evaluation.    Date: 9/13-9/26: %, GMI 6.4% ,  low 0% , very low 0%, SD 8.4mg/dl, Sensor usage: 92%    -Re: potential DM medication contraindication (if positive, checkbox selected):  History of pancreatitis- denies   Personal/fam hx of medullary thyroid cancer/MEN2- denies   x history of recent/frequent UTI/yeast infxn- last July   Previous amputation related to diabetes - denies     -Presence of associated DM complications (if positive, checkbox selected):  x macrovascular complications (CAD/CVA/PAD)  States about 5 years ago had palpitations, had hx of rare history of atrial tachycardia   Neuropathy- right hand numbness?  Retinopathy- summer 2023, [employer],   Nephropathy- denies   x HTN  x hyperlipidemia  Stroke/TIA- denies   Gastroparesis- denies   [] Wound, ulcer  or amputations- denies     - Lifestyle: eat 3x a day , states being aware of her carbs intake now   - Modifying factors: none   Steroid use: denies     Previously trialed/failed DM meds: none     Interim Hx with Natali APN 03/18/25  :   Current treatment: Metformin 500 mg was taking 1-2x per week   Testing: Continuous Glucose Monitoring Interpretation  Jennifer Freeman  has undergone continuous glucose monitoring with the One[employer]umestyle Sreekanth 3 CGM with phone (connected to clinic profile).  The blood glucose tracings were evaluated for two weeks prior to office visit. Blood glucose tracings demonstrated no significant hyperglycemia. There were no significant hypoglycemia notedduring the weeks of evaluation.    Date: 03/04-3/17/25: %, GMI 6.1% ,  low 0% , very low 0%, GV 10%sreekanth, Sensor usage: 93%    Hypoglycemia: denies   New complication related to DM:  had an ER visit beginning of March, states she had flu and 2 weeks later she had headache/sinus, got abx for it, no admission associated with ER visit, states she feels better now.   History/Other:    Allergies, PMH, SocHx and FHx reviewed and updated as appropriate in Epic on    metFORMIN 500 MG Oral Tab Take 1 tablet (500 mg total) by mouth daily with breakfast. 90 tablet 0    lisinopril 2.5 MG Oral Tab Take 1 tablet (2.5 mg total) by mouth daily.      NON FORMULARY Ubrelvy 100mg      fluticasone propionate 50 MCG/ACT Nasal Suspension 2 sprays by Nasal route daily. 16 g 0    Glucose Blood (CONTOUR NEXT TEST) In Vitro Strip Use to test once daily. 100 each 1    acidophilus-pectin Oral Cap Take 1 capsule by mouth daily.      MAGNESIUM OR Take by mouth.      omeprazole 20 MG Oral Capsule Delayed Release Take one capsule (20 mg total) by mouth once daily, 30 minutes prior to breakfast. 90 capsule 3    NATURAL PSYLLIUM FIBER OR Take by mouth daily.      simvastatin 10 MG Oral Tab Take 1 tablet (10 mg total) by mouth nightly.      Metoprolol Succinate ER 25 MG Oral Tablet  24 Hr Take 0.5 tablets (12.5 mg total) by mouth daily.       Allergies   Allergen Reactions    Epoxy Resins RASH    Other RASH     Patients she recently had allergy skin testing and is allergic Epoxy  - patient states unsure of exposure - but had a rash on face     Current Outpatient Medications   Medication Sig Dispense Refill    metFORMIN 500 MG Oral Tab Take 1 tablet (500 mg total) by mouth daily with breakfast. 90 tablet 0    lisinopril 2.5 MG Oral Tab Take 1 tablet (2.5 mg total) by mouth daily.      NON FORMULARY Ubrelvy 100mg      fluticasone propionate 50 MCG/ACT Nasal Suspension 2 sprays by Nasal route daily. 16 g 0    Glucose Blood (CONTOUR NEXT TEST) In Vitro Strip Use to test once daily. 100 each 1    acidophilus-pectin Oral Cap Take 1 capsule by mouth daily.      MAGNESIUM OR Take by mouth.      omeprazole 20 MG Oral Capsule Delayed Release Take one capsule (20 mg total) by mouth once daily, 30 minutes prior to breakfast. 90 capsule 3    NATURAL PSYLLIUM FIBER OR Take by mouth daily.      simvastatin 10 MG Oral Tab Take 1 tablet (10 mg total) by mouth nightly.      Metoprolol Succinate ER 25 MG Oral Tablet 24 Hr Take 0.5 tablets (12.5 mg total) by mouth daily.       Past Medical History:    Anxiety    when mother was dx and  of pancreatic CA    Back pain    Bloating    Change in hair    Constipation    Diabetes (HCC)    Essential hypertension    Fibroids    Flatulence/gas pain/belching    Frequent urination    Headache disorder    Heart palpitations    High blood pressure    High cholesterol    Hyperlipidemia    Indigestion    Leaking of urine    Migraines    Nausea    Painful swallowing    Pap smear for cervical cancer screening    a year and half ago    Visual impairment    contacts/glasses    Wears glasses     Family History   Problem Relation Age of Onset    Hypertension Mother     Cancer Mother 38        Uterine, Pancreatic    Colon Polyps Mother     Diabetes Father     Heart Attack Father      Other (prediabetes) Brother     Heart Disorder Maternal Grandmother     Heart Disorder Maternal Grandfather     Heart Disorder Paternal Grandmother     Other (Other) Paternal Grandfather     Uterine Cancer Maternal Aunt     Ovarian Cancer Maternal Aunt 45    Uterine Cancer Paternal Aunt     Breast Cancer Maternal Cousin Female 38    Breast Cancer Paternal Cousin Female 48    Cerebral Palsy Daughter      Social history: Reviewed.      ROS/Exam    REVIEW OF SYSTEMS: Ten point review of systems has been performed and is otherwise negative and/or non-contributory, except as described above.     VITALS  Vitals:    03/18/25 0815   BP: 110/66   Pulse: 75   SpO2: 97%   Weight: 129 lb (58.5 kg)   Height: 5' 5\" (1.651 m)         Wt Readings from Last 6 Encounters:   03/18/25 129 lb (58.5 kg)   03/13/25 132 lb 9.6 oz (60.1 kg)   03/04/25 129 lb (58.5 kg)   02/03/25 133 lb 9.6 oz (60.6 kg)   09/26/24 128 lb (58.1 kg)   08/09/24 128 lb 9.6 oz (58.3 kg)       PHYSICAL EXAM  CONSTITUTIONAL:  awake, alert, cooperative, no apparent distress, and appears stated age Vss stable   PSYCH: normal affect  LUNGS: breathing comfortably  CARDIOVASCULAR:  regular rate   NECK:  no palpable thyroid nodules         Labs/Imaging:   Lab Results   Component Value Date    CHOLEST 154 09/26/2024    CHOLEST 143 03/07/2024    TRIG 125 09/26/2024    TRIG 75 03/07/2024    HDL 54 09/26/2024    HDL 57 03/07/2024    LDL 78 09/26/2024    LDL 71 03/07/2024     Lab Results   Component Value Date    MICROALBCREA  03/18/2025      Comment:      Unable to calculate due to Urine Microalbumin <0.3 mg/dL          Lab Results   Component Value Date    CREATSERUM 0.71 03/04/2025    CREATSERUM 0.78 07/25/2024    EGFRCR 100 03/04/2025    EGFRCR 90 07/25/2024     Lab Results   Component Value Date    AST 23 03/04/2025    AST 21 07/25/2024    ALT 23 03/04/2025    ALT 24 07/25/2024       Lab Results   Component Value Date    TSH 1.150 03/07/2024    TSH 1.580 09/20/2023     T4F 1.0 03/07/2024       Overall glucose control:  Lab Results   Component Value Date    A1C 5.8 (A) 03/18/2025    A1C 5.8 (A) 09/26/2024    A1C 6.5 (H) 03/07/2024       Supplementary Documentation:   -Surveillance for Diabetes Complications & Risks  Foot exam/neuropathy: Last Foot Exam: 09/26/24    Retinopathy screening: No data recordedNo data recorded  [employer] in [place], [place] - Sept of 2024    Assessment & Plan:     ICD-10-CM    1. Type 2 diabetes mellitus treated without insulin (HCC)  E11.9 POC Hemoglobin A1C     Microalb/Creat Ratio, Random Urine     Vitamin B12     GLUC MNTR CONT REC FROM NTRSTL TISS FLU PHYS I&R     metFORMIN 500 MG Oral Tab      2. Screening for nephropathy  Z13.89       3. Hypertension associated with type 2 diabetes mellitus (HCC)  E11.59     I15.2       4. Hyperlipidemia associated with type 2 diabetes mellitus (HCC)  E11.69     E78.5       5. Encounter for vitamin deficiency screening  Z13.21 Vitamin D          Jennifer Freeman is a pleasant 54 year old female here for evaluation of:    #Diabetes without insulin- PMHx of Type 2 diabetes mellitus diagnosed in March, 2024.   -  Last A1c value was 5.8% done 3/18/2025, at goal.  - C peptide detected last 9/26/24   - Reviewed - corin Date: 03/04-3/17/25: %, GMI 6.1% ,  low 0% , very low 0%, GV 10%corin, Sensor usage: 93%, at goal as well.   - Goal <7%. Importance of better glucose control in preventing onset/progression of end-organ damage discussed, as well as goals of therapy and clinical significance of A1C.   Plan: Patient states she had a history of rare atrial tachycardia, normal stress echo in 2020, followed by PCP.  Discussed to patient of maintaining controlled A1c less than 7%, utilizing metformin low-dose 500 mg, (-3/4/25 normal ) will check vitamin B 12, as sometimes she complains of right hand numbness.   - med changes:  -Continue Metformin 500 mg as needed,  if glucose is above 130 fasting  -have [facility] fax  US the visit note last Sept, 2024 and schedule yearly retina check this coming fall, 2025  - Discussed Balanced diet: carbohydrates, protein, healthy fats and fiber in each meal. Exercise of at least 150 mins each week. Decrease your simple carbohydrates intake such as sweets: cookies, soda, candy, white rice, white pasta, syrups  - continue Freestyle Sreekanth 3 CGM with phone (connected to clinic profile)  - the patient is not on insulin and does not have history of recurrent hypoglycemia, CGM ordered, likely will have to pay out of pocket for CGM  - continue to check BG 1-2x/day using glucometer or CGM  - no strict contraindication for metformin   -See above header \"Supplementary Documentation\" for surveillance for diabetes complications & risks  - current BMI 21, to continue balanced diet: carbohydrates, protein, healthy fats and fiber in each meal. Exercise of at least 150 mins each week. Decrease your simple carbohydrates intake such as sweets: cookies, soda, candy, white rice, white pasta, syrups    #Nephropathy screening: Checking U CMR  Lab Results   Component Value Date    EGFRCR 100 03/04/2025    MICROALBCREA  03/18/2025      Comment:      Unable to calculate due to Urine Microalbumin <0.3 mg/dL          #HTN: SBP is to goal <130   BP Readings from Last 1 Encounters:   03/18/25 110/66   BP Meds: lisinopril Tabs - 2.5 MG; metoprolol succinate ER Tb24 - 25 MG     #Hyperlipidemia/Lipids: LDL is not to goal   - Discussed decrease saturated fat, trans fat, and cholesterol intake  example: Butter, lard, shortening, coconut, coconut oil, palm oil, fried food, bologna,pepperoni, salami, egg yolks, Poultry skin, visible meat fat.  - Increase non-starchy fiber intake: aspararagus, carrots, broccoli, [place] sprouts, baby corn, eggplant, cabbage, tomatoes, salad greens  - repeating lipid, to be done about a week prior 3 months f/u   Lab Results   Component Value Date    LDL 78 09/26/2024    TRIG 125 09/26/2024    Cholesterol Meds: simvastatin Tabs - 10 MG , taking it daily, started 4 years ago, no misses, per PCP   States she went to cardiology , has new PCP in May   PCP told her to not increase from 10 to 20 Plan: Can add extra omega 3 or fish oil with simvastatin 10 mg        #Screening for Vitamin B and D   - checking levels, shabnam she is on Metformin     Return in about 4 months (around 7/18/2025) for diabetes follow up.    Total time spent  45 minutes today on obtaining history, reviewing pertinent labs, evaluating patient, providing multiple treatment options, reinforcing diet/exercise and compliance, and completing documentation, of which greater than 50% was spent in face to face discussion with the patient   who demonstrated understanding and agreement with plan.     Thank you for allowing me to participate in the care of this patient.  Please feel free to contact me with any questions.    RONI Hdez, [facility]  Endocrinology, Diabetes & Metabolism   3/18/25    In reviewing this note, please be advised that Dragon Voice Recognition software used to dictate the note may have made errors in recognizing some of the words or phrases.     Note to patient: The 21 Century Cures Act makes medical notes like these available to patients in the interest of transparency. However, be advised this is a medical document. It is intended as peer to peer communication. It is written in medical language and may contain abbreviations or verbiage that are unfamiliar. It may appear blunt or direct. Medical documents are intended to carry relevant information, facts as evident, and the clinical opinion of the practitioner.

## 2025-03-18 NOTE — PATIENT INSTRUCTIONS
Return Visit:  APN: Return in about 4 months (around 7/18/2025) for diabetes follow up.  [] Video visit  [x] In person only    [x]  Directions to 1st floor lab    []  Give blood sugar log  []  PAP paperwork for Ozempic/Jardiance (english/[place])     Summary of today's visit:  Medication changes:    Continue Metformin as needed,  if glucose is above 130 fasting  Can add extra omega 3 or fish oil with simvastatin 10 mg   -have [facility] fax US the visit note last Sept, 2024 and schedule yearly retina check this coming fall, 2025  - Since you are not on insulin, a continuous glucose monitor would need to be purchased out of pocket. This is $75 for 2 sensors (1 month supply). Please let us know if you would like for us to order this to your pharmacy.   -- Targeted fasting glucose (at least 8-10 hrs of no food/sweetened beverage intake)< 130 and 2 hrs after lunch or dinner< 180  and prior lunch or dinner <140  -  Follow 15g/15 min rule if you develop any hypoglycemia symptoms w/ glucose <70  ;  but if glucose <55 follow 30g/15 min rule. Once glucose above 80, eat a protein or food w protein. Additional comments:   - If labs were ordered today, please complete prior to your follow up visit   - Please let us know if you require any refills at least 1 week prior to your medication running out   - Please call our office if sugars at home are consistently greater than 250 or less than 70     HOW TO TREAT LOW BLOOD SUGAR (Hypoglycemia)  Low blood sugar= Less than 70, or if you start to have symptoms (below)  Symptoms: Shaking or trembling, fast heart rate, extreme hunger, sweating, confusion/difficulty concentrating, dizziness.    How to treat a low blood sugar if you are able to eat/drink: The Rule of 15/15  If you are using continuous glucose monitor that says you are low, but you do not have any symptoms, verify on fingerstick that your blood sugar is actually low before treating.   Eat 15 grams of carbs (see examples  below)  Check your blood sugar after 15 minutes. If it’s still below your target range, have another serving.   Repeat these steps until it’s in your target range. Once it’s in range, if you're nervous about your sugar going low again, have a protein source (ie, a spoonful of peanut butter).   If you have a CGM you want to look for how your arrow has changed. If you arrow is pointed up or sideways after 15 min, give your CGM more time OR check with a finger stick. Try not to eat more food until at least 15 min after the first BG check - otherwise you risk having a rebound high.  If you are experiencing symptoms and you are unable to check your blood glucose for any reason, treat the hypoglycemia.  If someone has a low blood sugar and is unconscious: Don’t hesitate to call 911. If someone is unconscious and glucagon is not available or someone does not know how to use it, call 911 immediately.     To treat a low, I recommend you carry with you easy, pre-portioned treatment for low blood sugars that are 15G of carbs:   - Children sized squeeze pouch applesauce (high fiber + carbs help prevent too high of a spike)  - Small children's sized juicebox- 15g carb --> 4oz juice box  - Glucose tablets from [employer], you can find them near diabetes supplies --> Note, you will need to eat 3-4 tablets to get to 15g of carbs  - Children sized fruit snack pack- look for one with 15 grams of total carbohydrate  - Choice of how to treat your low is important. Complex carbs, or foods that contain fats along with carbs (like chocolate) can slow the absorption of glucose and should NOT be used to treat an emergency low

## 2025-03-25 ENCOUNTER — TELEPHONE (OUTPATIENT)
Facility: CLINIC | Age: 55
End: 2025-03-25

## 2025-03-25 NOTE — TELEPHONE ENCOUNTER
Phoned [facility] at [phone] and spoke to Cash who stated she will fax pts last Diabetic Eye Exam.    Will await fax

## 2025-04-08 PROBLEM — K76.0 METABOLIC DYSFUNCTION-ASSOCIATED STEATOTIC LIVER DISEASE (MASLD): Status: ACTIVE | Noted: 2024-08-07

## 2025-05-08 ENCOUNTER — HOSPITAL ENCOUNTER (OUTPATIENT)
Dept: GENERAL RADIOLOGY | Age: 55
Discharge: HOME | End: 2025-05-08
Attending: FAMILY MEDICINE
Payer: COMMERCIAL

## 2025-05-08 ENCOUNTER — OFFICE VISIT (OUTPATIENT)
Dept: FAMILY MEDICINE CLINIC | Facility: CLINIC | Age: 55
End: 2025-05-08
Payer: COMMERCIAL

## 2025-05-08 ENCOUNTER — TELEPHONE (OUTPATIENT)
Dept: FAMILY MEDICINE CLINIC | Facility: CLINIC | Age: 55
End: 2025-05-08

## 2025-05-08 VITALS
HEIGHT: 66 IN | HEART RATE: 72 BPM | WEIGHT: 130 LBS | BODY MASS INDEX: 20.89 KG/M2 | DIASTOLIC BLOOD PRESSURE: 78 MMHG | OXYGEN SATURATION: 97 % | SYSTOLIC BLOOD PRESSURE: 118 MMHG | RESPIRATION RATE: 18 BRPM

## 2025-05-08 DIAGNOSIS — E78.2 MIXED HYPERLIPIDEMIA: ICD-10-CM

## 2025-05-08 DIAGNOSIS — Z12.31 SCREENING MAMMOGRAM FOR BREAST CANCER: ICD-10-CM

## 2025-05-08 DIAGNOSIS — M25.562 CHRONIC PAIN OF BOTH KNEES: ICD-10-CM

## 2025-05-08 DIAGNOSIS — G89.29 CHRONIC PAIN OF BOTH KNEES: ICD-10-CM

## 2025-05-08 DIAGNOSIS — M25.561 CHRONIC PAIN OF BOTH KNEES: ICD-10-CM

## 2025-05-08 DIAGNOSIS — K21.9 GASTROESOPHAGEAL REFLUX DISEASE WITHOUT ESOPHAGITIS: ICD-10-CM

## 2025-05-08 DIAGNOSIS — E11.9 TYPE 2 DIABETES MELLITUS WITHOUT COMPLICATION, WITHOUT LONG-TERM CURRENT USE OF INSULIN (HCC): ICD-10-CM

## 2025-05-08 DIAGNOSIS — Z00.00 ENCOUNTER FOR ANNUAL PHYSICAL EXAM: Primary | ICD-10-CM

## 2025-05-08 DIAGNOSIS — K76.0 METABOLIC DYSFUNCTION-ASSOCIATED STEATOTIC LIVER DISEASE (MASLD): ICD-10-CM

## 2025-05-08 DIAGNOSIS — N60.02 CYST OF LEFT BREAST: ICD-10-CM

## 2025-05-08 DIAGNOSIS — E78.5 DYSLIPIDEMIA: ICD-10-CM

## 2025-05-08 PROBLEM — R53.83 MALAISE AND FATIGUE: Status: RESOLVED | Noted: 2019-01-08 | Resolved: 2025-05-08

## 2025-05-08 PROBLEM — I47.19 ATRIAL TACHYCARDIA (HCC): Status: RESOLVED | Noted: 2019-11-19 | Resolved: 2025-05-08

## 2025-05-08 PROBLEM — R10.13 ABDOMINAL PAIN, EPIGASTRIC: Status: RESOLVED | Noted: 2023-12-27 | Resolved: 2025-05-08

## 2025-05-08 PROBLEM — R94.31 ABNORMAL EKG: Status: RESOLVED | Noted: 2022-08-23 | Resolved: 2025-05-08

## 2025-05-08 PROBLEM — K82.4 GALLBLADDER POLYP: Status: RESOLVED | Noted: 2024-05-28 | Resolved: 2025-05-08

## 2025-05-08 PROBLEM — R00.2 PALPITATIONS: Status: RESOLVED | Noted: 2023-01-23 | Resolved: 2025-05-08

## 2025-05-08 PROBLEM — R07.9 ACUTE CHEST PAIN: Status: RESOLVED | Noted: 2018-12-20 | Resolved: 2025-05-08

## 2025-05-08 PROBLEM — R53.81 MALAISE AND FATIGUE: Status: RESOLVED | Noted: 2019-01-08 | Resolved: 2025-05-08

## 2025-05-08 PROBLEM — F41.1 GENERALIZED ANXIETY DISORDER: Status: RESOLVED | Noted: 2019-01-08 | Resolved: 2025-05-08

## 2025-05-08 PROBLEM — M25.511 RIGHT ANTERIOR SHOULDER PAIN: Status: RESOLVED | Noted: 2023-02-27 | Resolved: 2025-05-08

## 2025-05-08 PROBLEM — R13.10 DYSPHAGIA: Status: RESOLVED | Noted: 2023-12-27 | Resolved: 2025-05-08

## 2025-05-08 PROCEDURE — 99386 PREV VISIT NEW AGE 40-64: CPT | Performed by: FAMILY MEDICINE

## 2025-05-08 PROCEDURE — 73562 X-RAY EXAM OF KNEE 3: CPT | Performed by: FAMILY MEDICINE

## 2025-05-08 PROCEDURE — 99204 OFFICE O/P NEW MOD 45 MIN: CPT | Performed by: FAMILY MEDICINE

## 2025-05-08 RX ORDER — ACYCLOVIR 800 MG/1
1 TABLET ORAL
Qty: 2 EACH | Refills: 5 | Status: SHIPPED | OUTPATIENT
Start: 2025-05-08

## 2025-05-08 RX ORDER — FEXOFENADINE HCL 180 MG/1
180 TABLET ORAL DAILY
COMMUNITY

## 2025-05-08 RX ORDER — ACYCLOVIR 800 MG/1
1 TABLET ORAL
COMMUNITY
End: 2025-05-08

## 2025-05-08 NOTE — PROGRESS NOTES
The following individual(s) verbally consented to be recorded using ambient AI listening technology and understand that they can each withdraw their consent to this listening technology at any point by asking the clinician to turn off or pause the recording:    Patient name: Jennifer Freeman

## 2025-05-08 NOTE — TELEPHONE ENCOUNTER
Continuous Glucose Sensor (FREESTYLE EARNEST 3 SENSOR)     Patient must meet ALL the criteria's listed below     -Must be on insulin  -Product must be used continuous long-term monitoring for poorly controlled insulin dependent diabetes  -HgbA1C must be greater than or equal to 7.0%  -Patient must have Type 2 diabetes  -Patient must be on a glucose monitoring regimen of 4 or more a day   or  -Prescriber must be a specialist. Example of a specialist is an endocrinologist.

## 2025-05-09 NOTE — TELEPHONE ENCOUNTER
Let patient know that because rx for corin is coming through me, it's not being covered, will need rx through Endocrine for insurance approval.

## 2025-05-12 ENCOUNTER — TELEPHONE (OUTPATIENT)
Facility: CLINIC | Age: 55
End: 2025-05-12

## 2025-05-12 DIAGNOSIS — E11.9 TYPE 2 DIABETES MELLITUS TREATED WITHOUT INSULIN (HCC): Primary | ICD-10-CM

## 2025-05-12 RX ORDER — HYDROCHLOROTHIAZIDE 12.5 MG/1
1 CAPSULE ORAL AS DIRECTED
Qty: 2 EACH | Refills: 5 | Status: SHIPPED | OUTPATIENT
Start: 2025-05-12

## 2025-05-12 NOTE — PROGRESS NOTES
HPI:    Jennifer Freeman is a 54 year old female who presents for Establish Care and Well Adult     History of Present Illness  Jennifer Freeman is a 54 year old female with diabetes who presents with knee pain and follow-up for diabetes management.    She has been experiencing significant bilateral knee pain over the past year, which has recently worsened to a severity of 8 out of 10. The pain is localized at the kneecaps and is exacerbated by activities such as climbing stairs. There is no associated swelling, and she has not sustained any knee injuries. Due to the pain, she has reduced her walking activities.    She has a history of diabetes, diagnosed approximately two years ago, which is primarily managed with diet and occasional use of metformin. She takes metformin about four times a month, particularly when consuming meals high in carbohydrates. Her last A1c was 5.8. She has experienced hypoglycemic episodes at night when she was more strict with her diet and taking metformin regularly, with blood sugar levels dropping to 50-55 mg/dL. She uses a Sreekanth sensor to monitor her glucose levels.    She experiences chronic headaches that began after a COVID-19 infection two years ago. The headaches are described as throbbing and can last for 10 to 20 days. She has tried migraine medications without relief and reports that light exacerbates the headaches. She has a history of migraines, but they were previously infrequent and short-lived.    She takes simvastatin daily for cholesterol management and metoprolol for blood pressure control. She reports no significant side effects from these medications. Her 's family has a history of diabetes, including his mother and father. She is originally from [place] and enjoys  cuisine. She walks regularly but has reduced activity due to knee pain. No recent palpitations, anxiety issues, stomach issues, and her vision is stable.       Past History:   She  has a past medical  history of Anxiety, Arthritis, Back pain, Bloating (July,2023), Change in hair, Constipation, Diabetes (HCC), Essential hypertension, Fibroids, Flatulence/gas pain/belching, Food intolerance, Frequent urination, Headache disorder, Heart palpitations, Heartburn, High blood pressure, High cholesterol, Hyperlipidemia, Indigestion, Leaking of urine, Leg swelling, Migraines, Nausea, Night sweats, Pain with bowel movements, Painful swallowing (Christian), Pap smear for cervical cancer screening (2013), Rash, Stress, Visual impairment, and Wears glasses.   She  has a past surgical history that includes vaginal hysterectomy (08/30/2022); uterosacral ligament suspension (08/30/2022); anterior repair (08/30/2022); enterocele repair (08/30/2022); posterior repair (08/30/2022); midurethral sling (08/30/2022); and hysterectomy.   Her family history includes Breast Cancer (age of onset: 38) in her maternal cousin female; Breast Cancer (age of onset: 48) in her paternal cousin female; Cancer (age of onset: 38) in her mother; Cerebral Palsy in her daughter; Colon Polyps in her mother; Diabetes in her father; Heart Attack in her father; Heart Disorder in her maternal grandfather, maternal grandmother, and paternal grandmother; Hypertension in her mother; Other in her paternal grandfather; Ovarian Cancer (age of onset: 45) in her maternal aunt; Uterine Cancer in her maternal aunt and paternal aunt; prediabetes in her brother.   She  reports that she has never smoked. She has never used smokeless tobacco. She reports current alcohol use of about 1.0 standard drink of alcohol per week. She reports that she does not use drugs.     She has a current medication list which includes the following long-term medication(s): freestyle corin 3 sensor.   She is allergic to epoxy resins and other.   Medications Ordered Prior to this Encounter[1]      REVIEW OF SYSTEMS:   Patient denies shortness of breath, denies chest pain and denies any recent  fevers or chills.    Patient reports no urinary complaints and denies headaches or visual disturbances.   Patient denies any abdominal pain at this time. Patient has no new skin lesions.  Patient reports no acute back pain and reports no dizziness or headaches.   Patient reports no visual disturbances and reports hearing has been about the same.   Patient reports no recent injury or trauma.               EXAM:    /78   Pulse 72   Resp 18   Ht 5' 6\" (1.676 m)   Wt 130 lb (59 kg)   LMP 04/25/2022 (Approximate)   SpO2 97%   BMI 20.98 kg/m²  Estimated body mass index is 20.98 kg/m² as calculated from the following:    Height as of this encounter: 5' 6\" (1.676 m).    Weight as of this encounter: 130 lb (59 kg).    General Appearance:  Alert, cooperative, no distress, appears stated age   Head:  Normocephalic, without obvious abnormality, atraumatic   Eyes:  conjunctiva/cornea is not erythematous.        Nose: No nasal drainage.    Throat: No erythema    Neck: Supple, symmetrical, trachea midline, and normal ROM  thyroid: no obvious nodules   Back:   Symmetric, no curvature, ROM normal, no CVA tenderness   Lungs:   Clear to auscultation bilaterally, respirations unlabored   Chest Wall:  No tenderness or deformity   Heart:  Regular rate and rhythm, S1, S2 normal, no murmur,   Abdomen:   Soft, non-tender, bowel sounds active. No hernia.    Genitalia:     Rectal:     Extremities: Extremities normal, atraumatic, no cyanosis or edema   Pulses: 2+ and symmetric   Skin: Skin color, texture, turgor normal, no new rashes    Lymph nodes: No obvious cervical adenopathy.    Neurologic and psych: Normal speech, Alert and oriented x 3.   Normal mood, normal insight and judgment.    Bilateral knee-tenderness, no ligamental injury.                 ASSESSMENT AND PLAN:   Diagnoses and all orders for this visit:    Encounter for annual physical exam  -     CBC With Differential With Platelet  -     Comp Metabolic Panel (14)  -      Lipid Panel  -     TSH W Reflex To Free T4    Screening mammogram for breast cancer  -     Cancel: [facility] MARIFER 2D+3D SCREENING BILAT (CPT=77067/35751); Future    Dyslipidemia  -     Hemoglobin A1C [E]    Metabolic dysfunction-associated steatotic liver disease (MASLD)    Type 2 diabetes mellitus without complication, without long-term current use of insulin (HCC)  -     Continuous Glucose Sensor (FREESTYLE EARNEST 3 SENSOR) Does not apply Misc; 1 each every 14 (fourteen) days.    Gastroesophageal reflux disease without esophagitis    Mixed hyperlipidemia    Chronic pain of both knees  -     XR KNEE (3 VIEWS), RIGHT (CPT=73562); Future  -     XR KNEE (3 VIEWS), LEFT (CPT=73562); Future    Cyst of left breast  -     [facility] MARIFER 2D+3D DIAGNOSTIC THUNA  BILAT (CPT=77022/21939); Future       Assessment & Plan  Knee pain, possibly due to osteoarthritis  Chronic bilateral knee pain, worsening over the past year, rated 8/10 in severity. No swelling or recent injury. Pain exacerbated by stair climbing. Differential includes osteoarthritis. Discussed potential for cortisone injections with caution regarding impact on blood glucose levels.  - Order knee x-rays to assess joint condition  - Refer to orthopedist for further evaluation and potential cortisone injections    Chronic headaches post-COVID-19 infection  Chronic headaches persisting since COVID-19 infection two years ago, occurring in episodes lasting 10-20 days. Previous migraine medication ineffective. Symptoms include light sensitivity and tension-like pain. MRI of the brain planned for further evaluation.  - Plan for MRI of the brain to further evaluate headaches  - Consider alternative headache management strategies if MRI is inconclusive    Type 2 diabetes mellitus without complications  Type 2 diabetes mellitus, well-controlled with an A1c of 5.8. Managed with dietary modifications and occasional metformin use. Reports nocturnal hypoglycemia when metformin is taken  regularly. Neuropathy symptoms present as numbness in legs, but B12 and Vitamin D levels are normal.  - Continue metformin as needed, approximately four times a month  - Monitor blood glucose levels at home  - Plan to recheck A1c in September  - Provide prescription for Sreekanth 3 for continuous glucose monitoring  - Consider reducing metformin if A1c remains low    Hyperlipidemia  Hyperlipidemia managed with simvastatin for four years. Reports chronic headaches, possibly exacerbated by simvastatin. No significant myalgia reported. Advised to stop simvastatin during headache episodes to assess impact on symptoms.  - Consider stopping simvastatin for one week during headache episodes to assess impact on symptoms    Wellness Visit  Routine wellness visit. Overall health appears stable. No acute concerns.  - Continue current medications as prescribed  - Maintain regular follow-up appointments    General Health Maintenance  Routine health maintenance is up to date. Mammogram and colonoscopy are current. Eye exams are conducted regularly at \[facility]\"".  - Order diagnostic mammogram  - Continue regular eye exams at \[facility]\""         Yeison Turner MD, 5/12/2025, 8:39 AM     Note to patient: The 21st Century Cures Act makes medical notes like these available to patients in the interest of transparency. However, this is a medical document intended as peer to peer communication. It is written in medical language and may contain abbreviations or verbiage that are unfamiliar. It may appear blunt or direct. Medical documents are intended to carry relevant information, facts as evident, and the clinical opinion of the practitioner who signs the document.        [1]   Current Outpatient Medications on File Prior to Visit   Medication Sig    fexofenadine 180 MG Oral Tab Take 1 tablet (180 mg total) by mouth daily.    omeprazole 20 MG Oral Capsule Delayed Release Take 1 capsule (20 mg total) by mouth daily as needed. Take one  capsule (20 mg total) by mouth once daily, 30 minutes prior to breakfast.    metFORMIN 500 MG Oral Tab Take 1 tablet (500 mg total) by mouth daily with breakfast.    lisinopril 2.5 MG Oral Tab Take 1 tablet (2.5 mg total) by mouth daily.    Glucose Blood (CONTOUR NEXT TEST) In Vitro Strip Use to test once daily.    acidophilus-pectin Oral Cap Take 1 capsule by mouth daily.    MAGNESIUM OR Take by mouth.    NATURAL PSYLLIUM FIBER OR Take by mouth daily.    simvastatin 10 MG Oral Tab Take 1 tablet (10 mg total) by mouth nightly.    Metoprolol Succinate ER 25 MG Oral Tablet 24 Hr Take 0.5 tablets (12.5 mg total) by mouth daily.     No current facility-administered medications on file prior to visit.

## 2025-05-12 NOTE — TELEPHONE ENCOUNTER
Endocrine Refill protocol for CGM supplies     Protocol Criteria:  PASSED Reason: N/A    If below requirement is met, send a 90-day supply with 1 refill per provider protocol.     Verify appointment with Endocrinology completed in the last 12 months or scheduled in the next 6 months     Last completed office visit:3/18/2025 Natali Jolley APRN   Last completed telemed visit: Visit date not found  Next scheduled Follow up:   Future Appointments   Date Time Provider Department Center   6/16/2025 12:45 PM Kathy Borjas MD [facility] [place]   7/15/2025  8:15 AM Natali Jolley APRN [facility] [place]

## 2025-05-22 ENCOUNTER — TELEPHONE (OUTPATIENT)
Dept: ORTHOPEDICS CLINIC | Facility: CLINIC | Age: 55
End: 2025-05-22

## 2025-05-22 NOTE — TELEPHONE ENCOUNTER
Patient is scheduled for DAVID knee pain. Xrays in Epic. Please advise if additional imaging is needed.  Future Appointments   Date Time Provider Department Center   6/11/2025  1:20 PM [id number]   6/16/2025 12:45 PM Kathy Borjas MD [facility] [place]   7/15/2025  8:15 AM Natali Jollye APRN [facility] [place]

## 2025-05-28 ENCOUNTER — TELEPHONE (OUTPATIENT)
Dept: NEUROLOGY | Facility: CLINIC | Age: 55
End: 2025-05-28

## 2025-06-11 ENCOUNTER — OFFICE VISIT (OUTPATIENT)
Dept: ORTHOPEDICS CLINIC | Facility: CLINIC | Age: 55
End: 2025-06-11
Payer: COMMERCIAL

## 2025-06-11 VITALS — WEIGHT: 130 LBS | HEIGHT: 67.2 IN | BODY MASS INDEX: 20.17 KG/M2

## 2025-06-11 DIAGNOSIS — M22.2X2 PATELLOFEMORAL ARTHRALGIA OF BOTH KNEES: Primary | ICD-10-CM

## 2025-06-11 DIAGNOSIS — M22.2X1 PATELLOFEMORAL ARTHRALGIA OF BOTH KNEES: Primary | ICD-10-CM

## 2025-06-11 PROCEDURE — 99204 OFFICE O/P NEW MOD 45 MIN: CPT | Performed by: ORTHOPAEDIC SURGERY

## 2025-06-11 PROCEDURE — 20610 DRAIN/INJ JOINT/BURSA W/O US: CPT | Performed by: ORTHOPAEDIC SURGERY

## 2025-06-11 RX ORDER — KETOROLAC TROMETHAMINE 30 MG/ML
30 INJECTION, SOLUTION INTRAMUSCULAR; INTRAVENOUS ONCE
Status: COMPLETED | OUTPATIENT
Start: 2025-06-11 | End: 2025-06-11

## 2025-06-11 RX ORDER — TRIAMCINOLONE ACETONIDE 40 MG/ML
40 INJECTION, SUSPENSION INTRA-ARTICULAR; INTRAMUSCULAR ONCE
Status: COMPLETED | OUTPATIENT
Start: 2025-06-11 | End: 2025-06-11

## 2025-06-11 RX ORDER — MELOXICAM 15 MG/1
15 TABLET ORAL DAILY
Qty: 14 TABLET | Refills: 1 | Status: SHIPPED | OUTPATIENT
Start: 2025-06-11

## 2025-06-11 RX ADMIN — TRIAMCINOLONE ACETONIDE 40 MG: 40 INJECTION, SUSPENSION INTRA-ARTICULAR; INTRAMUSCULAR at 13:40:00

## 2025-06-11 RX ADMIN — KETOROLAC TROMETHAMINE 30 MG: 30 INJECTION, SOLUTION INTRAMUSCULAR; INTRAVENOUS at 13:40:00

## 2025-06-11 NOTE — PROCEDURES
Bilateral Knee Intra-articular Injection    Name: Jennifer Freeman   MRN: [id number]  Date: 6/11/2025     Clinical Indications:   Persistent knee pain refractory to conservative measures.     After informed consent, the injection site was marked, sterilized with topical chlorhexidine antiseptic, and locally anesthetized with skin refrigerant.    The patient was situation in a comfortable position. Using sterile technique: 1 mL of 30mg/mL of Ketorolac, 2 mL of 0.5% Bupivicaine, 2 mL of 1% Lidocaine, and 1 mL of 40 mg/ml Triamcinolone was injected utilizing anterolateral approach with a 22 gauge needle.  A band-aid was applied.  The patient tolerated the procedure well.        Connor العراقي MD  Knee, Shoulder, & Elbow Surgery / Sports Medicine Specialist  Orthopaedic Surgery  [address] [place] [address].org  [email or web address]  t: [phone]  o: [phone]  f: [phone]

## 2025-06-11 NOTE — H&P
Orthopaedic Surgery  [address] [place] [id number]  [phone]     NEW PATIENT VISIT - HISTORY AND PHYSICAL EXAMINATION     Name: Jennifer Freeman   MRN: [id number]  Date: 6/11/2025     CC: Bilateral Knee Pain    REFERRED BY: Yeison Turner MD    HPI:   Jennifer Freeman is a very pleasant 54 year old female who presents today for an evaluation of significant bilateral knee pain over the past year, which has recently worsened to a severity of 8/10. The pain is localized at the kneecaps and is exacerbated by activities such as climbing stairs. There is no associated swelling, and she has not sustained any knee injuries. Due to the pain, she has reduced her walking activities.     She has been experiencing bilateral knee pain since March 2024, with no known trauma. The pain affects her range of motion and is particularly painful with flexion. It is intermittent but more pronounced at night, with the left knee being more painful than the right. The pain is located on both sides of both knees and also involves the kneecap.    She has been managing the pain with over-the-counter medications like Motrin and topical creams, but the pain has worsened over time. The pain is exacerbated by activities such as carrying her daughter, who has a disability and requires her care 24/7. This responsibility involves frequent movement between rooms, especially at night, which she finds very difficult due to the pain.    She lives at home with her family, works as a , and enjoys travel.    She has a history of diabetes, diagnosed approximately two years ago. Her last A1c was 5.8.    PMH:   Past Medical History[1]    PAST SURGICAL HX:  Past Surgical History[2]    FAMILY HX:  Family History[3]    ALLERGIES:  Epoxy resins and Other    MEDICATIONS: Current Medications[4]    ROS: A comprehensive 14 point review of systems was performed and was negative aside from the aforementioned per history of present  illness.    SOCIAL HX:  Social History     Tobacco Use    Smoking status: Never    Smokeless tobacco: Never   Substance Use Topics    Alcohol use: Yes     Alcohol/week: 1.0 standard drink of alcohol     Types: 1 Glasses of wine per week     Comment: socially       PE:   There were no vitals filed for this visit.  Estimated body mass index is 20.98 kg/m² as calculated from the following:    Height as of 5/8/25: 5' 6\" (1.676 m).    Weight as of 5/8/25: 130 lb.    Physical Exam  Constitutional:       Appearance: Normal appearance.   HENT:      Head: Normocephalic and atraumatic.   Eyes:      Extraocular Movements: Extraocular movements intact.   Neck:      Musculoskeletal: Normal range of motion and neck supple.   Cardiovascular:      Pulses: Normal pulses.   Pulmonary:      Effort: Pulmonary effort is normal. No respiratory distress.   Abdominal:      General: There is no distension.   Skin:     General: Skin is warm.      Capillary Refill: Capillary refill takes less than 2 seconds.      Findings: No bruising.   Neurological:      General: No focal deficit present.      Mental Status: Alert.   Psychiatric:         Mood and Affect: Mood normal.     Examination of the bilateral knees demonstrates:   Skin is intact, warm and dry.   Atrophy: none    Effusion: none    Joint line tenderness: PFJ  Crepitation: none   Queenie: Negative   Patellar mobility: normal without apprehension  J-sign: none    ROM: Extension full  Flexion 140 degrees  ACL:  Negative Lachman, Negative Pivot Shift   PCL:  Negative Posterior Drawer  Collateral Ligaments: Stable to Varus and Valgus stress at 0 and 30 degrees  Strength: mild weakness   Hip joint: normal pain-free ROM   Gait:  normal   Leg length: equal and symmetric  Alignment:  neutral     No obvious peripheral edema noted.   Distal neurovascular exam demonstrates normal perfusion, intact sensation to light touch and full strength.     Examination of the contralateral knee  demonstrates:  No significant atrophy, swelling or effusion. Full range of motion. Neurovascularly intact distally    Radiographic Examination/Diagnostics:  Knee XR personally viewed, independently interpreted and radiology report was reviewed.    PROCEDURE:  XR KNEE ROUTINE (3 VIEWS), LEFT (CPT=73562)  TECHNIQUE:  Three views were obtained including patellar view.  COMPARISON:  None.  INDICATIONS:  PATIENT STATED HISTORY: (As transcribed by Technologist)  Patient complains of bilateral knee pain for over a year. She denies any injury  FINDINGS:    BONES:  Osseous structures are intact.  No significant arthropathy.  SOFT TISSUES:  No visible soft tissue swelling.  EFFUSION:  Small suprapatellar joint effusion.  CONCLUSION:    1. Small suprapatellar joint effusion.  LOCATION:  [facility]  Dictated by (CST): Ju Mcdermott MD on 5/08/2025 at 1:09 PM      Finalized by (CST): Ju Mcdermott MD on 5/08/2025 at 1:10 PM     PROCEDURE:  XR KNEE ROUTINE (3 VIEWS), RIGHT (CPT=73562)  TECHNIQUE:  Three views were obtained including patellar view.  COMPARISON:  [facility], XR, KNEE (AP   LATERAL),RIGHT XRAY, 7/26/2010, 7:06 PM.  INDICATIONS:  PATIENT STATED HISTORY: (As transcribed by Technologist)  Patient complains of bilateral knee pain for over a year. She denies any injury  FINDINGS:    BONES:  Osseous structures are intact.  Mild joint space narrowing with small marginal osteophytes involves the medial compartment and patellar femoral joint consistent with degenerative change.  SOFT TISSUES:  No visible soft tissue swelling.  EFFUSION:  Small suprapatellar joint effusion.  CONCLUSION:    1. Mild degenerative change.  LOCATION:  [facility]  Dictated by (CST): Ju Mcdermott MD on 5/08/2025 at 1:23 PM      Finalized by (CST): Ju Mcdermott MD on 5/08/2025 at 1:24 PM      Subtle chondral wear. Overall well appearing joints.     IMPRESSION: Jennifer Freeman is a 54 year old female with bilateral patellofemoral arthralgia.    We elected to maximize  conservative treatment with corticosteroid and ketorolac injection coupled with physical therapy.    PLAN:   We had a detailed discussion outlining the etiology, anatomy, pathophysiology, and natural history of the patient's findings. Imaging was reviewed in detail and correlated to a 3-dimensional model of the knee.     We reviewed the treatment of this disease condition.  Fortunately, treatment is amenable to conservative treatment which we chose to optimize at today's visit.  After a discussion of a variety of conservative treatment options, I recommended intra-articular injection with corticosteroid and ketorolac coupled with physical therapy to aid in strengthening, range of motion, functional improvement, and return to baseline activity.      We elected to proceed with the injection procedure at today's visit. We discussed the risk and benefits of the procedure; including, but not limited to: infection, injury to blood vessels, nerve injury, prolonged pain, swelling, site soreness, failure to progress, and need for advanced treatments.  The patient voiced understanding and agreed to proceed with the treatment plan.     The patient had the opportunity to ask questions and all questions were answered appropriately.       FOLLOW-UP:  Return to clinic on an as needed basis.       Connor العراقي MD  Knee, Shoulder, & Elbow Surgery / Sports Medicine Specialist  Orthopaedic Surgery  [address] [place] [address].org  [email or web address]  t: [phone]  o: [phone]  f: [phone]    This note was dictated using Dragon software.  While it was briefly proofread prior to completion, some grammatical, spelling, and word choice errors due to dictation may still occur.    The patient verbally consented to be recorded using ambient AI listening technology and understand that they can each withdraw their consent to this listening technology at any point by asking the clinician to  turn off or pause the recording:         [1]   Past Medical History:   Anxiety    when mother was dx and  of pancreatic CA    Arthritis    Back pain    Bloating    Change in hair    Constipation    Diabetes (HCC)    Essential hypertension    Fibroids    Flatulence/gas pain/belching    Food intolerance    Frequent urination    Headache disorder    Heart palpitations    Heartburn    High blood pressure    High cholesterol    Hyperlipidemia    Indigestion    Leaking of urine    Leg swelling    Migraines    Nausea    Night sweats    Pain with bowel movements    Painful swallowing    Pap smear for cervical cancer screening    a year and half ago    Rash    Stress    Visual impairment    contacts/glasses    Wears glasses   [2]   Past Surgical History:  Procedure Laterality Date    Anterior repair  2022    Enterocele repair  2022    Hysterectomy      Midurethral sling  2022    Posterior repair  2022    Uterosacral ligament suspension  2022    Vaginal hysterectomy  2022    with bilateral salpingectomy   [3]   Family History  Problem Relation Age of Onset    Hypertension Mother     Cancer Mother 38        Uterine, Pancreatic    Colon Polyps Mother     Diabetes Father     Heart Attack Father     Other (prediabetes) Brother     Heart Disorder Maternal Grandmother     Heart Disorder Maternal Grandfather     Heart Disorder Paternal Grandmother     Other (Other) Paternal Grandfather     Uterine Cancer Maternal Aunt     Ovarian Cancer Maternal Aunt 45    Uterine Cancer Paternal Aunt     Breast Cancer Maternal Cousin Female 38    Breast Cancer Paternal Cousin Female 48    Cerebral Palsy Daughter    [4]   Current Outpatient Medications   Medication Sig Dispense Refill    Continuous Glucose Sensor (FREESTYLE EARNEST 3 PLUS SENSOR) Does not apply Misc 1 each As Directed. 1 sensor for every 15 days. 2 each 5    fexofenadine 180 MG Oral Tab Take 1 tablet (180 mg total) by mouth daily.       Continuous Glucose Sensor (FREESTYLE EARNEST 3 SENSOR) Does not apply Misc 1 each every 14 (fourteen) days. 2 each 5    omeprazole 20 MG Oral Capsule Delayed Release Take 1 capsule (20 mg total) by mouth daily as needed. Take one capsule (20 mg total) by mouth once daily, 30 minutes prior to breakfast. 30 capsule 2    metFORMIN 500 MG Oral Tab Take 1 tablet (500 mg total) by mouth daily with breakfast. 90 tablet 0    lisinopril 2.5 MG Oral Tab Take 1 tablet (2.5 mg total) by mouth daily.      Glucose Blood (CONTOUR NEXT TEST) In Vitro Strip Use to test once daily. 100 each 1    acidophilus-pectin Oral Cap Take 1 capsule by mouth daily.      MAGNESIUM OR Take by mouth.      NATURAL PSYLLIUM FIBER OR Take by mouth daily.      simvastatin 10 MG Oral Tab Take 1 tablet (10 mg total) by mouth nightly.      Metoprolol Succinate ER 25 MG Oral Tablet 24 Hr Take 0.5 tablets (12.5 mg total) by mouth daily.

## 2025-06-16 ENCOUNTER — OFFICE VISIT (OUTPATIENT)
Dept: OBGYN CLINIC | Facility: CLINIC | Age: 55
End: 2025-06-16
Payer: COMMERCIAL

## 2025-06-16 VITALS
WEIGHT: 132 LBS | SYSTOLIC BLOOD PRESSURE: 108 MMHG | DIASTOLIC BLOOD PRESSURE: 72 MMHG | HEIGHT: 66 IN | BODY MASS INDEX: 21.21 KG/M2

## 2025-06-16 DIAGNOSIS — Z01.419 WELL WOMAN EXAM WITH ROUTINE GYNECOLOGICAL EXAM: Primary | ICD-10-CM

## 2025-06-16 NOTE — PROGRESS NOTES
[facility]  Obstetrics and Gynecology  History & Physical    CC: Patient presents for a well woman exam     Subjective:     HPI: Jennifer Freeman is a 54 year old  female here for a well women exam. Patient reports overall doing well.  Reports noticing some hair loss.  Also has some hot flashes at night.  Overall symptoms are manageable.    OB History:  OB History    Para Term  AB Living   2 2 1 1  2   SAB IAB Ectopic Multiple Live Births       2      # Outcome Date GA Lbr Ronaldo/2nd Weight Sex Type Anes PTL Lv   2 Term 95 40w0d  8 lb (3.629 kg) F Vag-Spont   MERON      Birth Comments: Cerebral Palsy   1  94 36w0d  4 lb 0.5 oz (1.829 kg) F Vag-Spont EPI  MERON       Gyne History:  Hx Prior Abnormal Pap: Yes  Pap Date: 18  Pap Result Notes: wnl  Patient's last menstrual period was 2022 (approximate).  Hx of TVH w/ BS 2022 by Dr. David.     GIBRAN, HPV neg 2018  denies history of STDs (non-HPV).   Sexual history: Active? yes  Birth control? None     Meds:  Current Outpatient Medications on File Prior to Visit   Medication Sig Dispense Refill    Meloxicam 15 MG Oral Tab Take 1 tablet (15 mg total) by mouth daily. 14 tablet 1    Continuous Glucose Sensor (FREESTYLE EARNEST 3 PLUS SENSOR) Does not apply Misc 1 each As Directed. 1 sensor for every 15 days. 2 each 5    fexofenadine 180 MG Oral Tab Take 1 tablet (180 mg total) by mouth daily.      Continuous Glucose Sensor (FREESTYLE EARNEST 3 SENSOR) Does not apply Misc 1 each every 14 (fourteen) days. 2 each 5    omeprazole 20 MG Oral Capsule Delayed Release Take 1 capsule (20 mg total) by mouth daily as needed. Take one capsule (20 mg total) by mouth once daily, 30 minutes prior to breakfast. 30 capsule 2    metFORMIN 500 MG Oral Tab Take 1 tablet (500 mg total) by mouth daily with breakfast. 90 tablet 0    lisinopril 2.5 MG Oral Tab Take 1 tablet (2.5 mg total) by mouth daily.      Glucose Blood (CONTOUR NEXT TEST)  In Vitro Strip Use to test once daily. 100 each 1    acidophilus-pectin Oral Cap Take 1 capsule by mouth daily.      MAGNESIUM OR Take by mouth.      NATURAL PSYLLIUM FIBER OR Take by mouth daily.      simvastatin 10 MG Oral Tab Take 1 tablet (10 mg total) by mouth nightly.      Metoprolol Succinate ER 25 MG Oral Tablet 24 Hr Take 0.5 tablets (12.5 mg total) by mouth daily.       No current facility-administered medications on file prior to visit.       All:  Allergies   Allergen Reactions    Epoxy Resins RASH    Other RASH     Patients she recently had allergy skin testing and is allergic Epoxy  - patient states unsure of exposure - but had a rash on face       PMH:  Past Medical History:    Anxiety    when mother was dx and  of pancreatic CA    Arthritis    Back pain    Bloating    Change in hair    Constipation    Diabetes (HCC)    Essential hypertension    Fibroids    Flatulence/gas pain/belching    Food intolerance    Frequent urination    Headache disorder    Heart palpitations    Heartburn    High blood pressure    High cholesterol    Hyperlipidemia    Indigestion    Leaking of urine    Leg swelling    Migraines    Nausea    Night sweats    Pain with bowel movements    Painful swallowing    Pap smear for cervical cancer screening    a year and half ago    Rash    Stress    Visual impairment    contacts/glasses    Wears glasses       Immunization History:   Immunization History   Administered Date(s) Administered    Covid-19 Vaccine Moderna 100 mcg/0.5 ml 2021    Influenza 2016, 2017, 2018       PSH:  Past Surgical History:   Procedure Laterality Date    Anterior repair  2022    Enterocele repair  2022    Hysterectomy      Midurethral sling  2022    Posterior repair  2022    Uterosacral ligament suspension  2022    Vaginal hysterectomy  2022    with bilateral salpingectomy       Social History:  Social History     Socioeconomic History    Marital  status:      Spouse name: Not on file    Number of children: Not on file    Years of education: Not on file    Highest education level: Not on file   Occupational History    Not on file   Tobacco Use    Smoking status: Never    Smokeless tobacco: Never   Vaping Use    Vaping status: Never Used   Substance and Sexual Activity    Alcohol use: Yes     Alcohol/week: 1.0 standard drink of alcohol     Types: 1 Glasses of wine per week     Comment: socially    Drug use: No    Sexual activity: Yes     Partners: Male   Other Topics Concern     Service Not Asked    Blood Transfusions Not Asked    Caffeine Concern Yes     Comment: 1 cup coffee per day    Occupational Exposure Not Asked    Hobby Hazards Not Asked    Sleep Concern Not Asked    Stress Concern Not Asked    Weight Concern Not Asked    Special Diet Not Asked    Back Care Not Asked    Exercise Yes     Comment: 2-3 x week, walking    Bike Helmet Not Asked    Seat Belt Yes    Self-Exams Not Asked   Social History Narrative    Not on file     Social Drivers of Health     Food Insecurity: No Food Insecurity (6/16/2025)    NCSS - Food Insecurity     Worried About Running Out of Food in the Last Year: No     Ran Out of Food in the Last Year: No   Transportation Needs: No Transportation Needs (6/16/2025)    NCSS - Transportation     Lack of Transportation: No   Stress: Not on file   Housing Stability: Not At Risk (6/16/2025)    NCSS - Housing/Utilities     Has Housing: Yes     Worried About Losing Housing: No     Unable to Get Utilities: No         Patient feels unsafe or threatened?:  denies    Abuse:  denies physical, sexual or mental.     Family History:  Family History   Problem Relation Age of Onset    Hypertension Mother     Cancer Mother 38        Uterine, Pancreatic    Colon Polyps Mother     Diabetes Father     Heart Attack Father     Other (prediabetes) Brother     Heart Disorder Maternal Grandmother     Heart Disorder Maternal Grandfather     Heart  Disorder Paternal Grandmother     Other (Other) Paternal Grandfather     Uterine Cancer Maternal Aunt     Ovarian Cancer Maternal Aunt 45    Uterine Cancer Paternal Aunt     Breast Cancer Maternal Cousin Female 38    Breast Cancer Paternal Cousin Female 48    Cerebral Palsy Daughter        Health maintenance:  Mammogram (age 38 and q1-2yr):   ADDENDUM:      Pathology demonstrates stromal fibrosis with calcifications.  No evidence of malignancy.  These findings are concordant.  A six-month left breast diagnostic mammogram is recommended     Dictated by (CST): Mario Kasper MD on 3/27/2024 at 10:13 AM       Finalized by (CST): Mario Kasper MD on 3/27/2024 at 10:16 AM                    Addended by Ranjith Martin MD on 3/27/2024 10:16 AM     Colonoscopy (age 43 and q10yr): 2021, return in 10 years per patient.     Review of Systems:  General: no complaints per category. See HPI for additional information.   Breast: no complaints per category. See HPI for additional information.   Respiratory: no complaints per category. See HPI for additional information.   Cardiovascular: no complaints per category. See HPI for additional information.   GI: no complaints per category. See HPI for additional information.   : no complaints per category. See HPI for additional information.   Heme: no complaints per category. See HPI for additional information.       Objective:     Vitals:    06/16/25 1252   BP: 108/72   Weight: 132 lb (59.9 kg)   Height: 66\"         Body mass index is 21.31 kg/m².    General: AAO.NAD.   CVS exam: normal peripheral perfusion  Chest: non-labored breathing, no tachypnea   Breast:  symmetric, no dominant or suspicious mass, no skin or nipple changes and no axillary adenopathy  Abdominal exam:  soft, nontender, nondistended  Pelvic exam:   VULVA: normal appearing vulva with no masses, tenderness or lesions  PERINEUM:  normal appearing, no lesions   URETHRAL MEATUS:  normal appearing, no lesions   VAGINA:  normal appearing vagina with normal color and discharge, no lesions  CERVIX: surgically absent  UTERUS: surgically absent, vaginal cuff well healed  ADNEXA: normal adnexa in size, nontender and no masses  PERIRECTAL:  normal appearing, no lesions   Ext: non-tender, no edema    Assessment:     Jennifer Freeman is a 54 year old  female here for a well women exam.       Plan:       Problem List Items Addressed This Visit    None  Visit Diagnoses         Well woman exam with routine gynecological exam    -  Primary            Cervical cancer screening  - discussion held with the patient about ASCCP guidelines  - repeat pap smear not indicated   Health maintenance  - encouraged to maintain weight at healthy BMI  - discussed importance of exercise and healthy eating  - self breast exam instructions provided   - bilateral screening mammogram recommendations discussed and order provided by PCP   - Menopausal symptoms reviewed and discussed with patient.  Precautions provided.      Problem List Items Addressed This Visit    None  Visit Diagnoses         Well woman exam with routine gynecological exam    -  Primary                  All of the findings and plan were discussed with the patient.  She notes understanding and agrees with the plan of care.  All questions were answered to the best of my ability at this time.      RTC in 1 year for a well woman exam or sooner if needed     Kathy Borjas MD   [facility] - OBGYN      Discussed with patient that there will not be further notification of normal or benign results other than receiving results on [employer]. A [employer] message or telephone call will be placed by the physician and/or office staff if results are abnormal.       Note to patient and family   The 21st Century Cures Act makes medical notes available to patients in the interest of transparency.  However, please be advised that this is a medical document.  It is intended as [id number] communication.  It is written and  medical language may contain abbreviations or verbiage that are technical and unfamiliar.  It may appear blunt or direct.  Medical documents are intended to carry relevant information, facts as evident, and the clinical opinion of the practitioner.      This note could include assistance by Dragon voice recognition. Errors in content may be related to improper recognition by the system; efforts to review and correct have been done but errors may still exist.

## 2025-07-14 ENCOUNTER — OFFICE VISIT (OUTPATIENT)
Dept: FAMILY MEDICINE CLINIC | Facility: CLINIC | Age: 55
End: 2025-07-14
Payer: COMMERCIAL

## 2025-07-14 VITALS
SYSTOLIC BLOOD PRESSURE: 116 MMHG | RESPIRATION RATE: 16 BRPM | TEMPERATURE: 98 F | BODY MASS INDEX: 22 KG/M2 | OXYGEN SATURATION: 98 % | HEART RATE: 81 BPM | WEIGHT: 134.81 LBS | DIASTOLIC BLOOD PRESSURE: 82 MMHG

## 2025-07-14 DIAGNOSIS — J06.9 VIRAL URI WITH COUGH: Primary | ICD-10-CM

## 2025-07-14 PROCEDURE — 99213 OFFICE O/P EST LOW 20 MIN: CPT | Performed by: NURSE PRACTITIONER

## 2025-07-14 RX ORDER — BENZONATATE 200 MG/1
200 CAPSULE ORAL 3 TIMES DAILY PRN
Qty: 30 CAPSULE | Refills: 0 | Status: SHIPPED | OUTPATIENT
Start: 2025-07-14

## 2025-07-14 NOTE — PROGRESS NOTES
The following individual(s) verbally consented to be recorded using ambient AI listening technology and understand that they can each withdraw their consent to this listening technology at any point by asking the clinician to turn off or pause the recording:    Patient name: Jennifer Freeman    Subjective:   Jennifer Freeman is a 54 year old female who presents for Ear Problem (Bilateral ear popping for 3 days. Mucinex and Ibuprofen taken.)     History/Other:   History of Present Illness  Jennifer Freeman is a 54 year old female who presents with sore throat, headache, and ear discomfort.    Pharyngitis and upper respiratory symptoms  - Sore throat began three days ago on Friday afternoon on 07/11/2025, described as 'irritated'  - Persistent cough present  - No fever, nausea, vomiting, or diarrhea    Headache  - Headache onset three days ago, following sore throat  - Ibuprofen used for headache relief    Otologic symptoms  - Ear discomfort began after sore throat onset  - Sensation of popping in the ears    Ocular symptoms  - Eyes have been watering slightly    Symptom trajectory and exposures  - Symptoms began after family members ( and daughter) experienced similar illness  - Family COVID-19 tests were negative    Symptom management  - Mucinex (daytime and nighttime formulations) and Flonase nasal spray used for symptom relief  - Ibuprofen used for headache  - No missed work due to symptoms  - No work note required     Chief Complaint Reviewed and Verified  Nursing Notes Reviewed and   Verified  OB Status Reviewed         Tobacco:  She has never smoked tobacco.    Current Medications[1]      Review of Systems:  Review of Systems   Constitutional: Negative.    HENT:  Positive for congestion, ear pain, postnasal drip, rhinorrhea, sinus pressure and sore throat. Negative for ear discharge and hearing loss.    Eyes:         Watery eyes   Respiratory:  Positive for cough. Negative for shortness of breath and  wheezing.    Cardiovascular: Negative.    Gastrointestinal: Negative.    Musculoskeletal: Negative.    Skin: Negative.    Neurological: Negative.        Objective:   /82   Pulse 81   Temp 98 °F (36.7 °C) (Oral)   Resp 16   Wt 134 lb 12.8 oz (61.1 kg)   LMP 04/25/2022 (Approximate)   SpO2 98%   BMI 21.76 kg/m²  Estimated body mass index is 21.76 kg/m² as calculated from the following:    Height as of an earlier encounter on 7/14/25: 5' 6\" (1.676 m).    Weight as of this encounter: 134 lb 12.8 oz (61.1 kg).  Physical Exam  GENERAL: Alert, cooperative, well developed, no acute distress  HEENT: Normocephalic, normal oropharynx, moist mucous membranes, no cervical adenopathy, ears clear bilaterally, nasal mucosa normal, pharynx normal  CHEST: Clear to auscultation bilaterally, no wheezes, rhonchi, or crackles  CARDIOVASCULAR: Normal heart rate and rhythm, S1 and S2 normal without murmurs  ABDOMEN: Soft, non-tender, non-distended, without organomegaly, normal bowel sounds  EXTREMITIES: No cyanosis or edema  NEUROLOGICAL: Cranial nerves grossly intact, moves all extremities without gross motor or sensory deficit      Assessment & Plan:   1. Viral URI with cough (Primary)  -     Benzonatate; Take 1 capsule (200 mg total) by mouth 3 (three) times daily as needed.  Dispense: 30 capsule; Refill: 0    Assessment & Plan  Upper Respiratory Tract Infection (URTI)  Symptoms consistent with viral URTI. No bacterial infection. Expected improvement within two weeks.  - Continue Mucinex for daytime relief.  - Use Flonase for ear popping and congestion.  - Take ibuprofen as needed for headache and facial pressure.  - Increase fluid intake.  - Prescribe cough suppressant for nighttime if needed.      Return if symptoms worsen or fail to improve.      Lila Land, APRN, 7/14/2025, 11:44 AM                [1]   Current Outpatient Medications   Medication Sig Dispense Refill    benzonatate 200 MG Oral Cap Take 1 capsule (200 mg  total) by mouth 3 (three) times daily as needed. 30 capsule 0    Meloxicam 15 MG Oral Tab Take 1 tablet (15 mg total) by mouth daily. 14 tablet 1    Continuous Glucose Sensor (FREESTYLE EARNEST 3 PLUS SENSOR) Does not apply Misc 1 each As Directed. 1 sensor for every 15 days. 2 each 5    fexofenadine 180 MG Oral Tab Take 1 tablet (180 mg total) by mouth daily.      Continuous Glucose Sensor (FREESTYLE EARNEST 3 SENSOR) Does not apply Misc 1 each every 14 (fourteen) days. 2 each 5    metFORMIN 500 MG Oral Tab Take 1 tablet (500 mg total) by mouth daily with breakfast. 90 tablet 0    lisinopril 2.5 MG Oral Tab Take 1 tablet (2.5 mg total) by mouth daily.      Glucose Blood (CONTOUR NEXT TEST) In Vitro Strip Use to test once daily. 100 each 1    acidophilus-pectin Oral Cap Take 1 capsule by mouth daily.      MAGNESIUM OR Take by mouth.      NATURAL PSYLLIUM FIBER OR Take by mouth daily.      simvastatin 10 MG Oral Tab Take 1 tablet (10 mg total) by mouth nightly.      Metoprolol Succinate ER 25 MG Oral Tablet 24 Hr Take 0.5 tablets (12.5 mg total) by mouth daily.

## 2025-07-15 ENCOUNTER — OFFICE VISIT (OUTPATIENT)
Facility: CLINIC | Age: 55
End: 2025-07-15
Payer: COMMERCIAL

## 2025-07-15 VITALS
BODY MASS INDEX: 21.44 KG/M2 | SYSTOLIC BLOOD PRESSURE: 115 MMHG | WEIGHT: 133.38 LBS | HEART RATE: 89 BPM | OXYGEN SATURATION: 97 % | DIASTOLIC BLOOD PRESSURE: 75 MMHG | HEIGHT: 66 IN

## 2025-07-15 DIAGNOSIS — E11.59 HYPERTENSION ASSOCIATED WITH TYPE 2 DIABETES MELLITUS (HCC): ICD-10-CM

## 2025-07-15 DIAGNOSIS — E11.69 HYPERLIPIDEMIA ASSOCIATED WITH TYPE 2 DIABETES MELLITUS (HCC): ICD-10-CM

## 2025-07-15 DIAGNOSIS — E78.5 HYPERLIPIDEMIA ASSOCIATED WITH TYPE 2 DIABETES MELLITUS (HCC): ICD-10-CM

## 2025-07-15 DIAGNOSIS — I15.2 HYPERTENSION ASSOCIATED WITH TYPE 2 DIABETES MELLITUS (HCC): ICD-10-CM

## 2025-07-15 DIAGNOSIS — E11.9 TYPE 2 DIABETES MELLITUS TREATED WITHOUT INSULIN (HCC): Primary | ICD-10-CM

## 2025-07-15 LAB — HEMOGLOBIN A1C: 5.9 % (ref 4.3–5.6)

## 2025-07-15 PROCEDURE — 95251 CONT GLUC MNTR ANALYSIS I&R: CPT | Performed by: NURSE PRACTITIONER

## 2025-07-15 PROCEDURE — 83036 HEMOGLOBIN GLYCOSYLATED A1C: CPT | Performed by: NURSE PRACTITIONER

## 2025-07-15 PROCEDURE — 99214 OFFICE O/P EST MOD 30 MIN: CPT | Performed by: NURSE PRACTITIONER

## 2025-07-15 RX ORDER — HYDROCHLOROTHIAZIDE 12.5 MG/1
1 CAPSULE ORAL
Qty: 6 EACH | Refills: 5 | Status: SHIPPED | OUTPATIENT
Start: 2025-07-15

## 2025-07-15 RX ORDER — SIMVASTATIN 20 MG
20 TABLET ORAL NIGHTLY
Qty: 90 TABLET | Refills: 0 | Status: SHIPPED | OUTPATIENT
Start: 2025-07-15

## 2025-07-15 NOTE — PROGRESS NOTES
[facility] Note    Name: Jennifer Freeman    Date: 07/15/25      Chief complaint: Follow - Up (Pt presents for DM follow up. Repeat A1C completed in clinic.  Complains of inaccurate readings with Sreekanth sensor. Double checks with finger sticks. )       Subjective:   foot, lipid ordered by PCP , eye exam   History of Present Illness  Jennifer Freeman is a 54 year old female with type 2 diabetes who presents for a follow-up visit.    Glycemic control and type 2 diabetes mellitus  - Type 2 diabetes managed with metformin 500 mg three times a week due to previous hypoglycemia with daily dosing  - Most recent hemoglobin A1c is 5.9%, increased from 5.8% at last visit  - Blood glucose monitored with Sreekanth sensor, confirmed by finger sticks, with readings between 99 and 100 mg/dL  - Diet includes fiber-rich foods such as nopales and zucchini; no soda consumption for ten years  - Engages in daily physical activity: walking 15-20 minutes and using light weights at home    Gastrointestinal symptoms  - Occasional diarrhea attributed to cholecystectomy performed two years ago    Neurological symptoms  - History of headaches, including an episode in early March requiring emergency department evaluation and attributed to congestion  - Occasional numbness and swelling in feet, not present recently    Musculoskeletal pain  - Received cortisone injections with Toradol and lidocaine in both knees approximately six weeks ago for knee pain  - Awaiting gel treatment for knees    Hyperlipidemia management  - Cholesterol managed with simvastatin 10 mg, occasionally missed one dose per week  - LDL cholesterol was 78 mg/dL last year  - No use of omega-3 or fish oil supplements    Hypertension management  - Blood pressure managed with metoprolol succinate 25 mg daily  - Lisinopril not taken for the past four years  - Home blood pressure readings generally within normal range; checks blood pressure when experiencing headaches    DM meds  at office visit:      metFORMIN 500 MG Oral Tab (Taking) Taking 1 tab about 3x a week     Take 1 tablet (500 mg total) by mouth daily with breakfast.         Continuous Glucose Monitoring Interpretation  Jennifer Freeman has undergone continuous glucose monitoring.  The blood glucose tracings were evaluated for two weeks prior to office visit. Blood glucose tracings demonstrated no significant hyperglycemia. There were occasional hypoglycemia, particularly before breakfast and pre and post-dinner during the weeks of evaluation.        History/Other:    Allergies, PMH, SocHx and FHx reviewed and updated as appropriate in Epic on    empagliflozin (JARDIANCE) 10 MG Oral Tab Take 1 tablet (10 mg total) by mouth daily. 90 tablet 0    simvastatin 20 MG Oral Tab Take 1 tablet (20 mg total) by mouth nightly. 90 tablet 0    Continuous Glucose Sensor (FREESTYLE EARNEST 3 PLUS SENSOR) Does not apply Misc 1 each every 15 (fifteen) days. 1 sensor for every 15 days. 6 each 5    benzonatate 200 MG Oral Cap Take 1 capsule (200 mg total) by mouth 3 (three) times daily as needed. 30 capsule 0    Meloxicam 15 MG Oral Tab Take 1 tablet (15 mg total) by mouth daily. 14 tablet 1    fexofenadine 180 MG Oral Tab Take 1 tablet (180 mg total) by mouth daily.      lisinopril 2.5 MG Oral Tab Take 1 tablet (2.5 mg total) by mouth daily.      Glucose Blood (CONTOUR NEXT TEST) In Vitro Strip Use to test once daily. 100 each 1    acidophilus-pectin Oral Cap Take 1 capsule by mouth daily.      MAGNESIUM OR Take by mouth.      NATURAL PSYLLIUM FIBER OR Take by mouth daily.      Metoprolol Succinate ER 25 MG Oral Tablet 24 Hr Take 0.5 tablets (12.5 mg total) by mouth daily.       Allergies   Allergen Reactions    Epoxy Resins RASH    Other RASH     Patients she recently had allergy skin testing and is allergic Epoxy  - patient states unsure of exposure - but had a rash on face     Current Outpatient Medications   Medication Sig Dispense Refill     empagliflozin (JARDIANCE) 10 MG Oral Tab Take 1 tablet (10 mg total) by mouth daily. 90 tablet 0    simvastatin 20 MG Oral Tab Take 1 tablet (20 mg total) by mouth nightly. 90 tablet 0    Continuous Glucose Sensor (FREESTYLE EARNEST 3 PLUS SENSOR) Does not apply Misc 1 each every 15 (fifteen) days. 1 sensor for every 15 days. 6 each 5    benzonatate 200 MG Oral Cap Take 1 capsule (200 mg total) by mouth 3 (three) times daily as needed. 30 capsule 0    Meloxicam 15 MG Oral Tab Take 1 tablet (15 mg total) by mouth daily. 14 tablet 1    fexofenadine 180 MG Oral Tab Take 1 tablet (180 mg total) by mouth daily.      lisinopril 2.5 MG Oral Tab Take 1 tablet (2.5 mg total) by mouth daily.      Glucose Blood (CONTOUR NEXT TEST) In Vitro Strip Use to test once daily. 100 each 1    acidophilus-pectin Oral Cap Take 1 capsule by mouth daily.      MAGNESIUM OR Take by mouth.      NATURAL PSYLLIUM FIBER OR Take by mouth daily.      Metoprolol Succinate ER 25 MG Oral Tablet 24 Hr Take 0.5 tablets (12.5 mg total) by mouth daily.       Past Medical History:    Allergic rhinitis    Anxiety    when mother was dx and  of pancreatic CA    Arthritis    Back pain    Bloating    Change in hair    Constipation    Diabetes (HCC)    Essential hypertension    Fibroids    Flatulence/gas pain/belching    Food intolerance    Frequent urination    Headache disorder    Heart palpitations    Heartburn    Hepatitis    High blood pressure    High cholesterol    Hyperlipidemia    Hyperlipidemia associated with type 2 diabetes mellitus (HCC)    Indigestion    Leaking of urine    Leg swelling    Migraines    Nausea    Night sweats    Pain with bowel movements    Painful swallowing    Pap smear for cervical cancer screening    a year and half ago    Rash    Stress    Visual impairment    contacts/glasses    Wears glasses     Family History   Problem Relation Age of Onset    Hypertension Mother     Cancer Mother 38        Uterine, Pancreatic    Colon  Polyps Mother     Polyps Mother     Diabetes Father     Heart Attack Father     Other (prediabetes) Brother     Heart Disorder Maternal Grandmother     Heart Disorder Maternal Grandfather     Heart Disorder Paternal Grandmother     Other (Other) Paternal Grandfather     Uterine Cancer Maternal Aunt     Ovarian Cancer Maternal Aunt 45    Uterine Cancer Paternal Aunt     Breast Cancer Maternal Cousin Female 38    Breast Cancer Paternal Cousin Female 48    Cerebral Palsy Daughter      Social history: Reviewed.      ROS/Exam    REVIEW OF SYSTEMS: Ten point review of systems has been performed and is otherwise negative and/or non-contributory, except as described above.     VITALS  Vitals:    07/15/25 0822 07/15/25 0902   BP: 120/82 115/75   BP Location: Left arm    Patient Position: Sitting    Cuff Size: adult    Pulse: 89    SpO2: 97%    Weight: 133 lb 6.4 oz (60.5 kg)    Height: 5' 6\" (1.676 m)        Body mass index is 21.53 kg/m².  Wt Readings from Last 6 Encounters:   07/15/25 133 lb 6.4 oz (60.5 kg)   07/14/25 134 lb 12.8 oz (61.1 kg)   07/14/25 134 lb 12.8 oz (61.1 kg)   06/16/25 132 lb (59.9 kg)   06/11/25 130 lb (59 kg)   05/08/25 130 lb (59 kg)       PHYSICAL EXAM  CONSTITUTIONAL:  awake, alert, cooperative, no apparent distress, and appears stated age   PSYCH: normal affect  LUNGS: breathing comfortably  CARDIOVASCULAR:  regular rate   NECK:  no palpable thyroid nodules     Labs/Imaging:     Lab Results   Component Value Date    CHOLEST 154 09/26/2024    CHOLEST 143 03/07/2024    TRIG 125 09/26/2024    TRIG 75 03/07/2024    HDL 54 09/26/2024    HDL 57 03/07/2024    LDL 78 09/26/2024    LDL 71 03/07/2024     Lab Results   Component Value Date    MICROALBCREA  03/18/2025      Comment:      Unable to calculate due to Urine Microalbumin <0.3 mg/dL          Lab Results   Component Value Date    CREATSERUM 0.71 03/04/2025    CREATSERUM 0.78 07/25/2024    EGFRCR 100 03/04/2025    EGFRCR 90 07/25/2024     Lab Results    Component Value Date    AST 23 03/04/2025    AST 21 07/25/2024    ALT 23 03/04/2025    ALT 24 07/25/2024       Overall glucose control:  Lab Results   Component Value Date    A1C 5.9 (A) 07/15/2025    A1C 5.8 (A) 03/18/2025    A1C 5.8 (A) 09/26/2024    A1C 6.5 (H) 03/07/2024       Supplementary Documentation:   -Surveillance for Diabetes Complications & Risks  Foot exam/neuropathy: Last Foot Exam: 09/26/24    Retinopathy screening: No data recordedNo data recorded  Lens josy in [place], [place] - Sept of 2024     Lipid screening:   Lab Results   Component Value Date    LDL 78 09/26/2024    TRIG 125 09/26/2024   Cholesterol Meds: simvastatin Tabs - 20 MG    Not taking daily      Nephropathy screening:   Lab Results   Component Value Date    EGFRCR 100 03/04/2025    MICROALBCREA  03/18/2025      Comment:      Unable to calculate due to Urine Microalbumin <0.3 mg/dL       No results found for: \"CREAUR\", \"MICROALBUMIN\", \"MALBCREACALC\"  Blood pressure control:   BP Readings from Last 1 Encounters:   07/15/25 115/75   BP Meds: lisinopril Tabs - 2.5 MG; metoprolol succinate ER Tb24 - 25 MG       Assessment & Plan:   Overview:   1. Type 2 diabetes mellitus treated without insulin (HCC) (Primary)  Overview:  DM hx:  -Diagnosed with diabetes in March, 2024  -Family history- yes, father     Previously trialed/failed DM meds: none     - C peptide detected last 9/26/24       Complications: past hx of atrial tachycardia  Orders:  -     POC Hemoglobin A1C  -     GLUC MNTR CONT REC FROM NTRSTL TISS FLU PHYS I&R  -     Empagliflozin; Take 1 tablet (10 mg total) by mouth daily.  Dispense: 90 tablet; Refill: 0  -     FreeStyle Sreekanth 3 Plus Sensor; 1 each every 15 (fifteen) days. 1 sensor for every 15 days.  Dispense: 6 each; Refill: 5  2. Hyperlipidemia associated with type 2 diabetes mellitus (HCC)  -     Simvastatin; Take 1 tablet (20 mg total) by mouth nightly.  Dispense: 90 tablet; Refill: 0  3. Hypertension associated with type 2  diabetes mellitus (HCC)    Assessment & Plan  Type 2 Diabetes Mellitus  Type 2 Diabetes Mellitus with current A1c of 5.9, slightly increased from previous 5.8. Currently taking metformin 500 mg three times a week due to previous hypoglycemia when taken daily. Considering switch to Jardiance for additional renal and cardiac protection, shabnam with past hx of atrial tachycardia.  Discussed Goal of a1c <7%. Importance of better glucose control in preventing onset/progression of end-organ damage discussed, as well as goals of therapy and clinical significance of A1C. Discussed targeted glucose levels Plus symptoms and treatment of hypoglycemia w/ 15/15 rule for glucose 55-70 and 30/15 rule w/ glucose < 54. Also to f/u w/ protein or meal after glucose went up to 80. Discussed to patient to have lens crafter send US the note  Med changes:   - Switch metformin to Jardiance 10 mg, to be taken three times a week in the morning.  - Monitor blood glucose levels using Sreekanth sensor.  - Educate on potential side effects of Jardiance, including risk of dehydration and urinary tract infections.  - Advise to increase water intake and maintain good hygiene after urination to prevent infections.  - Instruct to avoid Jardiance on days of alcohol consumption or if experiencing dehydration symptoms.  - continue [place] Sreekanth 3+ CGM with phone (connected to clinic profile)  - the patient is not on insulin and does not have history of recurrent hypoglycemia, CGM ordered, likely will have to pay out of pocket for CGM  - continue to check BG 1-2x/day using glucometer or CGM  - SGLT2i instructions provided re: surgery, times of illness/dehydration        Hyperlipidemia  Hyperlipidemia with previous LDL of 78 mg/dL. Currently on simvastatin 10 mg daily, but often missed doses. Plan to increase to 20 mg for better control. Discussed rationale, goal of LDL <70. She is still taking simvastatin 10 mg and she did not increase the dose as per our  discussion last Sept in my chart message.   - Increase simvastatin dose to 20 mg daily.  - Order lipid panel in September to reassess LDL levels.  - Educate on dietary modifications to reduce LDL, including reducing meat and saturated fat intake.      Hypertension  Hypertension currently managed with metoprolol succinate 25 mg daily. Lisinopril was discontinued by primary care due to absence of proteinuria per pt. Current blood pressure is 120/82 mmHg, goal of bp <130/80. Noted her cardiology Dr. Garner refilled lisinopril last April. Will forward note to PCP for clarification of lisinopril status. K was normal last March 4, 2025.   - Monitor blood pressure at home, especially if experiencing headaches.   - Reassess need for lisinopril if blood pressure consistently exceeds 130/80 mmHg.          - see above overview for further diabetes hx, see above header \"Supplementary Documentation\" for surveillance for diabetes complications & risks    Updated DM med list:   Diabetic Medications              empagliflozin (JARDIANCE) 10 MG Oral Tab (Taking) Take 1 tablet (10 mg total) by mouth daily.            Return in about 3 months (around 10/15/2025) for diabetes follow up.    RONI Correa  [facility] Endocrinology, Diabetes & Metabolism   07/15/25     Note to patient: The 21 Century Cures Act makes medical notes like these available to patients in the interest of transparency. However, be advised this is a medical document. It is intended as peer to peer communication. It is written in medical language and may contain abbreviations or verbiage that are unfamiliar. It may appear blunt or direct. Medical documents are intended to carry relevant information, facts as evident, and the clinical opinion of the practitioner.

## 2025-07-15 NOTE — PATIENT INSTRUCTIONS
Return Visit:  With RONI Correa: Return in about 3 months (around 10/15/2025) for diabetes follow up.       VISIT SUMMARY:  Today, we reviewed your type 2 diabetes, hypertension, hyperlipidemia, and other health concerns. We discussed changes to your medications and lifestyle recommendations to help manage your conditions effectively.    YOUR PLAN:  -TYPE 2 DIABETES MELLITUS: Type 2 diabetes is a condition where your body does not use insulin properly, leading to high blood sugar levels. Your A1c is currently 5.9%, slightly up from 5.8%.   We will switch your medication from metformin to Jardiance 10 mg, to be taken three times a week in the morning.   Continue to monitor your blood glucose levels using the Sreekanth sensor.   Be aware of potential side effects of Jardiance, such as dehydration and urinary tract infections.   Increase your water intake and maintain good hygiene to prevent infections.   Avoid taking Jardiance on days when you consume alcohol or if you experience dehydration symptoms.    -HYPERTENSION: Hypertension, or high blood pressure, is when the force of your blood against your artery walls is too high. Your blood pressure is currently well-controlled  with metoprolol succinate 25 mg daily.   Continue to monitor your blood pressure at home, especially if you experience headaches. We will reassess the need for lisinopril if your blood pressure consistently exceeds 130/80 mmHg.    -HYPERLIPIDEMIA: Hyperlipidemia is a condition where you have high levels of fats (lipids) in your blood, which can increase your risk of heart disease. Your LDL cholesterol was 78 mg/dL last year.   We will increase your simvastatin dose to 20 mg daily to better control your cholesterol levels.   A lipid panel ordered by primary care, get that done in 3 months.  Additionally, consider dietary changes to reduce LDL, such as reducing meat and saturated fat intake.      INSTRUCTIONS:  - Please follow up with a lipid panel  in 3 months.  -  Continue to monitor your blood pressure at home and  -  keep track of your blood glucose levels using the Sreekanth sensor.   - If you have any concerns or experience any side effects, please contact our office.    Contains text generated by [employer]     Updated diabetes medication instructions from today:   Diabetic Medications              empagliflozin (JARDIANCE) 10 MG Oral Tab (Taking) Take 1 tablet (10 mg total) by mouth daily.            Lab Results   Component Value Date    A1C 5.9 (A) 07/15/2025    A1C 5.8 (A) 03/18/2025    A1C 5.8 (A) 09/26/2024    A1C 6.5 (H) 03/07/2024        General follow up information:  Please let us know if you require any refills at least 1 week prior to your medication running out. If you do run out of medication, please call our office ASAP to request refills (do not wait until your follow up).   Please call our office if sugars at home are consistently greater than 250 or less than 70 for medication adjustment (do not wait until your follow up appointment).  Lab results and imaging will typically be reviewed at follow up appointments, or within 3-5 business days of ALL results being in if you do not have an appointment scheduled in the near future. Our office will contact you for any abnormal results requiring more urgent follow up or action.   The on-call pager is for urgent matters only. If you are a type 1 diabetic and run out of insulin after business hours 8AM-4PM, you may call the on-call pager for a refill to a 24 hour pharmacy.  If you have adrenal insufficiency and run out of steroids, you may call the on-call pager for a refill to a 24 hour pharmacy. All other refill requests should be requested during business hours.    Office phone number: [phone]; phones are open Monday-Friday 8:30-4:30.       HOW TO TREAT LOW BLOOD SUGAR (Hypoglycemia)  Low blood sugar= Less than 70, or if you start to have symptoms (below)  Symptoms: Shaking or trembling, fast  heart rate, extreme hunger, sweating, confusion/difficulty concentrating, dizziness.    How to treat a low blood sugar if you are able to eat/drink: The Rule of 15/15  If you are using continuous glucose monitor that says you are low, but you do not have any symptoms, verify on fingerstick that your blood sugar is actually low before treating.   Eat 15 grams of carbs (see examples below)  Check your blood sugar after 15 minutes. If it’s still below your target range, have another serving.   Repeat these steps until it’s in your target range. Once it’s in range, if you're nervous about your sugar going low again, have a protein source (ie, a spoonful of peanut butter).   If you have a CGM you want to look for how your arrow has changed. If you arrow is pointed up or sideways after 15 min, give your CGM more time OR check with a finger stick. Try not to eat more food until at least 15 min after the first BG check - otherwise you risk having a rebound high.  If you are experiencing symptoms and you are unable to check your blood glucose for any reason, treat the hypoglycemia.  If someone has a low blood sugar and is unconscious: Don’t hesitate to call 911. If someone is unconscious and glucagon is not available or someone does not know how to use it, call 911 immediately.     To treat a low, I recommend you carry with you easy, pre-portioned treatment for low blood sugars that are 15G of carbs:   - Children sized squeeze pouch applesauce (high fiber + carbs help prevent too high of a spike)  - Small children's sized juicebox- 15g carb --> 4oz juice box  - Glucose tablets from [employer], you can find them near diabetes supplies --> Note, you will need to eat 3-4 tablets to get to 15g of carbs  - Children sized fruit snack pack- look for one with 15 grams of total carbohydrate  - Choice of how to treat your low is important. Complex carbs, or foods that contain fats along with carbs (like chocolate) can slow the  absorption of glucose and should NOT be used to treat an emergency low

## 2025-07-16 ENCOUNTER — TELEPHONE (OUTPATIENT)
Facility: CLINIC | Age: 55
End: 2025-07-16

## 2025-07-16 PROBLEM — I15.2 HYPERTENSION ASSOCIATED WITH TYPE 2 DIABETES MELLITUS (HCC): Status: ACTIVE | Noted: 2025-07-16

## 2025-07-16 PROBLEM — E11.59 HYPERTENSION ASSOCIATED WITH TYPE 2 DIABETES MELLITUS (HCC): Status: ACTIVE | Noted: 2025-07-16

## 2025-07-16 NOTE — TELEPHONE ENCOUNTER
Received fax from pharmacy requesting a PA for Jardiance 10MG tablets.    PA initiated through Cover My Meds  Key: [id number]  PA Case ID #: [id number]  Rx #: [id number]    PA submitted through Cover My Meds, will await determination from Cover My Meds.

## 2025-07-17 ENCOUNTER — TELEPHONE (OUTPATIENT)
Facility: CLINIC | Age: 55
End: 2025-07-17

## 2025-07-17 NOTE — TELEPHONE ENCOUNTER
Pls tell patient that per her PCP, Dr Turner, he  is okay to continue lisinopril, to continue taking it. It seems like her cardiology Dr. Garner already refilled it, if she does not have refill to reach out to Dr. Garner as she is the one refilling it, patient is Estonian speaking.  Thanks.

## 2025-07-17 NOTE — TELEPHONE ENCOUNTER
Per Cover My Meds:  Outcome  Approved on July 16 by Jordan TURCIOS 2017  CaseId:[id number]   Start Date:07/16/2025-Coverage End Date:12/31/2099;  Effective Date: 7/16/2025    Closing TE

## 2025-07-18 NOTE — TELEPHONE ENCOUNTER
RN phoned patient with language line Marcus ID #[id number] to no response, left voicemail to call back.

## 2025-07-28 ENCOUNTER — LAB ENCOUNTER (OUTPATIENT)
Dept: LAB | Age: 55
End: 2025-07-28
Attending: FAMILY MEDICINE
Payer: COMMERCIAL

## 2025-07-28 ENCOUNTER — OFFICE VISIT (OUTPATIENT)
Dept: FAMILY MEDICINE CLINIC | Facility: CLINIC | Age: 55
End: 2025-07-28
Payer: COMMERCIAL

## 2025-07-28 VITALS
HEART RATE: 75 BPM | BODY MASS INDEX: 21.53 KG/M2 | OXYGEN SATURATION: 98 % | HEIGHT: 66 IN | WEIGHT: 134 LBS | DIASTOLIC BLOOD PRESSURE: 70 MMHG | SYSTOLIC BLOOD PRESSURE: 110 MMHG

## 2025-07-28 DIAGNOSIS — K21.9 GASTROESOPHAGEAL REFLUX DISEASE WITHOUT ESOPHAGITIS: Primary | ICD-10-CM

## 2025-07-28 DIAGNOSIS — E11.59 HYPERTENSION ASSOCIATED WITH TYPE 2 DIABETES MELLITUS (HCC): ICD-10-CM

## 2025-07-28 DIAGNOSIS — E11.9 TYPE 2 DIABETES MELLITUS TREATED WITHOUT INSULIN (HCC): ICD-10-CM

## 2025-07-28 DIAGNOSIS — I15.2 HYPERTENSION ASSOCIATED WITH TYPE 2 DIABETES MELLITUS (HCC): ICD-10-CM

## 2025-07-28 DIAGNOSIS — R53.83 OTHER FATIGUE: ICD-10-CM

## 2025-07-28 LAB
ALBUMIN SERPL-MCNC: 4.3 G/DL (ref 3.2–4.8)
ALBUMIN/GLOB SERPL: 1.6 (ref 1–2)
ALP LIVER SERPL-CCNC: 78 U/L (ref 46–118)
ALT SERPL-CCNC: 24 U/L (ref 10–49)
ANION GAP SERPL CALC-SCNC: 11 MMOL/L (ref 0–18)
AST SERPL-CCNC: 25 U/L (ref ?–34)
BASOPHILS # BLD AUTO: 0.04 X10(3) UL (ref 0–0.2)
BASOPHILS NFR BLD AUTO: 0.6 %
BILIRUB SERPL-MCNC: 0.5 MG/DL (ref 0.3–1.2)
BUN BLD-MCNC: 15 MG/DL (ref 9–23)
CALCIUM BLD-MCNC: 9 MG/DL (ref 8.7–10.6)
CHLORIDE SERPL-SCNC: 107 MMOL/L (ref 98–112)
CO2 SERPL-SCNC: 23 MMOL/L (ref 21–32)
CREAT BLD-MCNC: 0.84 MG/DL (ref 0.55–1.02)
EGFRCR SERPLBLD CKD-EPI 2021: 82 ML/MIN/1.73M2 (ref 60–?)
EOSINOPHIL # BLD AUTO: 0.09 X10(3) UL (ref 0–0.7)
EOSINOPHIL NFR BLD AUTO: 1.3 %
ERYTHROCYTE [DISTWIDTH] IN BLOOD BY AUTOMATED COUNT: 12.1 %
FASTING STATUS PATIENT QL REPORTED: NO
GLOBULIN PLAS-MCNC: 2.7 G/DL (ref 2–3.5)
GLUCOSE BLD-MCNC: 133 MG/DL (ref 70–99)
HCT VFR BLD AUTO: 40.2 % (ref 35–48)
HGB BLD-MCNC: 12.9 G/DL (ref 12–16)
IMM GRANULOCYTES # BLD AUTO: 0.01 X10(3) UL (ref 0–1)
IMM GRANULOCYTES NFR BLD: 0.1 %
LYMPHOCYTES # BLD AUTO: 1.53 X10(3) UL (ref 1–4)
LYMPHOCYTES NFR BLD AUTO: 22.7 %
MCH RBC QN AUTO: 30.2 PG (ref 26–34)
MCHC RBC AUTO-ENTMCNC: 32.1 G/DL (ref 31–37)
MCV RBC AUTO: 94.1 FL (ref 80–100)
MONOCYTES # BLD AUTO: 0.42 X10(3) UL (ref 0.1–1)
MONOCYTES NFR BLD AUTO: 6.2 %
NEUTROPHILS # BLD AUTO: 4.66 X10 (3) UL (ref 1.5–7.7)
NEUTROPHILS # BLD AUTO: 4.66 X10(3) UL (ref 1.5–7.7)
NEUTROPHILS NFR BLD AUTO: 69.1 %
OSMOLALITY SERPL CALC.SUM OF ELEC: 295 MOSM/KG (ref 275–295)
PLATELET # BLD AUTO: 266 10(3)UL (ref 150–450)
POTASSIUM SERPL-SCNC: 3.8 MMOL/L (ref 3.5–5.1)
PROT SERPL-MCNC: 7 G/DL (ref 5.7–8.2)
RBC # BLD AUTO: 4.27 X10(6)UL (ref 3.8–5.3)
SODIUM SERPL-SCNC: 141 MMOL/L (ref 136–145)
T4 FREE SERPL-MCNC: 1.4 NG/DL (ref 0.8–1.7)
TSI SER-ACNC: 0.56 UIU/ML (ref 0.55–4.78)
WBC # BLD AUTO: 6.8 X10(3) UL (ref 4–11)

## 2025-07-28 PROCEDURE — 80053 COMPREHEN METABOLIC PANEL: CPT | Performed by: FAMILY MEDICINE

## 2025-07-28 PROCEDURE — 85652 RBC SED RATE AUTOMATED: CPT | Performed by: FAMILY MEDICINE

## 2025-07-28 PROCEDURE — 85025 COMPLETE CBC W/AUTO DIFF WBC: CPT | Performed by: FAMILY MEDICINE

## 2025-07-28 PROCEDURE — 36415 COLL VENOUS BLD VENIPUNCTURE: CPT | Performed by: FAMILY MEDICINE

## 2025-07-28 PROCEDURE — 99214 OFFICE O/P EST MOD 30 MIN: CPT | Performed by: FAMILY MEDICINE

## 2025-07-28 PROCEDURE — 83036 HEMOGLOBIN GLYCOSYLATED A1C: CPT | Performed by: FAMILY MEDICINE

## 2025-07-28 PROCEDURE — 84439 ASSAY OF FREE THYROXINE: CPT | Performed by: FAMILY MEDICINE

## 2025-07-28 PROCEDURE — 84443 ASSAY THYROID STIM HORMONE: CPT | Performed by: FAMILY MEDICINE

## 2025-07-29 LAB
ERYTHROCYTE [SEDIMENTATION RATE] IN BLOOD: 12 MM/HR (ref 0–30)
EST. AVERAGE GLUCOSE BLD GHB EST-MCNC: 128 MG/DL (ref 68–126)
HBA1C MFR BLD: 6.1 % (ref ?–5.7)

## 2025-07-31 ENCOUNTER — TELEPHONE (OUTPATIENT)
Facility: CLINIC | Age: 55
End: 2025-07-31

## 2025-08-03 ENCOUNTER — HOSPITAL ENCOUNTER (OUTPATIENT)
Dept: MRI IMAGING | Age: 55
Discharge: HOME | End: 2025-08-03
Attending: Other

## 2025-08-03 ENCOUNTER — HOSPITAL ENCOUNTER (OUTPATIENT)
Dept: MRI IMAGING | Age: 55
End: 2025-08-03
Attending: Other

## 2025-08-03 DIAGNOSIS — R42 DIZZINESS: ICD-10-CM

## 2025-08-03 DIAGNOSIS — R51.9 NONINTRACTABLE HEADACHE, UNSPECIFIED CHRONICITY PATTERN, UNSPECIFIED HEADACHE TYPE: ICD-10-CM

## 2025-08-03 PROCEDURE — 70553 MRI BRAIN STEM W/O & W/DYE: CPT | Performed by: OTHER

## 2025-08-03 PROCEDURE — A9575 INJ GADOTERATE MEGLUMI 0.1ML: HCPCS | Performed by: OTHER

## 2025-08-03 RX ORDER — GADOTERATE MEGLUMINE 376.9 MG/ML
15 INJECTION INTRAVENOUS
Status: COMPLETED | OUTPATIENT
Start: 2025-08-03 | End: 2025-08-03

## 2025-08-03 RX ADMIN — GADOTERATE MEGLUMINE 14 ML: 376.9 INJECTION INTRAVENOUS at 17:39:00

## 2025-08-04 ENCOUNTER — MED REC SCAN ONLY (OUTPATIENT)
Facility: CLINIC | Age: 55
End: 2025-08-04

## 2025-08-19 ENCOUNTER — HOSPITAL ENCOUNTER (OUTPATIENT)
Dept: MAMMOGRAPHY | Age: 55
Discharge: HOME | End: 2025-08-19
Attending: FAMILY MEDICINE

## 2025-08-19 DIAGNOSIS — N60.02 CYST OF LEFT BREAST: ICD-10-CM

## 2025-08-19 PROCEDURE — 77062 BREAST TOMOSYNTHESIS BI: CPT | Performed by: FAMILY MEDICINE

## 2025-08-19 PROCEDURE — 77066 DX MAMMO INCL CAD BI: CPT | Performed by: FAMILY MEDICINE

## 2025-08-21 ENCOUNTER — TELEPHONE (OUTPATIENT)
Facility: CLINIC | Age: 55
End: 2025-08-21

## 2025-08-21 DIAGNOSIS — E11.9 TYPE 2 DIABETES MELLITUS TREATED WITHOUT INSULIN (HCC): Primary | ICD-10-CM

## 2025-08-21 RX ORDER — LANCETS
EACH MISCELLANEOUS
Qty: 100 EACH | Refills: 1 | Status: SHIPPED | OUTPATIENT
Start: 2025-08-21

## (undated) DEVICE — RETRACTOR LONE STAR STAYS LG

## (undated) DEVICE — TUBING CYSTO

## (undated) DEVICE — TROCAR: Brand: KII® SLEEVE

## (undated) DEVICE — SUT VICRYL 0 J912G

## (undated) DEVICE — ENDOCUT SCISSOR TIP, DISPOSABLE: Brand: RENEW

## (undated) DEVICE — SLEEVE COMPR MD KNEE LEN SGL USE KENDALL SCD

## (undated) DEVICE — STERILE POLYISOPRENE POWDER-FREE SURGICAL GLOVES: Brand: PROTEXIS

## (undated) DEVICE — ADHESIVE SKIN TOP FOR WND CLSR DERMBND ADV

## (undated) DEVICE — #10 STERILE BLADE: Brand: POLYMER COATED BLADES

## (undated) DEVICE — SPATULA CAUTERY PROBE TIP, DISPOSABLE: Brand: RENEW

## (undated) DEVICE — CLOSURE EXOFIN 1.0ML

## (undated) DEVICE — SUT MCRYL 4-0 18IN PS-2 ABSRB UD 19MM 3/8 CIR

## (undated) DEVICE — PENCIL TELESCOPE MEGADYNE SE

## (undated) DEVICE — TRAY CATH 16FR F INCL BARDX IC COMPLT CARE

## (undated) DEVICE — LAPAROTOMY SPONGE - RF AND X-RAY DETECTABLE PRE-WASHED: Brand: SITUATE

## (undated) DEVICE — SOLUTION IRRIG 1000ML 0.9% NACL USP BTL

## (undated) DEVICE — #11 STERILE BLADE: Brand: POLYMER COATED BLADES

## (undated) DEVICE — SUT VICRYL 2-0 CT-1 J339H

## (undated) DEVICE — Device: Brand: SUTURE PASSOR PRO

## (undated) DEVICE — #15 STERILE STAINLESS BLADE: Brand: STERILE STAINLESS BLADES

## (undated) DEVICE — TRADITIONAL MARYLAND DISSECTOR TIP, DISPOSABLE: Brand: RENEW

## (undated) DEVICE — CLIP APPLIER WITH CLIP LOGIC TECHNOLOGY: Brand: ENDO CLIP III

## (undated) DEVICE — TROCAR: Brand: KII FIOS FIRST ENTRY

## (undated) DEVICE — GYN CDS: Brand: MEDLINE INDUSTRIES, INC.

## (undated) DEVICE — MEDI-VAC NON-CONDUCTIVE SUCTION TUBING: Brand: CARDINAL HEALTH

## (undated) DEVICE — POUCH SPECIMEN WIRE 6X3 250ML

## (undated) DEVICE — 40580 - THE PINK PAD - ADVANCED TRENDELENBURG POSITIONING KIT: Brand: 40580 - THE PINK PAD - ADVANCED TRENDELENBURG POSITIONING KIT

## (undated) DEVICE — SUT VICRYL 0 CT-1 JJ31G

## (undated) DEVICE — L-HOOK CAUTERY PROBE TIP, DISPOSABLE: Brand: RENEW

## (undated) DEVICE — MONOPTY® DISPOSABLE CORE BIOPSY INSTRUMENT, 22MM PENETRATION DEPTH, 18G X 16CM: Brand: MONOPTY

## (undated) DEVICE — STANDARD HYPODERMIC NEEDLE,POLYPROPYLENE HUB: Brand: MONOJECT

## (undated) DEVICE — GLOVE SUR 8 SENSICARE PI PIP CRM PWD F

## (undated) DEVICE — ENDOPATH ULTRA VERESS INSUFFLATION NEEDLES WITH LUER LOCK CONNECTORS: Brand: ENDOPATH

## (undated) DEVICE — COBRA TOOTH GRASPER TIP, DISPOSABLE: Brand: RENEW

## (undated) DEVICE — SOL H2O 3000ML IRRIG

## (undated) DEVICE — GRABBER GRASPER TIP, DISPOSABLE: Brand: RENEW

## (undated) DEVICE — SLEEVE KENDALL SCD EXPRESS MED

## (undated) DEVICE — LAP CHOLE/APPY CDS-LF: Brand: MEDLINE INDUSTRIES, INC.

## (undated) DEVICE — LAPAROVUE VISIBILITY SYSTEM LAPAROSCOPIC SOLUTIONS: Brand: LAPAROVUE

## (undated) DEVICE — PAD,NON-ADHERENT,3X8,STERILE,LF,1/PK: Brand: MEDLINE

## (undated) DEVICE — COVER LT HNDL RIG FOR SUR CAM DISP

## (undated) DEVICE — SUT PDS II 0 L27IN ABSRB VLT L26MM CT-2

## (undated) DEVICE — SOLUTION  .9 1000ML BTL

## (undated) DEVICE — TROCAR: Brand: KII SHIELDED BLADED ACCESS SYSTEM

## (undated) NOTE — Clinical Note
Thank you so much for the referral. Patient seen for diabetes education today, please refer to note for specifics. Follow up indicated in 3 months.

## (undated) NOTE — ED AVS SNAPSHOT
Deanna Jessica   MRN: [id number]    Department:  [facility] Emergency Department in [place]   Date of Visit:  6/18/2019           Disclosure     Insurance plans vary and the physician(s) referred by the ER may not be covered by your plan.  Please contact tell this physician (or your personal doctor if your instructions are to return to your personal doctor) about any new or lasting problems. The primary care or specialist physician will see patients referred from the [facility] Emergency Department.  Dinesh Owen

## (undated) NOTE — Clinical Note
Salome Dacosta, :10/7/1970    CONSENT FOR PROCEDURE/SEDATION    1. I authorize the performance upon Salome Dacosta  the following: Colposcopy with biopsy and Endocervical curettage    2.  I authorize Dr. Arnel Temple MD (and whomever is designated as th Relationship to patient: ____________________________________________    Witness: _________________________________________ Date:___________     Physician Signature: _______________________________ Date:___________

## (undated) NOTE — ED AVS SNAPSHOT
Chong Negron   MRN: [id number]    Department:  [facility] Emergency Department in [place]   Date of Visit:  12/17/2018           Disclosure     Insurance plans vary and the physician(s) referred by the ER may not be covered by your plan.  Please contact tell this physician (or your personal doctor if your instructions are to return to your personal doctor) about any new or lasting problems. The primary care or specialist physician will see patients referred from the [facility] Emergency Department.  Cathy Moreira

## (undated) NOTE — MR AVS SNAPSHOT
[facility] [address], [place] [address] [address]  [phone]               Thank you for choosing us for your health care visit with Len Ma MD.  We are glad to serve you and happy to provide you with this This list is accurate as of: 3/20/17 11:08 AM.  Always use your most recent med list.                Meloxicam 15 MG Tabs                   Results of Recent Testing       MyChart     Sign up for [employer], your secure online medical record.   Just around [address] will al HOW TO GET STARTED: HOW TO STAY MOTIVATED:   Start activities slowly and build up over time Do what you like   Get your heart pumping – brisk walking, biking, swimming Even 10 minute increments are effective and add up over the week   2 ½ hours per week –

## (undated) NOTE — MR AVS SNAPSHOT
[facility] [address], [facility] [id number] [facility]  [phone]               Thank you for choosing us for your health care visit with Bertha Hand MD.  We are glad to serve you and happy to provide you with this information, go to [email or web address] and click on the Sign Up Now link in the [employer] box. Enter your [employer] [place] Activation Code exactly as it appears below along with your Zip Code and Date of Birth to complete the sign-up process.  If you do You don’t need to join a gym. Home exercises work great.  Put more priority on exercise in your life                    Visit [facility] online at  [email or web address]

## (undated) NOTE — Clinical Note
John Latham, I saw our mutual patient for DM. Per patient you discontinued her lisinopril but I saw her cardiology refilled it last April, just want to make sure if this is correct? per patient's statement as I will defer to you of removing lisinopril in her medication list. Prateek Goddard NP

## (undated) NOTE — Clinical Note
Scheduled to see you in 2 days, very nice lady who stopped her metformin because of weight loss? She has been using Sreekanth 3, trends in note. Thank you!

## (undated) NOTE — MR AVS SNAPSHOT
After Visit Summary   3/20/2017    Bob Alston    MRN: [id number]           Visit Information        Provider Department Dept Phone    3/20/2017 10:00 AM Randy Virgen MD [facility] [phone]      Your Vitals Were     BP Pulse Resp Ht Wt B to securely access your online medical record. [employer] allows you to send messages to your doctor, view test results, renew prescriptions and request appointments. How Do I Sign Up? In your Internet browser, go to [email or web address]. complete it and provide feedback. We strive to deliver the best patient experience and are looking for ways to make improvements. Your feedback will help us do so. For more information on [employer], please visit [email or web address]

## (undated) NOTE — Clinical Note
Please get note from lens niurkaftkemal from Isaiah the patient's visit note last Sept, 2024 to check for any retina exam, ty

## (undated) NOTE — LETTER
OUTSIDE TESTING RESULT REQUEST     IMPORTANT: FOR YOUR IMMEDIATE ATTENTION  Please FAX all test results listed below to: [phone]     Testing already done on or about: 2022     * * * * If testing is NOT complete, arrange with patient A.S.A.P. * * * *      Patient Name: Benny Fitzpatrick  Surgery Date: 2022  CSN: [id number]  Medical Record: [id number]   : 10/7/1970 - A: 46 y      Sex: female  Surgeon(s):  Gladies Landau, MD  Procedure: TOTAL VAGINAL HYSTERECTOMY, BILATERAL SALPINGECTOMY, UTEROSACRAL LIGAMENT SUSPENSION, ANTERIOR, POSTERIOR, ENTEROCELE REPAIR, PLACEMENT OF MID-URETHRAL SLING, CYSTOSCOPY  Anesthesia Type: General     Surgeon: Gladies Landau, MD     The following Testing and Time Line are REQUIRED PER ANESTHESIA     EKG READ AND SIGNED WITHIN   90 days      Thank You,   Sent by: Jesica SINGH

## (undated) NOTE — ED AVS SNAPSHOT
Sarmad Madrid   MRN: [id number]    Department:  [facility] in [place]   Date of Visit:  8/3/2018           Disclosure     Insurance plans vary and the physician(s) referred by the ER may not be covered by your plan.  Please contact y tell this physician (or your personal doctor if your instructions are to return to your personal doctor) about any new or lasting problems. The primary care or specialist physician will see patients referred from the [facility] Emergency Department.  Salvadore Skiff

## (undated) NOTE — Clinical Note
Thank you so much for the referral. Patient seen for diabetes education today, please refer to note for specifics.

## (undated) NOTE — Clinical Note
Pls obtain eye exam from [facility] in [place], [place], per patient she was there last Sept of 2024,thanks

## (undated) NOTE — Clinical Note
[facility]  After Care Instructions for Colposcopy/Biopsy      Biopsy Results   You will receive a phone call with your biopsy results in 7 business days. If you have not received your biopsy results in 7 days, please contact our office.   Jerman An

## (undated) NOTE — LETTER
24    Patient: Jennifer Freeman  : 10/7/1970 Visit date: 2024    Dear  RONI Lopez    Thank you for referring Jennifer Freeman to my practice.  Please find my assessment and plan below.     Assessment   1. Gallbladder polyp          Plan     The patient will be scheduled for laparoscopic cholecystectomy with ICG cholangiogram.  Patient's gallbladder polyp meets size criteria for removal.    With respect to her left upper quadrant pain I reinforced to the patient she should complete the CT scan which has been ordered and also follow-up with her GI team to determine further diagnostic workup and intervention as needed.    The jeannine-operative care plan was discussed with the patient, who voices understanding.  Activity and lifting recommendations were discussed in length.     The risks, benefits, and alternatives to the procedure were explained to the patient.  The risks explained include, but are not limited to, bleeding, infection, pain wound complications, recurrence, incorrect diagnosis, injury to adjacent organs and structures. We also discussed the possibile need for further therapeutic, diagnostic, or surgical intervention.  The patient voiced understanding, and after all questions were answered to the patient's satisfaction, the patient provided willing and informed consent to proceed.      Sincerely,       Brant Soria MD   CC:   No Recipients